# Patient Record
Sex: FEMALE | Race: BLACK OR AFRICAN AMERICAN | Employment: PART TIME | URBAN - METROPOLITAN AREA
[De-identification: names, ages, dates, MRNs, and addresses within clinical notes are randomized per-mention and may not be internally consistent; named-entity substitution may affect disease eponyms.]

---

## 2018-05-09 VITALS
OXYGEN SATURATION: 97 % | HEIGHT: 65 IN | RESPIRATION RATE: 17 BRPM | HEART RATE: 91 BPM | WEIGHT: 155 LBS | SYSTOLIC BLOOD PRESSURE: 105 MMHG | TEMPERATURE: 97.9 F | DIASTOLIC BLOOD PRESSURE: 69 MMHG | BODY MASS INDEX: 25.83 KG/M2

## 2018-05-09 LAB
BACTERIA: ABNORMAL /HPF
BILIRUBIN URINE: NEGATIVE
BLOOD, URINE: NEGATIVE
CHP ED QC CHECK: YES
CLARITY: CLEAR
COLOR: YELLOW
EPITHELIAL CELLS, UA: ABNORMAL /HPF
GLUCOSE URINE: NEGATIVE MG/DL
KETONES, URINE: NEGATIVE MG/DL
LEUKOCYTE ESTERASE, URINE: NEGATIVE
NITRITE, URINE: POSITIVE
PH UA: 6.5 (ref 5–9)
PREGNANCY TEST URINE, POC: POSITIVE
PROTEIN UA: NEGATIVE MG/DL
RBC UA: ABNORMAL /HPF (ref 0–2)
SPECIFIC GRAVITY UA: 1.02 (ref 1–1.03)
UROBILINOGEN, URINE: 1 E.U./DL
WBC UA: ABNORMAL /HPF (ref 0–5)

## 2018-05-09 PROCEDURE — 81001 URINALYSIS AUTO W/SCOPE: CPT

## 2018-05-09 ASSESSMENT — PAIN DESCRIPTION - LOCATION: LOCATION: ABDOMEN

## 2018-05-09 ASSESSMENT — PAIN DESCRIPTION - DESCRIPTORS: DESCRIPTORS: STABBING

## 2018-05-09 ASSESSMENT — PAIN DESCRIPTION - PAIN TYPE: TYPE: ACUTE PAIN

## 2018-05-09 ASSESSMENT — PAIN SCALES - GENERAL: PAINLEVEL_OUTOF10: 8

## 2018-05-10 ENCOUNTER — APPOINTMENT (OUTPATIENT)
Dept: ULTRASOUND IMAGING | Age: 23
End: 2018-05-10
Payer: OTHER GOVERNMENT

## 2018-05-10 ENCOUNTER — HOSPITAL ENCOUNTER (EMERGENCY)
Age: 23
Discharge: HOME | End: 2018-05-11
Payer: OTHER GOVERNMENT

## 2018-05-10 ENCOUNTER — HOSPITAL ENCOUNTER (EMERGENCY)
Age: 23
Discharge: LEFT BEFORE DISCHARGE | End: 2018-05-10
Payer: OTHER GOVERNMENT

## 2018-05-10 VITALS
OXYGEN SATURATION: 100 % | HEART RATE: 109 BPM | RESPIRATION RATE: 14 BRPM | WEIGHT: 150 LBS | TEMPERATURE: 98.3 F | HEIGHT: 65 IN | SYSTOLIC BLOOD PRESSURE: 113 MMHG | DIASTOLIC BLOOD PRESSURE: 57 MMHG | BODY MASS INDEX: 24.99 KG/M2

## 2018-05-10 DIAGNOSIS — N30.00 ACUTE CYSTITIS WITHOUT HEMATURIA: Primary | ICD-10-CM

## 2018-05-10 DIAGNOSIS — Z33.1 PREGNANCY AS INCIDENTAL FINDING: ICD-10-CM

## 2018-05-10 LAB
ALBUMIN SERPL-MCNC: 4.4 G/DL (ref 3.5–5.2)
ALP BLD-CCNC: 104 U/L (ref 35–104)
ALT SERPL-CCNC: 16 U/L (ref 0–32)
ANION GAP SERPL CALCULATED.3IONS-SCNC: 11 MMOL/L (ref 7–16)
AST SERPL-CCNC: 16 U/L (ref 0–31)
BACTERIA: ABNORMAL /HPF
BASOPHILS ABSOLUTE: 0.05 E9/L (ref 0–0.2)
BASOPHILS RELATIVE PERCENT: 0.6 % (ref 0–2)
BILIRUB SERPL-MCNC: <0.2 MG/DL (ref 0–1.2)
BILIRUBIN DIRECT: <0.2 MG/DL (ref 0–0.3)
BILIRUBIN URINE: NEGATIVE
BILIRUBIN, INDIRECT: NORMAL MG/DL (ref 0–1)
BLOOD, URINE: NEGATIVE
BUN BLDV-MCNC: 16 MG/DL (ref 6–20)
CALCIUM SERPL-MCNC: 9.5 MG/DL (ref 8.6–10.2)
CHLORIDE BLD-SCNC: 102 MMOL/L (ref 98–107)
CHP ED QC CHECK: YES
CLARITY: ABNORMAL
CO2: 25 MMOL/L (ref 22–29)
COLOR: YELLOW
CREAT SERPL-MCNC: 0.6 MG/DL (ref 0.5–1)
EOSINOPHILS ABSOLUTE: 0.11 E9/L (ref 0.05–0.5)
EOSINOPHILS RELATIVE PERCENT: 1.3 % (ref 0–6)
EPITHELIAL CELLS, UA: ABNORMAL /HPF
GFR AFRICAN AMERICAN: >60
GFR NON-AFRICAN AMERICAN: >60 ML/MIN/1.73
GLUCOSE BLD-MCNC: 87 MG/DL (ref 74–109)
GLUCOSE URINE: NEGATIVE MG/DL
GONADOTROPIN, CHORIONIC (HCG) QUANT: ABNORMAL MIU/ML
HCT VFR BLD CALC: 33.7 % (ref 34–48)
HEMOGLOBIN: 11 G/DL (ref 11.5–15.5)
IMMATURE GRANULOCYTES #: 0.02 E9/L
IMMATURE GRANULOCYTES %: 0.2 % (ref 0–5)
KETONES, URINE: 15 MG/DL
LACTIC ACID: 0.8 MMOL/L (ref 0.5–2.2)
LEUKOCYTE ESTERASE, URINE: NEGATIVE
LYMPHOCYTES ABSOLUTE: 3.18 E9/L (ref 1.5–4)
LYMPHOCYTES RELATIVE PERCENT: 39 % (ref 20–42)
MCH RBC QN AUTO: 29.8 PG (ref 26–35)
MCHC RBC AUTO-ENTMCNC: 32.6 % (ref 32–34.5)
MCV RBC AUTO: 91.3 FL (ref 80–99.9)
MONOCYTES ABSOLUTE: 0.68 E9/L (ref 0.1–0.95)
MONOCYTES RELATIVE PERCENT: 8.3 % (ref 2–12)
NEUTROPHILS ABSOLUTE: 4.11 E9/L (ref 1.8–7.3)
NEUTROPHILS RELATIVE PERCENT: 50.6 % (ref 43–80)
NITRITE, URINE: POSITIVE
PDW BLD-RTO: 13.4 FL (ref 11.5–15)
PH UA: 6 (ref 5–9)
PLATELET # BLD: 235 E9/L (ref 130–450)
PMV BLD AUTO: 11.2 FL (ref 7–12)
POTASSIUM SERPL-SCNC: 3.7 MMOL/L (ref 3.5–5)
PREGNANCY TEST URINE, POC: POSITIVE
PROTEIN UA: NEGATIVE MG/DL
RBC # BLD: 3.69 E12/L (ref 3.5–5.5)
RBC UA: ABNORMAL /HPF (ref 0–2)
SODIUM BLD-SCNC: 138 MMOL/L (ref 132–146)
SPECIFIC GRAVITY UA: >=1.03 (ref 1–1.03)
TOTAL PROTEIN: 7.5 G/DL (ref 6.4–8.3)
UROBILINOGEN, URINE: 0.2 E.U./DL
WBC # BLD: 8.2 E9/L (ref 4.5–11.5)
WBC UA: ABNORMAL /HPF (ref 0–5)

## 2018-05-10 PROCEDURE — 85025 COMPLETE CBC W/AUTO DIFF WBC: CPT

## 2018-05-10 PROCEDURE — 99284 EMERGENCY DEPT VISIT MOD MDM: CPT

## 2018-05-10 PROCEDURE — 76801 OB US < 14 WKS SINGLE FETUS: CPT

## 2018-05-10 PROCEDURE — 4500000002 HC ER NO CHARGE

## 2018-05-10 PROCEDURE — 2580000003 HC RX 258: Performed by: PHYSICIAN ASSISTANT

## 2018-05-10 PROCEDURE — 80076 HEPATIC FUNCTION PANEL: CPT

## 2018-05-10 PROCEDURE — 80048 BASIC METABOLIC PNL TOTAL CA: CPT

## 2018-05-10 PROCEDURE — 81001 URINALYSIS AUTO W/SCOPE: CPT

## 2018-05-10 PROCEDURE — 83605 ASSAY OF LACTIC ACID: CPT

## 2018-05-10 PROCEDURE — 36415 COLL VENOUS BLD VENIPUNCTURE: CPT

## 2018-05-10 PROCEDURE — 84702 CHORIONIC GONADOTROPIN TEST: CPT

## 2018-05-10 RX ORDER — 0.9 % SODIUM CHLORIDE 0.9 %
1000 INTRAVENOUS SOLUTION INTRAVENOUS ONCE
Status: COMPLETED | OUTPATIENT
Start: 2018-05-10 | End: 2018-05-10

## 2018-05-10 RX ADMIN — SODIUM CHLORIDE 1000 ML: 9 INJECTION, SOLUTION INTRAVENOUS at 21:15

## 2018-05-10 ASSESSMENT — PAIN DESCRIPTION - DESCRIPTORS: DESCRIPTORS: CRAMPING

## 2018-05-10 ASSESSMENT — PAIN SCALES - GENERAL: PAINLEVEL_OUTOF10: 8

## 2018-05-10 ASSESSMENT — PAIN DESCRIPTION - ORIENTATION: ORIENTATION: LOWER;RIGHT

## 2018-05-10 ASSESSMENT — PAIN DESCRIPTION - LOCATION: LOCATION: ABDOMEN

## 2018-05-10 ASSESSMENT — PAIN DESCRIPTION - PAIN TYPE: TYPE: ACUTE PAIN

## 2018-05-11 LAB
CLUE CELLS: NORMAL
SOURCE WET PREP: NORMAL
TRICHOMONAS PREP: NORMAL
YEAST WET PREP: NORMAL

## 2018-05-11 PROCEDURE — 6370000000 HC RX 637 (ALT 250 FOR IP): Performed by: PHYSICIAN ASSISTANT

## 2018-05-11 PROCEDURE — 87491 CHLMYD TRACH DNA AMP PROBE: CPT

## 2018-05-11 PROCEDURE — 87591 N.GONORRHOEAE DNA AMP PROB: CPT

## 2018-05-11 PROCEDURE — 87210 SMEAR WET MOUNT SALINE/INK: CPT

## 2018-05-11 RX ORDER — CEPHALEXIN 500 MG/1
500 CAPSULE ORAL 2 TIMES DAILY
Qty: 10 CAPSULE | Refills: 0 | Status: SHIPPED | OUTPATIENT
Start: 2018-05-11 | End: 2018-05-16

## 2018-05-11 RX ORDER — FOLIC ACID, .BETA.-CAROTENE, ASCORBIC ACID, CHOLECALCIFEROL, .ALPHA.-TOCOPHEROL ACETATE, DL-, THIAMINE MONONITRATE, RIBOFLAVIN, NIACINAMIDE, PYRIDOXINE HYDROCHLORIDE, CYANOCOBALAMIN, CALCIUM PANTOTHENATE, CALCIUM CARBONATE, FERROUS FUMARATE, AND ZINC OXIDE 1; 1000; 100; 400; 30; 3; 3; 15; 20; 12; 7; 200; 29; 20 MG/1; [IU]/1; MG/1; [IU]/1; [IU]/1; MG/1; MG/1; MG/1; MG/1; UG/1; MG/1; MG/1; MG/1; MG/1
1 TABLET, CHEWABLE ORAL DAILY
Qty: 30 TABLET | Refills: 1 | Status: SHIPPED | OUTPATIENT
Start: 2018-05-11 | End: 2019-08-28 | Stop reason: ALTCHOICE

## 2018-05-11 RX ORDER — CEPHALEXIN 500 MG/1
500 CAPSULE ORAL ONCE
Status: COMPLETED | OUTPATIENT
Start: 2018-05-11 | End: 2018-05-11

## 2018-05-11 RX ADMIN — CEPHALEXIN 500 MG: 500 CAPSULE ORAL at 01:37

## 2018-05-14 LAB
CHLAMYDIA TRACHOMATIS AMPLIFIED DET: NORMAL
N GONORRHOEAE AMPLIFIED DET: NORMAL

## 2018-11-05 ENCOUNTER — HOSPITAL ENCOUNTER (OUTPATIENT)
Age: 23
Setting detail: OBSERVATION
Discharge: HOME | End: 2018-11-06
Attending: OBSTETRICS & GYNECOLOGY | Admitting: OBSTETRICS & GYNECOLOGY
Payer: COMMERCIAL

## 2018-11-06 VITALS
WEIGHT: 155 LBS | DIASTOLIC BLOOD PRESSURE: 53 MMHG | BODY MASS INDEX: 24.91 KG/M2 | HEART RATE: 110 BPM | SYSTOLIC BLOOD PRESSURE: 110 MMHG | RESPIRATION RATE: 18 BRPM | HEIGHT: 66 IN | TEMPERATURE: 98.7 F

## 2018-11-06 PROBLEM — Z3A.30 30 WEEKS GESTATION OF PREGNANCY: Status: ACTIVE | Noted: 2018-11-06

## 2018-11-06 LAB
ABO/RH: NORMAL
ANTIBODY SCREEN: NORMAL
KLEIHAUER BETKE: NORMAL

## 2018-11-06 PROCEDURE — 86900 BLOOD TYPING SEROLOGIC ABO: CPT

## 2018-11-06 PROCEDURE — 76805 OB US >/= 14 WKS SNGL FETUS: CPT | Performed by: OBSTETRICS & GYNECOLOGY

## 2018-11-06 PROCEDURE — G0378 HOSPITAL OBSERVATION PER HR: HCPCS

## 2018-11-06 PROCEDURE — 99242 OFF/OP CONSLTJ NEW/EST SF 20: CPT | Performed by: OBSTETRICS & GYNECOLOGY

## 2018-11-06 PROCEDURE — 6370000000 HC RX 637 (ALT 250 FOR IP)

## 2018-11-06 PROCEDURE — 36415 COLL VENOUS BLD VENIPUNCTURE: CPT

## 2018-11-06 PROCEDURE — 86850 RBC ANTIBODY SCREEN: CPT

## 2018-11-06 PROCEDURE — 85460 HEMOGLOBIN FETAL: CPT

## 2018-11-06 PROCEDURE — 76805 OB US >/= 14 WKS SNGL FETUS: CPT

## 2018-11-06 PROCEDURE — 86901 BLOOD TYPING SEROLOGIC RH(D): CPT

## 2018-11-06 PROCEDURE — 76817 TRANSVAGINAL US OBSTETRIC: CPT | Performed by: OBSTETRICS & GYNECOLOGY

## 2018-11-06 PROCEDURE — 76817 TRANSVAGINAL US OBSTETRIC: CPT

## 2018-11-06 PROCEDURE — 76819 FETAL BIOPHYS PROFIL W/O NST: CPT

## 2018-11-06 PROCEDURE — 76819 FETAL BIOPHYS PROFIL W/O NST: CPT | Performed by: OBSTETRICS & GYNECOLOGY

## 2018-11-06 PROCEDURE — 6370000000 HC RX 637 (ALT 250 FOR IP): Performed by: OBSTETRICS & GYNECOLOGY

## 2018-11-06 RX ORDER — ACETAMINOPHEN 325 MG/1
TABLET ORAL
Status: DISCONTINUED
Start: 2018-11-06 | End: 2018-11-06 | Stop reason: HOSPADM

## 2018-11-06 RX ORDER — ACETAMINOPHEN 325 MG/1
TABLET ORAL
Status: COMPLETED
Start: 2018-11-06 | End: 2018-11-06

## 2018-11-06 RX ORDER — ACETAMINOPHEN 325 MG/1
650 TABLET ORAL EVERY 4 HOURS PRN
Status: DISCONTINUED | OUTPATIENT
Start: 2018-11-06 | End: 2018-11-06 | Stop reason: HOSPADM

## 2018-11-06 RX ADMIN — ACETAMINOPHEN 650 MG: 325 TABLET ORAL at 05:14

## 2018-11-06 RX ADMIN — ACETAMINOPHEN 650 MG: 325 TABLET ORAL at 00:57

## 2018-11-06 ASSESSMENT — PAIN SCALES - GENERAL
PAINLEVEL_OUTOF10: 7
PAINLEVEL_OUTOF10: 9

## 2018-11-06 NOTE — CONSULTS
82529 CPT®   US OB 14 Plus Weeks Single or First Gestation [98116 Custom]  US Fetal Biophysical Profile WO Non Stress Testing [45586 Custom]  US OB Transvaginal [id number] Custom]    **This report has been created using voice recognition software.  It may contain minor errors     which are inherent in voice recognition technology**

## 2018-11-06 NOTE — PROGRESS NOTES
Call placed to Dr. Afshan Putnam regarding (-) KB, relief of some pain with Tylenol. New orders received.

## 2018-12-06 ENCOUNTER — ROUTINE PRENATAL (OUTPATIENT)
Dept: OBGYN CLINIC | Age: 23
End: 2018-12-06
Payer: COMMERCIAL

## 2018-12-06 VITALS
WEIGHT: 162 LBS | DIASTOLIC BLOOD PRESSURE: 73 MMHG | HEART RATE: 112 BPM | BODY MASS INDEX: 26.15 KG/M2 | SYSTOLIC BLOOD PRESSURE: 120 MMHG

## 2018-12-06 DIAGNOSIS — O35.8XX0 UMBILICAL VEIN ABNORMALITY AFFECTING PREGNANCY, SINGLE OR UNSPECIFIED FETUS: Primary | ICD-10-CM

## 2018-12-06 DIAGNOSIS — Z3A.35 35 WEEKS GESTATION OF PREGNANCY: ICD-10-CM

## 2018-12-06 LAB
GLUCOSE URINE, POC: NORMAL
PROTEIN UA: NEGATIVE

## 2018-12-06 PROCEDURE — G8484 FLU IMMUNIZE NO ADMIN: HCPCS | Performed by: OBSTETRICS & GYNECOLOGY

## 2018-12-06 PROCEDURE — 76819 FETAL BIOPHYS PROFIL W/O NST: CPT | Performed by: OBSTETRICS & GYNECOLOGY

## 2018-12-06 PROCEDURE — 99211 OFF/OP EST MAY X REQ PHY/QHP: CPT | Performed by: OBSTETRICS & GYNECOLOGY

## 2018-12-06 PROCEDURE — 81002 URINALYSIS NONAUTO W/O SCOPE: CPT | Performed by: OBSTETRICS & GYNECOLOGY

## 2018-12-06 PROCEDURE — 1036F TOBACCO NON-USER: CPT | Performed by: OBSTETRICS & GYNECOLOGY

## 2018-12-06 PROCEDURE — 99213 OFFICE O/P EST LOW 20 MIN: CPT | Performed by: OBSTETRICS & GYNECOLOGY

## 2018-12-06 PROCEDURE — G8427 DOCREV CUR MEDS BY ELIG CLIN: HCPCS | Performed by: OBSTETRICS & GYNECOLOGY

## 2018-12-06 PROCEDURE — 76816 OB US FOLLOW-UP PER FETUS: CPT | Performed by: OBSTETRICS & GYNECOLOGY

## 2018-12-06 PROCEDURE — G8419 CALC BMI OUT NRM PARAM NOF/U: HCPCS | Performed by: OBSTETRICS & GYNECOLOGY

## 2018-12-06 NOTE — PATIENT INSTRUCTIONS
rolls) in 1 hour are normal. Some doctors suggest that you count in the morning until you get to 10 movements. Then you can quit for that day and start again the next day. · Pick your baby's most active time of day to count. This may be any time from morning to evening. · If you do not feel 10 movements in an hour, your baby may be sleeping. Wait for the next hour and count again. When should you call for help? Call your doctor now or seek immediate medical care if:    · You noticed that your baby has stopped moving or is moving much less than normal.    Watch closely for changes in your health, and be sure to contact your doctor if you have any problems. Where can you learn more? Go to https://CornicepeHuggler.comeb.MindSet Rx. org and sign in to your [employer] account. Enter I385 in the InstyBook box to learn more about \"Counting Your Baby's Kicks: Care Instructions. \"     If you do not have an account, please click on the \"Sign Up Now\" link. Current as of: November 21, 2017  Content Version: 11.8  © 5948-7445 Healthwise, Incorporated. Care instructions adapted under license by [facility] ([facility]). If you have questions about a medical condition or this instruction, always ask your healthcare professional. [address] any warranty or liability for your use of this information.

## 2018-12-31 ENCOUNTER — HOSPITAL ENCOUNTER (INPATIENT)
Age: 23
LOS: 2 days | Discharge: HOME | DRG: 560 | End: 2019-01-02
Attending: OBSTETRICS & GYNECOLOGY | Admitting: OBSTETRICS & GYNECOLOGY
Payer: COMMERCIAL

## 2018-12-31 PROBLEM — Z3A.38 PREGNANCY WITH 38 COMPLETED WEEKS GESTATION: Status: ACTIVE | Noted: 2018-12-31

## 2018-12-31 LAB
ABO/RH: NORMAL
ANTIBODY SCREEN: NORMAL
HCT VFR BLD CALC: 32.2 % (ref 34–48)
HEMOGLOBIN: 10.2 G/DL (ref 11.5–15.5)
MCH RBC QN AUTO: 29 PG (ref 26–35)
MCHC RBC AUTO-ENTMCNC: 31.7 % (ref 32–34.5)
MCV RBC AUTO: 91.5 FL (ref 80–99.9)
PDW BLD-RTO: 14.5 FL (ref 11.5–15)
PLATELET # BLD: 209 E9/L (ref 130–450)
PMV BLD AUTO: 11.4 FL (ref 7–12)
RBC # BLD: 3.52 E12/L (ref 3.5–5.5)
WBC # BLD: 8.5 E9/L (ref 4.5–11.5)

## 2018-12-31 PROCEDURE — 1220000000 HC SEMI PRIVATE OB R&B

## 2018-12-31 PROCEDURE — 0KQM0ZZ REPAIR PERINEUM MUSCLE, OPEN APPROACH: ICD-10-PCS | Performed by: OBSTETRICS & GYNECOLOGY

## 2018-12-31 PROCEDURE — 86901 BLOOD TYPING SEROLOGIC RH(D): CPT

## 2018-12-31 PROCEDURE — 2580000003 HC RX 258: Performed by: OBSTETRICS & GYNECOLOGY

## 2018-12-31 PROCEDURE — 6370000000 HC RX 637 (ALT 250 FOR IP): Performed by: OBSTETRICS & GYNECOLOGY

## 2018-12-31 PROCEDURE — 85027 COMPLETE CBC AUTOMATED: CPT

## 2018-12-31 PROCEDURE — 86900 BLOOD TYPING SEROLOGIC ABO: CPT

## 2018-12-31 PROCEDURE — 36415 COLL VENOUS BLD VENIPUNCTURE: CPT

## 2018-12-31 PROCEDURE — 7200000001 HC VAGINAL DELIVERY

## 2018-12-31 PROCEDURE — 6360000002 HC RX W HCPCS

## 2018-12-31 PROCEDURE — 86850 RBC ANTIBODY SCREEN: CPT

## 2018-12-31 RX ORDER — DOCUSATE SODIUM 100 MG/1
100 CAPSULE, LIQUID FILLED ORAL 2 TIMES DAILY
Status: DISCONTINUED | OUTPATIENT
Start: 2018-12-31 | End: 2019-01-02 | Stop reason: HOSPADM

## 2018-12-31 RX ORDER — ACETAMINOPHEN 650 MG
TABLET, EXTENDED RELEASE ORAL
Status: DISPENSED
Start: 2018-12-31 | End: 2018-12-31

## 2018-12-31 RX ORDER — IBUPROFEN 800 MG/1
800 TABLET ORAL EVERY 8 HOURS
Status: DISCONTINUED | OUTPATIENT
Start: 2018-12-31 | End: 2019-01-02 | Stop reason: HOSPADM

## 2018-12-31 RX ORDER — NALOXONE HYDROCHLORIDE 0.4 MG/ML
0.4 INJECTION, SOLUTION INTRAMUSCULAR; INTRAVENOUS; SUBCUTANEOUS PRN
Status: DISCONTINUED | OUTPATIENT
Start: 2018-12-31 | End: 2018-12-31 | Stop reason: HOSPADM

## 2018-12-31 RX ORDER — ACETAMINOPHEN 650 MG
TABLET, EXTENDED RELEASE ORAL PRN
Status: DISCONTINUED | OUTPATIENT
Start: 2018-12-31 | End: 2019-01-02 | Stop reason: HOSPADM

## 2018-12-31 RX ORDER — NALBUPHINE HCL 10 MG/ML
5 AMPUL (ML) INJECTION EVERY 4 HOURS PRN
Status: DISCONTINUED | OUTPATIENT
Start: 2018-12-31 | End: 2018-12-31 | Stop reason: HOSPADM

## 2018-12-31 RX ORDER — SODIUM CHLORIDE 0.9 % (FLUSH) 0.9 %
10 SYRINGE (ML) INJECTION EVERY 12 HOURS SCHEDULED
Status: DISCONTINUED | OUTPATIENT
Start: 2018-12-31 | End: 2019-01-02 | Stop reason: HOSPADM

## 2018-12-31 RX ORDER — ACETAMINOPHEN 325 MG/1
650 TABLET ORAL EVERY 4 HOURS PRN
Status: DISCONTINUED | OUTPATIENT
Start: 2018-12-31 | End: 2019-01-02 | Stop reason: HOSPADM

## 2018-12-31 RX ORDER — ONDANSETRON 2 MG/ML
4 INJECTION INTRAMUSCULAR; INTRAVENOUS EVERY 6 HOURS PRN
Status: DISCONTINUED | OUTPATIENT
Start: 2018-12-31 | End: 2018-12-31 | Stop reason: HOSPADM

## 2018-12-31 RX ORDER — FLUCONAZOLE 100 MG/1
200 TABLET ORAL DAILY
Status: DISCONTINUED | OUTPATIENT
Start: 2018-12-31 | End: 2019-01-02 | Stop reason: HOSPADM

## 2018-12-31 RX ORDER — SODIUM CHLORIDE, SODIUM LACTATE, POTASSIUM CHLORIDE, CALCIUM CHLORIDE 600; 310; 30; 20 MG/100ML; MG/100ML; MG/100ML; MG/100ML
INJECTION, SOLUTION INTRAVENOUS CONTINUOUS
Status: DISCONTINUED | OUTPATIENT
Start: 2018-12-31 | End: 2019-01-02 | Stop reason: HOSPADM

## 2018-12-31 RX ORDER — SODIUM CHLORIDE 0.9 % (FLUSH) 0.9 %
10 SYRINGE (ML) INJECTION PRN
Status: DISCONTINUED | OUTPATIENT
Start: 2018-12-31 | End: 2019-01-02 | Stop reason: HOSPADM

## 2018-12-31 RX ORDER — LIDOCAINE HYDROCHLORIDE 10 MG/ML
INJECTION, SOLUTION INFILTRATION; PERINEURAL
Status: DISPENSED
Start: 2018-12-31 | End: 2018-12-31

## 2018-12-31 RX ORDER — LANOLIN 100 %
OINTMENT (GRAM) TOPICAL PRN
Status: DISCONTINUED | OUTPATIENT
Start: 2018-12-31 | End: 2019-01-02 | Stop reason: HOSPADM

## 2018-12-31 RX ADMIN — IBUPROFEN 800 MG: 800 TABLET, FILM COATED ORAL at 08:56

## 2018-12-31 RX ADMIN — BUTORPHANOL TARTRATE 2 MG: 2 INJECTION, SOLUTION INTRAMUSCULAR; INTRAVENOUS at 06:45

## 2018-12-31 RX ADMIN — Medication 999 MILLI-UNITS/MIN: at 06:40

## 2018-12-31 RX ADMIN — SODIUM CHLORIDE, POTASSIUM CHLORIDE, SODIUM LACTATE AND CALCIUM CHLORIDE: 600; 310; 30; 20 INJECTION, SOLUTION INTRAVENOUS at 05:25

## 2018-12-31 RX ADMIN — Medication 2 MG: at 06:45

## 2018-12-31 RX ADMIN — DOCUSATE SODIUM 100 MG: 100 CAPSULE, LIQUID FILLED ORAL at 21:30

## 2018-12-31 RX ADMIN — Medication 10 ML: at 21:40

## 2018-12-31 RX ADMIN — IBUPROFEN 800 MG: 800 TABLET, FILM COATED ORAL at 16:36

## 2018-12-31 RX ADMIN — IBUPROFEN 800 MG: 800 TABLET, FILM COATED ORAL at 23:42

## 2018-12-31 RX ADMIN — Medication: at 16:36

## 2018-12-31 RX ADMIN — BENZOCAINE AND LEVOMENTHOL: 200; 5 SPRAY TOPICAL at 16:36

## 2018-12-31 ASSESSMENT — PAIN SCALES - GENERAL
PAINLEVEL_OUTOF10: 2
PAINLEVEL_OUTOF10: 10
PAINLEVEL_OUTOF10: 9
PAINLEVEL_OUTOF10: 5

## 2018-12-31 ASSESSMENT — PAIN DESCRIPTION - RADICULAR PAIN: RADICULAR_PAIN: ABSENT

## 2019-01-01 LAB
HCT VFR BLD CALC: 27.2 % (ref 34–48)
HEMOGLOBIN: 8.7 G/DL (ref 11.5–15.5)
MCH RBC QN AUTO: 29.4 PG (ref 26–35)
MCHC RBC AUTO-ENTMCNC: 32 % (ref 32–34.5)
MCV RBC AUTO: 91.9 FL (ref 80–99.9)
PDW BLD-RTO: 14.4 FL (ref 11.5–15)
PLATELET # BLD: 195 E9/L (ref 130–450)
PMV BLD AUTO: 11.9 FL (ref 7–12)
RBC # BLD: 2.96 E12/L (ref 3.5–5.5)
WBC # BLD: 12 E9/L (ref 4.5–11.5)

## 2019-01-01 PROCEDURE — 36415 COLL VENOUS BLD VENIPUNCTURE: CPT

## 2019-01-01 PROCEDURE — 85027 COMPLETE CBC AUTOMATED: CPT

## 2019-01-01 PROCEDURE — 6370000000 HC RX 637 (ALT 250 FOR IP): Performed by: OBSTETRICS & GYNECOLOGY

## 2019-01-01 PROCEDURE — 1220000000 HC SEMI PRIVATE OB R&B

## 2019-01-01 RX ADMIN — IBUPROFEN 800 MG: 800 TABLET, FILM COATED ORAL at 08:02

## 2019-01-01 RX ADMIN — IBUPROFEN 800 MG: 800 TABLET, FILM COATED ORAL at 17:23

## 2019-01-01 RX ADMIN — ACETAMINOPHEN 650 MG: 325 TABLET ORAL at 14:00

## 2019-01-01 RX ADMIN — DOCUSATE SODIUM 100 MG: 100 CAPSULE, LIQUID FILLED ORAL at 08:02

## 2019-01-01 RX ADMIN — DOCUSATE SODIUM 100 MG: 100 CAPSULE, LIQUID FILLED ORAL at 21:49

## 2019-01-01 RX ADMIN — FLUCONAZOLE 200 MG: 100 TABLET ORAL at 08:02

## 2019-01-01 ASSESSMENT — PAIN SCALES - GENERAL
PAINLEVEL_OUTOF10: 3
PAINLEVEL_OUTOF10: 3
PAINLEVEL_OUTOF10: 1

## 2019-01-02 VITALS
BODY MASS INDEX: 27.16 KG/M2 | WEIGHT: 163 LBS | RESPIRATION RATE: 18 BRPM | HEIGHT: 65 IN | TEMPERATURE: 98.2 F | HEART RATE: 93 BPM | DIASTOLIC BLOOD PRESSURE: 55 MMHG | SYSTOLIC BLOOD PRESSURE: 108 MMHG

## 2019-01-02 PROCEDURE — 6370000000 HC RX 637 (ALT 250 FOR IP): Performed by: OBSTETRICS & GYNECOLOGY

## 2019-01-02 RX ORDER — IBUPROFEN 800 MG/1
800 TABLET ORAL EVERY 8 HOURS
Qty: 120 TABLET | Refills: 1 | Status: SHIPPED | OUTPATIENT
Start: 2019-01-02 | End: 2020-01-24

## 2019-01-02 RX ADMIN — DOCUSATE SODIUM 100 MG: 100 CAPSULE, LIQUID FILLED ORAL at 09:13

## 2019-01-02 RX ADMIN — IBUPROFEN 800 MG: 800 TABLET, FILM COATED ORAL at 09:13

## 2019-01-02 RX ADMIN — FLUCONAZOLE 200 MG: 100 TABLET ORAL at 09:14

## 2019-01-02 ASSESSMENT — PAIN DESCRIPTION - FREQUENCY
FREQUENCY: INTERMITTENT
FREQUENCY: INTERMITTENT

## 2019-01-02 ASSESSMENT — PAIN DESCRIPTION - PROGRESSION
CLINICAL_PROGRESSION: NOT CHANGED
CLINICAL_PROGRESSION: GRADUALLY IMPROVING

## 2019-01-02 ASSESSMENT — PAIN DESCRIPTION - LOCATION
LOCATION: PERINEUM
LOCATION: PERINEUM

## 2019-01-02 ASSESSMENT — PAIN SCALES - GENERAL
PAINLEVEL_OUTOF10: 5
PAINLEVEL_OUTOF10: 2

## 2019-01-02 ASSESSMENT — PAIN DESCRIPTION - ORIENTATION
ORIENTATION: LOWER
ORIENTATION: LOWER

## 2019-01-02 ASSESSMENT — PAIN DESCRIPTION - PAIN TYPE
TYPE: ACUTE PAIN
TYPE: ACUTE PAIN

## 2019-01-02 ASSESSMENT — PAIN DESCRIPTION - DESCRIPTORS
DESCRIPTORS: BURNING
DESCRIPTORS: BURNING

## 2019-08-28 ENCOUNTER — HOSPITAL ENCOUNTER (EMERGENCY)
Age: 24
Discharge: HOME | End: 2019-08-28
Payer: COMMERCIAL

## 2019-08-28 VITALS
WEIGHT: 140 LBS | BODY MASS INDEX: 22.5 KG/M2 | HEART RATE: 86 BPM | RESPIRATION RATE: 14 BRPM | TEMPERATURE: 98 F | HEIGHT: 66 IN | OXYGEN SATURATION: 99 % | DIASTOLIC BLOOD PRESSURE: 74 MMHG | SYSTOLIC BLOOD PRESSURE: 109 MMHG

## 2019-08-28 DIAGNOSIS — Z3A.01 LESS THAN 8 WEEKS GESTATION OF PREGNANCY: ICD-10-CM

## 2019-08-28 DIAGNOSIS — H66.92 LEFT OTITIS MEDIA, UNSPECIFIED OTITIS MEDIA TYPE: Primary | ICD-10-CM

## 2019-08-28 LAB
HCG, URINE, POC: POSITIVE
Lab: NORMAL
NEGATIVE QC PASS/FAIL: NORMAL
POSITIVE QC PASS/FAIL: NORMAL

## 2019-08-28 PROCEDURE — 99282 EMERGENCY DEPT VISIT SF MDM: CPT

## 2019-08-28 RX ORDER — AMOXICILLIN 500 MG/1
500 CAPSULE ORAL 2 TIMES DAILY
Qty: 20 CAPSULE | Refills: 0 | Status: SHIPPED | OUTPATIENT
Start: 2019-08-28 | End: 2019-09-07

## 2019-08-28 RX ORDER — PRENATAL NO.42/FOLIC ACID 1.4 MG
1 TABLET CHEW,IMMED AND DELAY REL,BIPHASE ORAL DAILY
Qty: 30 TABLET | Refills: 0 | Status: SHIPPED | OUTPATIENT
Start: 2019-08-28 | End: 2019-09-27

## 2019-08-29 NOTE — ED PROVIDER NOTES
Independent    HPI:  8/28/19, Time: 11:28 PM         Mar Schirmer is a 27 y.o. female presenting to the ED for left ear pain. Patient presents to the emergency department with 2-day history of worsening right ear pain. States that it is causing pain into her entire jaw and face. She denies any fevers. She denies any cough or any other upper respiratory symptoms denies any injury or trauma to her ear and there is been no unusual drainage. Denies any difficulty with hearing. Review of Systems:   Pertinent positives and negatives are stated within HPI, all other systems reviewed and are negative.          --------------------------------------------- PAST HISTORY ---------------------------------------------  Past Medical History:  has a past medical history of ADHD (attention deficit hyperactivity disorder), Allergic, Depression, Emotional disorder, and Traumatic injury during pregnancy, third trimester. Past Surgical History:  has no past surgical history on file. Social History:  reports that she quit smoking about 4 years ago. She has never used smokeless tobacco. She reports that she does not drink alcohol or use drugs. Family History: family history is not on file. The patients home medications have been reviewed. Allergies: Patient has no known allergies.     -------------------------------------------------- RESULTS -------------------------------------------------  All laboratory and radiology results have been personally reviewed by myself   LABS:  Results for orders placed or performed during the hospital encounter of 08/28/19   POC Pregnancy Urine   Result Value Ref Range    HCG, Urine, POC Positive Negative    Lot Number [id number]     Positive QC Pass/Fail Pass     Negative QC Pass/Fail Pass        RADIOLOGY:  Interpreted by Radiologist.  No orders to display       ------------------------- NURSING NOTES AND VITALS REVIEWED ---------------------------   The nursing notes within the

## 2019-12-27 ENCOUNTER — TELEPHONE (OUTPATIENT)
Dept: ADMINISTRATIVE | Age: 24
End: 2019-12-27

## 2020-01-24 ENCOUNTER — ROUTINE PRENATAL (OUTPATIENT)
Dept: OBGYN CLINIC | Age: 25
End: 2020-01-24
Payer: COMMERCIAL

## 2020-01-24 VITALS
BODY MASS INDEX: 27.64 KG/M2 | HEIGHT: 63 IN | SYSTOLIC BLOOD PRESSURE: 117 MMHG | HEART RATE: 85 BPM | DIASTOLIC BLOOD PRESSURE: 69 MMHG | WEIGHT: 156 LBS

## 2020-01-24 LAB
GLUCOSE URINE, POC: NORMAL
PROTEIN UA: NEGATIVE

## 2020-01-24 PROCEDURE — 81002 URINALYSIS NONAUTO W/O SCOPE: CPT | Performed by: OBSTETRICS & GYNECOLOGY

## 2020-01-24 PROCEDURE — G8427 DOCREV CUR MEDS BY ELIG CLIN: HCPCS | Performed by: OBSTETRICS & GYNECOLOGY

## 2020-01-24 PROCEDURE — 76811 OB US DETAILED SNGL FETUS: CPT | Performed by: OBSTETRICS & GYNECOLOGY

## 2020-01-24 PROCEDURE — 76819 FETAL BIOPHYS PROFIL W/O NST: CPT | Performed by: OBSTETRICS & GYNECOLOGY

## 2020-01-24 PROCEDURE — G8419 CALC BMI OUT NRM PARAM NOF/U: HCPCS | Performed by: OBSTETRICS & GYNECOLOGY

## 2020-01-24 PROCEDURE — G8484 FLU IMMUNIZE NO ADMIN: HCPCS | Performed by: OBSTETRICS & GYNECOLOGY

## 2020-01-24 PROCEDURE — 99241 PR OFFICE CONSULTATION NEW/ESTAB PATIENT 15 MIN: CPT | Performed by: OBSTETRICS & GYNECOLOGY

## 2020-01-24 PROCEDURE — 99201 HC NEW PT, E/M LEVEL 1: CPT | Performed by: OBSTETRICS & GYNECOLOGY

## 2020-01-24 RX ORDER — PRENATAL WITH FERROUS FUM AND FOLIC ACID 3080; 920; 120; 400; 22; 1.84; 3; 20; 10; 1; 12; 200; 27; 25; 2 [IU]/1; [IU]/1; MG/1; [IU]/1; MG/1; MG/1; MG/1; MG/1; MG/1; MG/1; UG/1; MG/1; MG/1; MG/1; MG/1
TABLET ORAL
COMMUNITY
Start: 2020-01-14

## 2020-01-24 NOTE — PATIENT INSTRUCTIONS
there is no way to know exactly what childbirth will be like for you. This care sheet will help you know what to expect and how to prepare. This may make your childbirth easier. If you haven't already had the Tdap shot during this pregnancy, talk to your doctor about getting it. It will help protect your  against pertussis infection. In the 36th week, most women have a test for group B streptococcus (GBS). GBS is a common bacteria that can live in the vagina and rectum. It can make your baby sick after birth. If you test positive, you will get antibiotics during labor. The medicine will keep your baby from getting the bacteria. Follow-up care is a key part of your treatment and safety. Be sure to make and go to all appointments, and call your doctor if you are having problems. It's also a good idea to know your test results and keep a list of the medicines you take. How can you care for yourself at home? Learn about pain relief choices  · Pain is different for every woman. Talk with your doctor about your feelings about pain. · You can choose from several types of pain relief. These include medicine or breathing techniques, as well as comfort measures. You can use more than one option. · If you choose to have pain medicine during labor, talk to your doctor about your options. Some medicines lower anxiety and help with some of the pain. Others make your lower body numb so that you won't feel pain. · Be sure to tell your doctor about your pain medicine choice before you start labor or very early in your labor. You may be able to change your mind as labor progresses. · Rarely, a woman is put to sleep by medicine given through a mask or an IV. Labor and delivery  · The first stage of labor has three parts: early, active, and transition. ? Most women have early labor at home. You can stay busy or rest, eat light snacks, drink clear fluids, and start counting contractions. ?  When talking during a smells bad. · You have skin changes, such as:  ? A rash. ? Itching. ? Yellow color to your skin. · You have other concerns about your pregnancy. If you have labor signs at 37 weeks or more  If you have signs of labor at 37 weeks or more, your doctor may tell you to call when your labor becomes more active. Symptoms of active labor include:  · Contractions that are regular. · Contractions that are less than 5 minutes apart. · Contractions that are hard to talk through. Follow-up care is a key part of your treatment and safety. Be sure to make and go to all appointments, and call your doctor if you are having problems. It's also a good idea to know your test results and keep a list of the medicines you take. Where can you learn more? Go to https://Proteus Digital HealthpeMonthlys.SubHub. org and sign in to your Daptiv account. Enter  in the eSeekers box to learn more about \"Learning About When to Call Your Doctor During Pregnancy (After 20 Weeks). \"     If you do not have an account, please click on the \"Sign Up Now\" link. Current as of: May 29, 2019  Content Version: 12.3  © 7517-4814 Vuzit. Care instructions adapted under license by [facility] ([facility]). If you have questions about a medical condition or this instruction, always ask your healthcare professional. [address] any warranty or liability for your use of this information. Patient Education        Counting Your Baby's Kicks: Care Instructions  Your Care Instructions    Counting your baby's kicks is one way your doctor can tell that your baby is healthy. Most women--especially in a first pregnancy--feel their baby move for the first time between 16 and 22 weeks. The movement may feel like flutters rather than kicks. Your baby may move more at certain times of the day. When you are active, you may notice less kicking than when you are resting.  At your prenatal visits, your doctor will ask whether the baby is active. In your last trimester, your doctor may ask you to count the number of times you feel your baby move. Follow-up care is a key part of your treatment and safety. Be sure to make and go to all appointments, and call your doctor if you are having problems. It's also a good idea to know your test results and keep a list of the medicines you take. How do you count fetal kicks? · A common method of checking your baby's movement is to count the number of kicks or moves you feel in 1 hour. Ten movements (such as kicks, flutters, or rolls) in 1 hour are normal. Some doctors suggest that you count in the morning until you get to 10 movements. Then you can quit for that day and start again the next day. · Pick your baby's most active time of day to count. This may be any time from morning to evening. · If you do not feel 10 movements in an hour, your baby may be sleeping. Wait for the next hour and count again. When should you call for help? Call your doctor now or seek immediate medical care if:    · You noticed that your baby has stopped moving or is moving much less than normal.    Watch closely for changes in your health, and be sure to contact your doctor if you have any problems. Where can you learn more? Go to https://GluMetrics.Pay4later. org and sign in to your [employer] account. Enter Y063 in the [facility] box to learn more about \"Counting Your Baby's Kicks: Care Instructions. \"     If you do not have an account, please click on the \"Sign Up Now\" link. Current as of: May 29, 2019  Content Version: 12.3  © 4393-8451 Healthwise, Incorporated. Care instructions adapted under license by [facility] ([facility]). If you have questions about a medical condition or this instruction, always ask your healthcare professional. [address] any warranty or liability for your use of this information.

## 2020-02-10 ENCOUNTER — HOSPITAL ENCOUNTER (INPATIENT)
Age: 25
LOS: 2 days | Discharge: HOME | DRG: 560 | End: 2020-02-12
Attending: OBSTETRICS & GYNECOLOGY | Admitting: OBSTETRICS & GYNECOLOGY
Payer: COMMERCIAL

## 2020-02-10 LAB
ABO/RH: NORMAL
ANTIBODY SCREEN: NORMAL
HCT VFR BLD CALC: 34.6 % (ref 34–48)
HEMOGLOBIN: 11.1 G/DL (ref 11.5–15.5)
MCH RBC QN AUTO: 29.5 PG (ref 26–35)
MCHC RBC AUTO-ENTMCNC: 32.1 % (ref 32–34.5)
MCV RBC AUTO: 92 FL (ref 80–99.9)
PDW BLD-RTO: 14.6 FL (ref 11.5–15)
PLATELET # BLD: 217 E9/L (ref 130–450)
PMV BLD AUTO: 12.2 FL (ref 7–12)
RBC # BLD: 3.76 E12/L (ref 3.5–5.5)
WBC # BLD: 10.3 E9/L (ref 4.5–11.5)

## 2020-02-10 PROCEDURE — 7200000001 HC VAGINAL DELIVERY

## 2020-02-10 PROCEDURE — 86850 RBC ANTIBODY SCREEN: CPT

## 2020-02-10 PROCEDURE — 6370000000 HC RX 637 (ALT 250 FOR IP): Performed by: OBSTETRICS & GYNECOLOGY

## 2020-02-10 PROCEDURE — 1220000000 HC SEMI PRIVATE OB R&B

## 2020-02-10 PROCEDURE — 2580000003 HC RX 258: Performed by: OBSTETRICS & GYNECOLOGY

## 2020-02-10 PROCEDURE — 85027 COMPLETE CBC AUTOMATED: CPT

## 2020-02-10 PROCEDURE — 6360000002 HC RX W HCPCS: Performed by: OBSTETRICS & GYNECOLOGY

## 2020-02-10 PROCEDURE — 36415 COLL VENOUS BLD VENIPUNCTURE: CPT

## 2020-02-10 PROCEDURE — 86900 BLOOD TYPING SEROLOGIC ABO: CPT

## 2020-02-10 PROCEDURE — 6360000002 HC RX W HCPCS

## 2020-02-10 PROCEDURE — 86901 BLOOD TYPING SEROLOGIC RH(D): CPT

## 2020-02-10 PROCEDURE — 88307 TISSUE EXAM BY PATHOLOGIST: CPT

## 2020-02-10 RX ORDER — KETOROLAC TROMETHAMINE 30 MG/ML
INJECTION, SOLUTION INTRAMUSCULAR; INTRAVENOUS
Status: COMPLETED
Start: 2020-02-10 | End: 2020-02-10

## 2020-02-10 RX ORDER — ONDANSETRON 2 MG/ML
4 INJECTION INTRAMUSCULAR; INTRAVENOUS EVERY 6 HOURS PRN
Status: DISCONTINUED | OUTPATIENT
Start: 2020-02-10 | End: 2020-02-12 | Stop reason: HOSPADM

## 2020-02-10 RX ORDER — FERROUS SULFATE 325(65) MG
325 TABLET ORAL 2 TIMES DAILY WITH MEALS
Status: DISCONTINUED | OUTPATIENT
Start: 2020-02-10 | End: 2020-02-12 | Stop reason: HOSPADM

## 2020-02-10 RX ORDER — KETOROLAC TROMETHAMINE 30 MG/ML
30 INJECTION, SOLUTION INTRAMUSCULAR; INTRAVENOUS ONCE
Status: COMPLETED | OUTPATIENT
Start: 2020-02-10 | End: 2020-02-10

## 2020-02-10 RX ORDER — KETOROLAC TROMETHAMINE 30 MG/ML
30 INJECTION, SOLUTION INTRAMUSCULAR; INTRAVENOUS EVERY 6 HOURS
Status: DISCONTINUED | OUTPATIENT
Start: 2020-02-10 | End: 2020-02-10

## 2020-02-10 RX ORDER — IBUPROFEN 800 MG/1
800 TABLET ORAL EVERY 8 HOURS
Status: DISCONTINUED | OUTPATIENT
Start: 2020-02-11 | End: 2020-02-10

## 2020-02-10 RX ORDER — LANOLIN 100 %
OINTMENT (GRAM) TOPICAL PRN
Status: DISCONTINUED | OUTPATIENT
Start: 2020-02-10 | End: 2020-02-12 | Stop reason: HOSPADM

## 2020-02-10 RX ORDER — SODIUM CHLORIDE, SODIUM LACTATE, POTASSIUM CHLORIDE, CALCIUM CHLORIDE 600; 310; 30; 20 MG/100ML; MG/100ML; MG/100ML; MG/100ML
INJECTION, SOLUTION INTRAVENOUS CONTINUOUS
Status: DISCONTINUED | OUTPATIENT
Start: 2020-02-10 | End: 2020-02-12 | Stop reason: HOSPADM

## 2020-02-10 RX ORDER — CARBOPROST TROMETHAMINE 250 UG/ML
250 INJECTION, SOLUTION INTRAMUSCULAR
Status: ACTIVE | OUTPATIENT
Start: 2020-02-10 | End: 2020-02-10

## 2020-02-10 RX ORDER — DOCUSATE SODIUM 100 MG/1
100 CAPSULE, LIQUID FILLED ORAL 2 TIMES DAILY
Status: DISCONTINUED | OUTPATIENT
Start: 2020-02-10 | End: 2020-02-12 | Stop reason: HOSPADM

## 2020-02-10 RX ORDER — ONDANSETRON 4 MG/1
8 TABLET, FILM COATED ORAL EVERY 8 HOURS PRN
Status: DISCONTINUED | OUTPATIENT
Start: 2020-02-10 | End: 2020-02-12 | Stop reason: HOSPADM

## 2020-02-10 RX ORDER — ACETAMINOPHEN 325 MG/1
650 TABLET ORAL EVERY 4 HOURS PRN
Status: DISCONTINUED | OUTPATIENT
Start: 2020-02-10 | End: 2020-02-12 | Stop reason: HOSPADM

## 2020-02-10 RX ORDER — SODIUM CHLORIDE 0.9 % (FLUSH) 0.9 %
10 SYRINGE (ML) INJECTION EVERY 12 HOURS SCHEDULED
Status: DISCONTINUED | OUTPATIENT
Start: 2020-02-10 | End: 2020-02-12 | Stop reason: HOSPADM

## 2020-02-10 RX ORDER — IBUPROFEN 800 MG/1
800 TABLET ORAL EVERY 8 HOURS
Status: DISCONTINUED | OUTPATIENT
Start: 2020-02-10 | End: 2020-02-12 | Stop reason: HOSPADM

## 2020-02-10 RX ORDER — METHYLERGONOVINE MALEATE 0.2 MG/ML
200 INJECTION INTRAVENOUS PRN
Status: DISCONTINUED | OUTPATIENT
Start: 2020-02-10 | End: 2020-02-12 | Stop reason: HOSPADM

## 2020-02-10 RX ADMIN — DOCUSATE SODIUM 100 MG: 100 CAPSULE, LIQUID FILLED ORAL at 07:50

## 2020-02-10 RX ADMIN — KETOROLAC TROMETHAMINE 30 MG: 30 INJECTION, SOLUTION INTRAMUSCULAR at 04:38

## 2020-02-10 RX ADMIN — IBUPROFEN 800 MG: 800 TABLET, FILM COATED ORAL at 13:27

## 2020-02-10 RX ADMIN — SODIUM CHLORIDE, POTASSIUM CHLORIDE, SODIUM LACTATE AND CALCIUM CHLORIDE: 600; 310; 30; 20 INJECTION, SOLUTION INTRAVENOUS at 02:45

## 2020-02-10 RX ADMIN — KETOROLAC TROMETHAMINE 30 MG: 30 INJECTION, SOLUTION INTRAMUSCULAR; INTRAVENOUS at 04:38

## 2020-02-10 RX ADMIN — Medication 999 MILLI-UNITS/MIN: at 03:55

## 2020-02-10 RX ADMIN — IBUPROFEN 800 MG: 800 TABLET, FILM COATED ORAL at 21:19

## 2020-02-10 RX ADMIN — ACETAMINOPHEN 650 MG: 325 TABLET ORAL at 19:31

## 2020-02-10 RX ADMIN — DOCUSATE SODIUM 100 MG: 100 CAPSULE, LIQUID FILLED ORAL at 13:27

## 2020-02-10 ASSESSMENT — PAIN SCALES - GENERAL
PAINLEVEL_OUTOF10: 3
PAINLEVEL_OUTOF10: 0
PAINLEVEL_OUTOF10: 3
PAINLEVEL_OUTOF10: 10

## 2020-02-10 NOTE — PROGRESS NOTES
Pt refusing to answer anymore questions because she is in too much pain. Pt also states that she wishes that she never had a baby. Charge nurse notified.

## 2020-02-10 NOTE — PROGRESS NOTES
I..V. site needs secured after it appeared to have been pulled. Pt told to keep arm still so I could secure it with some tape. Pt began moving arm up and down in the bed. I.V. site lost.  Dry dressing applied.

## 2020-02-11 LAB
HCT VFR BLD CALC: 30.3 % (ref 34–48)
HEMOGLOBIN: 9.6 G/DL (ref 11.5–15.5)
MCH RBC QN AUTO: 29.8 PG (ref 26–35)
MCHC RBC AUTO-ENTMCNC: 31.7 % (ref 32–34.5)
MCV RBC AUTO: 94.1 FL (ref 80–99.9)
PDW BLD-RTO: 14.6 FL (ref 11.5–15)
PLATELET # BLD: 211 E9/L (ref 130–450)
PMV BLD AUTO: 12.1 FL (ref 7–12)
RBC # BLD: 3.22 E12/L (ref 3.5–5.5)
WBC # BLD: 12.8 E9/L (ref 4.5–11.5)

## 2020-02-11 PROCEDURE — 85027 COMPLETE CBC AUTOMATED: CPT

## 2020-02-11 PROCEDURE — 36415 COLL VENOUS BLD VENIPUNCTURE: CPT

## 2020-02-11 PROCEDURE — 1220000000 HC SEMI PRIVATE OB R&B

## 2020-02-11 PROCEDURE — 6370000000 HC RX 637 (ALT 250 FOR IP): Performed by: OBSTETRICS & GYNECOLOGY

## 2020-02-11 RX ADMIN — ACETAMINOPHEN 650 MG: 325 TABLET ORAL at 17:33

## 2020-02-11 RX ADMIN — ACETAMINOPHEN 650 MG: 325 TABLET ORAL at 08:41

## 2020-02-11 RX ADMIN — IBUPROFEN 800 MG: 800 TABLET, FILM COATED ORAL at 05:53

## 2020-02-11 RX ADMIN — FERROUS SULFATE TAB 325 MG (65 MG ELEMENTAL FE) 325 MG: 325 (65 FE) TAB at 08:41

## 2020-02-11 RX ADMIN — FERROUS SULFATE TAB 325 MG (65 MG ELEMENTAL FE) 325 MG: 325 (65 FE) TAB at 17:33

## 2020-02-11 RX ADMIN — Medication: at 08:41

## 2020-02-11 RX ADMIN — DOCUSATE SODIUM 100 MG: 100 CAPSULE, LIQUID FILLED ORAL at 08:41

## 2020-02-11 ASSESSMENT — PAIN SCALES - GENERAL
PAINLEVEL_OUTOF10: 3
PAINLEVEL_OUTOF10: 5
PAINLEVEL_OUTOF10: 0

## 2020-02-11 NOTE — PROGRESS NOTES
Department of Obstetrics and Gynecology  Labor and Delivery  Attending Post Partum Progress Note      SUBJECTIVE:  Patient without complaints  OBJECTIVE:      Vitals:  BP 85/59  Pulse 83  Temp(Src) 97.4 °F (36.3 °C) (Oral)  Resp 18  Ht 5' 4\" (1.626 m)  Wt 160 lb (72.576 kg)  BMI 27.46 kg/m2  Breastfeeding? Yes    CONSTITUTIONAL:  awake, alert, cooperative, no apparent distress, and appears stated age  ABDOMEN:  Fundus firm and 1 below the umbilicus  CHEST/BREASTS:  Breasts symmetrical,soft and non-tender  MUSCULOSKELETAL: No calf tenderness, 0 pedal edema. DATA:    RH:  No results found for: ANATITER, C3, C4, RF  Antibody Screen:  No components found for: ABSCINT  Urine Culture Screen:  No components found for: MORGAN  CBC:    Lab Results   Component Value Date    WBC 12.8 02/11/2020    RBC 3.22 02/11/2020    HGB 9.6 02/11/2020    HCT 30.3 02/11/2020    MCV 94.1 02/11/2020    RDW 14.6 02/11/2020     02/11/2020     U/A:  No components found for: Tammy Winslow, USPGRAV, UPH, UPROTEIN, UGLUCOSE, UKETONE, UBILI, UBLOOD, UNITRITE, UUROBIL, ULEUKEST, USQEPI, URENEPI, UWBC, URBC, Synchari, Ashok Schwab    ASSESSMENT & PLAN:        Assessment: PP#1 doing well    Plan:  We will continue supportive care    Fady Stevenson  12:24 PM

## 2020-02-11 NOTE — LACTATION NOTE
Pt reports pumping in NICU and already has an EBP for home use. Encouraged to pump often to establish a good milk supply. Discussed proper flange size.

## 2020-02-12 VITALS
HEIGHT: 63 IN | TEMPERATURE: 98.1 F | DIASTOLIC BLOOD PRESSURE: 60 MMHG | HEART RATE: 76 BPM | BODY MASS INDEX: 27.64 KG/M2 | SYSTOLIC BLOOD PRESSURE: 117 MMHG | WEIGHT: 156 LBS | RESPIRATION RATE: 18 BRPM

## 2020-02-12 PROCEDURE — 6370000000 HC RX 637 (ALT 250 FOR IP): Performed by: OBSTETRICS & GYNECOLOGY

## 2020-02-12 RX ORDER — IBUPROFEN 800 MG/1
800 TABLET ORAL EVERY 8 HOURS
Qty: 60 TABLET | Refills: 3 | Status: SHIPPED | OUTPATIENT
Start: 2020-02-12 | End: 2022-04-08

## 2020-02-12 RX ORDER — LANOLIN 100 %
OINTMENT (GRAM) TOPICAL
Qty: 1 TUBE | Refills: 3 | Status: SHIPPED | OUTPATIENT
Start: 2020-02-12 | End: 2022-04-08

## 2020-02-12 RX ADMIN — FERROUS SULFATE TAB 325 MG (65 MG ELEMENTAL FE) 325 MG: 325 (65 FE) TAB at 08:42

## 2020-02-12 RX ADMIN — ACETAMINOPHEN 650 MG: 325 TABLET ORAL at 06:41

## 2020-02-12 RX ADMIN — DOCUSATE SODIUM 100 MG: 100 CAPSULE, LIQUID FILLED ORAL at 08:42

## 2020-02-12 RX ADMIN — DOCUSATE SODIUM 100 MG: 100 CAPSULE, LIQUID FILLED ORAL at 00:57

## 2020-02-12 RX ADMIN — IBUPROFEN 800 MG: 800 TABLET, FILM COATED ORAL at 00:58

## 2020-02-12 ASSESSMENT — PAIN SCALES - GENERAL
PAINLEVEL_OUTOF10: 4
PAINLEVEL_OUTOF10: 1

## 2020-02-12 ASSESSMENT — PAIN DESCRIPTION - PROGRESSION: CLINICAL_PROGRESSION: GRADUALLY WORSENING

## 2020-02-12 NOTE — PROGRESS NOTES
Department of Obstetrics and Gynecology  Labor and Delivery  Attending Post Partum Progress Note      SUBJECTIVE:  Patient without complaints. She is doing well at this time, with her significant other at bedside. She is eating and reports that she has moved about today. OBJECTIVE:      Vitals:  BP 85/59  Pulse 83  Temp(Src) 97.4 °F (36.3 °C) (Oral)  Resp 18  Ht 5' 4\" (1.626 m)  Wt 160 lb (72.576 kg)  BMI 27.46 kg/m2  Breastfeeding? Yes    CONSTITUTIONAL:  awake, alert, cooperative, no apparent distress, and appears stated age  ABDOMEN:  Fundus firm and 1 below the umbilicus  CHEST/BREASTS:  Breasts symmetrical,soft and non-tender  MUSCULOSKELETAL: No calf tenderness, 1+ pedal edema. DATA:    RH:  No results found for: ANATITER, C3, C4, RF  Antibody Screen:  No components found for: ABSCINT  Urine Culture Screen:  No components found for: CURINE  CBC:    Lab Results   Component Value Date    WBC 12.8 02/11/2020    RBC 3.22 02/11/2020    HGB 9.6 02/11/2020    HCT 30.3 02/11/2020    MCV 94.1 02/11/2020    RDW 14.6 02/11/2020     02/11/2020     U/A:  No components found for: Debbie Steele, USPGRAV, UPH, UPROTEIN, UGLUCOSE, UKETONE, UBILI, UBLOOD, UNITRITE, UUROBIL, ULEUKEST, USQEPI, URENEPI, UWBC, URBC, Synchari, [place]    ASSESSMENT & PLAN:        Assessment: PP# 2    Plan: Patient is doing well at this time. She has had good movement about the unit, and reports that she has no current complaints or symptoms. She is said to be discharged later today. All questions were answered. She set to follow-up with Dr. Clint Arroyo in office.     Quincy Cline  2:30 PM

## 2020-03-05 ENCOUNTER — HOSPITAL ENCOUNTER (EMERGENCY)
Age: 25
Discharge: LEFT BEFORE DISCHARGE | End: 2020-03-05
Payer: COMMERCIAL

## 2020-03-05 VITALS
DIASTOLIC BLOOD PRESSURE: 92 MMHG | TEMPERATURE: 99 F | HEART RATE: 90 BPM | SYSTOLIC BLOOD PRESSURE: 112 MMHG | BODY MASS INDEX: 28.32 KG/M2 | HEIGHT: 61 IN | RESPIRATION RATE: 16 BRPM | OXYGEN SATURATION: 97 % | WEIGHT: 150 LBS

## 2020-03-05 LAB
ALBUMIN SERPL-MCNC: 3.7 G/DL (ref 3.5–5.2)
ALP BLD-CCNC: 106 U/L (ref 35–104)
ALT SERPL-CCNC: 12 U/L (ref 0–32)
ANION GAP SERPL CALCULATED.3IONS-SCNC: 11 MMOL/L (ref 7–16)
AST SERPL-CCNC: 14 U/L (ref 0–31)
BILIRUB SERPL-MCNC: <0.2 MG/DL (ref 0–1.2)
BUN BLDV-MCNC: 11 MG/DL (ref 6–20)
CALCIUM SERPL-MCNC: 8.6 MG/DL (ref 8.6–10.2)
CHLORIDE BLD-SCNC: 102 MMOL/L (ref 98–107)
CO2: 24 MMOL/L (ref 22–29)
CREAT SERPL-MCNC: 0.6 MG/DL (ref 0.5–1)
GFR AFRICAN AMERICAN: >60
GFR NON-AFRICAN AMERICAN: >60 ML/MIN/1.73
GLUCOSE BLD-MCNC: 104 MG/DL (ref 74–99)
POTASSIUM SERPL-SCNC: 3.8 MMOL/L (ref 3.5–5)
SODIUM BLD-SCNC: 137 MMOL/L (ref 132–146)
TOTAL PROTEIN: 7 G/DL (ref 6.4–8.3)

## 2020-03-05 PROCEDURE — 85025 COMPLETE CBC W/AUTO DIFF WBC: CPT

## 2020-03-05 PROCEDURE — 6360000002 HC RX W HCPCS: Performed by: NURSE PRACTITIONER

## 2020-03-05 PROCEDURE — 6370000000 HC RX 637 (ALT 250 FOR IP): Performed by: NURSE PRACTITIONER

## 2020-03-05 PROCEDURE — 96374 THER/PROPH/DIAG INJ IV PUSH: CPT

## 2020-03-05 PROCEDURE — 80053 COMPREHEN METABOLIC PANEL: CPT

## 2020-03-05 PROCEDURE — 99284 EMERGENCY DEPT VISIT MOD MDM: CPT

## 2020-03-05 PROCEDURE — 2580000003 HC RX 258: Performed by: NURSE PRACTITIONER

## 2020-03-05 PROCEDURE — 96375 TX/PRO/DX INJ NEW DRUG ADDON: CPT

## 2020-03-05 RX ORDER — ONDANSETRON 4 MG/1
4 TABLET, ORALLY DISINTEGRATING ORAL ONCE
Status: COMPLETED | OUTPATIENT
Start: 2020-03-05 | End: 2020-03-05

## 2020-03-05 RX ORDER — 0.9 % SODIUM CHLORIDE 0.9 %
1000 INTRAVENOUS SOLUTION INTRAVENOUS ONCE
Status: COMPLETED | OUTPATIENT
Start: 2020-03-05 | End: 2020-03-05

## 2020-03-05 RX ORDER — METOCLOPRAMIDE HYDROCHLORIDE 5 MG/ML
5 INJECTION INTRAMUSCULAR; INTRAVENOUS ONCE
Status: COMPLETED | OUTPATIENT
Start: 2020-03-05 | End: 2020-03-05

## 2020-03-05 RX ORDER — DEXAMETHASONE SODIUM PHOSPHATE 10 MG/ML
10 INJECTION INTRAMUSCULAR; INTRAVENOUS ONCE
Status: COMPLETED | OUTPATIENT
Start: 2020-03-05 | End: 2020-03-05

## 2020-03-05 RX ORDER — DIPHENHYDRAMINE HYDROCHLORIDE 50 MG/ML
25 INJECTION INTRAMUSCULAR; INTRAVENOUS ONCE
Status: COMPLETED | OUTPATIENT
Start: 2020-03-05 | End: 2020-03-05

## 2020-03-05 RX ADMIN — DEXAMETHASONE SODIUM PHOSPHATE 10 MG: 10 INJECTION INTRAMUSCULAR; INTRAVENOUS at 22:35

## 2020-03-05 RX ADMIN — METOCLOPRAMIDE 5 MG: 5 INJECTION, SOLUTION INTRAMUSCULAR; INTRAVENOUS at 22:35

## 2020-03-05 RX ADMIN — ONDANSETRON 4 MG: 4 TABLET, ORALLY DISINTEGRATING ORAL at 22:34

## 2020-03-05 RX ADMIN — SODIUM CHLORIDE 1000 ML: 9 INJECTION, SOLUTION INTRAVENOUS at 22:33

## 2020-03-05 RX ADMIN — DIPHENHYDRAMINE HYDROCHLORIDE 25 MG: 50 INJECTION, SOLUTION INTRAMUSCULAR; INTRAVENOUS at 22:34

## 2020-03-05 ASSESSMENT — PAIN SCALES - GENERAL: PAINLEVEL_OUTOF10: 10

## 2020-03-05 ASSESSMENT — PAIN DESCRIPTION - DESCRIPTORS: DESCRIPTORS: SHARP

## 2020-03-05 ASSESSMENT — PAIN DESCRIPTION - LOCATION: LOCATION: HEAD

## 2020-03-06 LAB
ANISOCYTOSIS: ABNORMAL
BASOPHILS ABSOLUTE: 0.09 E9/L (ref 0–0.2)
BASOPHILS RELATIVE PERCENT: 0.9 % (ref 0–2)
BURR CELLS: ABNORMAL
EOSINOPHILS ABSOLUTE: 0.25 E9/L (ref 0.05–0.5)
EOSINOPHILS RELATIVE PERCENT: 2.6 % (ref 0–6)
HCT VFR BLD CALC: 33.9 % (ref 34–48)
HEMOGLOBIN: 10.7 G/DL (ref 11.5–15.5)
LYMPHOCYTES ABSOLUTE: 2.94 E9/L (ref 1.5–4)
LYMPHOCYTES RELATIVE PERCENT: 30.4 % (ref 20–42)
MCH RBC QN AUTO: 29.1 PG (ref 26–35)
MCHC RBC AUTO-ENTMCNC: 31.6 % (ref 32–34.5)
MCV RBC AUTO: 92.1 FL (ref 80–99.9)
MONOCYTES ABSOLUTE: 0.69 E9/L (ref 0.1–0.95)
MONOCYTES RELATIVE PERCENT: 7 % (ref 2–12)
NEUTROPHILS ABSOLUTE: 5.78 E9/L (ref 1.8–7.3)
NEUTROPHILS RELATIVE PERCENT: 59.1 % (ref 43–80)
PDW BLD-RTO: 13.2 FL (ref 11.5–15)
PLATELET # BLD: 212 E9/L (ref 130–450)
PMV BLD AUTO: 12.6 FL (ref 7–12)
POIKILOCYTES: ABNORMAL
POLYCHROMASIA: ABNORMAL
RBC # BLD: 3.68 E12/L (ref 3.5–5.5)
WBC # BLD: 9.8 E9/L (ref 4.5–11.5)

## 2020-03-06 NOTE — ED NOTES
Patient eloped, RN searched department for patient, patient not found. IV  Removed and sitting on the bed. Provider notified of patient elopement.        Joana Wood RN  03/05/20 [id number]

## 2020-03-06 NOTE — ED NOTES
Radiology Procedure Waiver   Name: Koki Lechuga  : 1995  MRN: [id number]    Date:  3/5/20    Time: 9:29 PM    Benefits of immediately proceeding with Radiology exam(s) without pre-testing outweigh the risks or are not indicated as specified below and therefore the following is/are being waived:    [x] Pregnancy test   [x] Patients LMP on-time and regular.   [] Patient had Tubal Ligation or has other Contraception Device. [] Patient  is Menopausal or Premenarcheal.    [] Patient had Full or Partial Hysterectomy. [] Protocol for Iodine allergy    [] MRI Questionnaire     [] BUN/Creatinine   [] Patient age w/no hx of renal dysfunction. [] Patient on Dialysis. [] Recent Normal Labs.   Electronically signed by BARBY Montiel CNP on 3/5/20 at 9:29 PM             BARBY Montiel CNP  20 [phone]

## 2020-03-06 NOTE — ED PROVIDER NOTES
[facility]   Department of Emergency Medicine   ED  Provider Note  Admit Date/RoomTime: 3/5/2020  9:30 PM  ED Room: [facility]   Chief Complaint:   Headache (x1 hour. nausea and vomiting. states she just delievered vaginal baby x2 weeks ago )    History of Present Illness   Source of history provided by:  patient. History/Exam Limitations: none. Mehreen Germain is a 27 y.o. old female who has a past medical hx of:   Past Medical History:   Diagnosis Date    ADHD (attention deficit hyperactivity disorder)     Allergic     seasonal    Depression     Emotional disorder     Traumatic injury during pregnancy, third trimester     presents to the emergency department for complaint of headache for the past 2 days. Reports associated nausea and light sensitivity. Denies trauma/injury. She reports being postpartum and delvered 2 weeks ago. States that she did not have an epidural.  Denies fever, chills, abdominal pain, diplopia, blurred vision, neck pain/stiffness, chest pain, shortness of breath, lightheadedness, dizziness, syncope, or any other complaints at this time. Since onset the symptoms have been fluctuating and moderate in severity. Denies any history of headaches. The symptoms are aggravated by nothing and relieved by nothing. Treatment prior to arrival consisted of: OTC NSAID's with minimal relief of symptoms. She has a history of no anticoagulation use. ROS    Pertinent positives and negatives are stated within HPI, all other systems reviewed and are negative. History reviewed. No pertinent surgical history. Social History:  reports that she has been smoking cigarettes. She has never used smokeless tobacco. She reports that she does not drink alcohol or use drugs. Family History: family history includes Hypertension in her father and mother. Allergies: Patient has no known allergies.     Physical Exam           ED Triage Vitals   BP Temp Temp src Pulse Resp SpO2 Height Weight   03/05/20 her head was ordered. Nursing reported that she pulled out her IV and eloped the department prior to complete work-up. Counseling: The emergency provider has spoken with the patient and discussed todays results, in addition to providing specific details for the plan of care and counseling regarding the diagnosis and prognosis. She eloped the department prior to completion of her work-up. Assessment      1. Nonintractable headache, unspecified chronicity pattern, unspecified headache type      Plan   Other Disposition: Eloped  Patient condition is stable    New Medications     Discharge Medication List as of 3/5/2020 11:01 PM        Electronically signed by BARBY Nath CNP   DD: 3/5/20  **This report was transcribed using voice recognition software. Every effort was made to ensure accuracy; however, inadvertent computerized transcription errors may be present.   END OF ED PROVIDER NOTE      BARBY Nath CNP  03/06/20 0543

## 2021-07-15 VITALS
SYSTOLIC BLOOD PRESSURE: 117 MMHG | OXYGEN SATURATION: 97 % | BODY MASS INDEX: 28.34 KG/M2 | DIASTOLIC BLOOD PRESSURE: 82 MMHG | RESPIRATION RATE: 17 BRPM | HEART RATE: 95 BPM | TEMPERATURE: 98.2 F | WEIGHT: 150 LBS

## 2021-07-15 LAB
ALBUMIN SERPL-MCNC: 4.1 G/DL (ref 3.5–5.2)
ALP BLD-CCNC: 95 U/L (ref 35–104)
ALT SERPL-CCNC: 39 U/L (ref 0–32)
ANION GAP SERPL CALCULATED.3IONS-SCNC: 9 MMOL/L (ref 7–16)
AST SERPL-CCNC: 26 U/L (ref 0–31)
BACTERIA: ABNORMAL /HPF
BASOPHILS ABSOLUTE: 0.02 E9/L (ref 0–0.2)
BASOPHILS RELATIVE PERCENT: 0.4 % (ref 0–2)
BILIRUB SERPL-MCNC: 0.2 MG/DL (ref 0–1.2)
BILIRUBIN URINE: NEGATIVE
BLOOD, URINE: NEGATIVE
BUN BLDV-MCNC: 13 MG/DL (ref 6–20)
CALCIUM SERPL-MCNC: 8.8 MG/DL (ref 8.6–10.2)
CHLORIDE BLD-SCNC: 102 MMOL/L (ref 98–107)
CLARITY: CLEAR
CO2: 25 MMOL/L (ref 22–29)
COLOR: YELLOW
CREAT SERPL-MCNC: 0.7 MG/DL (ref 0.5–1)
EOSINOPHILS ABSOLUTE: 0.09 E9/L (ref 0.05–0.5)
EOSINOPHILS RELATIVE PERCENT: 1.9 % (ref 0–6)
EPITHELIAL CELLS, UA: ABNORMAL /HPF
GFR AFRICAN AMERICAN: >60
GFR NON-AFRICAN AMERICAN: >60 ML/MIN/1.73
GLUCOSE BLD-MCNC: 88 MG/DL (ref 74–99)
GLUCOSE URINE: NEGATIVE MG/DL
HCG(URINE) PREGNANCY TEST: NEGATIVE
HCT VFR BLD CALC: 35.4 % (ref 34–48)
HEMOGLOBIN: 11.4 G/DL (ref 11.5–15.5)
IMMATURE GRANULOCYTES #: 0.01 E9/L
IMMATURE GRANULOCYTES %: 0.2 % (ref 0–5)
KETONES, URINE: NEGATIVE MG/DL
LACTIC ACID: 0.6 MMOL/L (ref 0.5–2.2)
LEUKOCYTE ESTERASE, URINE: NEGATIVE
LIPASE: 16 U/L (ref 13–60)
LYMPHOCYTES ABSOLUTE: 1.55 E9/L (ref 1.5–4)
LYMPHOCYTES RELATIVE PERCENT: 32.6 % (ref 20–42)
MCH RBC QN AUTO: 29 PG (ref 26–35)
MCHC RBC AUTO-ENTMCNC: 32.2 % (ref 32–34.5)
MCV RBC AUTO: 90.1 FL (ref 80–99.9)
MONOCYTES ABSOLUTE: 0.46 E9/L (ref 0.1–0.95)
MONOCYTES RELATIVE PERCENT: 9.7 % (ref 2–12)
NEUTROPHILS ABSOLUTE: 2.62 E9/L (ref 1.8–7.3)
NEUTROPHILS RELATIVE PERCENT: 55.2 % (ref 43–80)
NITRITE, URINE: NEGATIVE
PDW BLD-RTO: 12.8 FL (ref 11.5–15)
PH UA: 6 (ref 5–9)
PLATELET # BLD: 229 E9/L (ref 130–450)
PMV BLD AUTO: 10.9 FL (ref 7–12)
POTASSIUM SERPL-SCNC: 4.1 MMOL/L (ref 3.5–5)
PROTEIN UA: ABNORMAL MG/DL
RBC # BLD: 3.93 E12/L (ref 3.5–5.5)
RBC UA: ABNORMAL /HPF (ref 0–2)
SODIUM BLD-SCNC: 136 MMOL/L (ref 132–146)
SPECIFIC GRAVITY UA: >=1.03 (ref 1–1.03)
TOTAL PROTEIN: 7.5 G/DL (ref 6.4–8.3)
UROBILINOGEN, URINE: 2 E.U./DL
WBC # BLD: 4.8 E9/L (ref 4.5–11.5)
WBC UA: ABNORMAL /HPF (ref 0–5)

## 2021-07-15 PROCEDURE — 81025 URINE PREGNANCY TEST: CPT

## 2021-07-15 PROCEDURE — 81001 URINALYSIS AUTO W/SCOPE: CPT

## 2021-07-15 PROCEDURE — 80053 COMPREHEN METABOLIC PANEL: CPT

## 2021-07-15 PROCEDURE — 85025 COMPLETE CBC W/AUTO DIFF WBC: CPT

## 2021-07-15 PROCEDURE — 4500000002 HC ER NO CHARGE

## 2021-07-15 PROCEDURE — 83690 ASSAY OF LIPASE: CPT

## 2021-07-15 PROCEDURE — 83605 ASSAY OF LACTIC ACID: CPT

## 2021-07-15 ASSESSMENT — PAIN SCALES - GENERAL: PAINLEVEL_OUTOF10: 7

## 2021-07-15 ASSESSMENT — PAIN DESCRIPTION - PAIN TYPE: TYPE: ACUTE PAIN

## 2021-07-15 ASSESSMENT — PAIN DESCRIPTION - FREQUENCY: FREQUENCY: INTERMITTENT

## 2021-07-15 ASSESSMENT — PAIN DESCRIPTION - DESCRIPTORS: DESCRIPTORS: CRAMPING

## 2021-07-15 ASSESSMENT — PAIN DESCRIPTION - LOCATION: LOCATION: ABDOMEN

## 2021-07-16 ENCOUNTER — HOSPITAL ENCOUNTER (EMERGENCY)
Age: 26
Discharge: LEFT BEFORE DISCHARGE | End: 2021-07-16
Payer: COMMERCIAL

## 2021-07-16 NOTE — ED NOTES
FIRST PROVIDER CONTACT ASSESSMENT NOTE      Department of Emergency Medicine   7/15/21  11:02 PM EDT    Chief Complaint: Diarrhea (with cramping, since yesterday)      History of Present Illness:   Kiley Doyle is a 27 y.o. female who presents to the ED for    Medical History:  has a past medical history of ADHD (attention deficit hyperactivity disorder), Allergic, Depression, Emotional disorder, and Traumatic injury during pregnancy, third trimester. Surgical History:  has no past surgical history on file. Social History:  reports that she has been smoking cigarettes. She has never used smokeless tobacco. She reports that she does not drink alcohol and does not use drugs. Family History: family history includes Hypertension in her father and mother. *ALLERGIES*     Patient has no known allergies.      Physical Exam:      VS:  Pulse 116   Temp 98.2 °F (36.8 °C)   Resp 17   SpO2 95%      Initial Plan of Care:  Initiate Treatment-Testing, Proceed toTreatment Area When Bed Available for ED Attending/MLP to Continue Care    -----------------END OF FIRST PROVIDER CONTACT ASSESSMENT NOTE--------------  Electronically signed by BARBY Silva CNP   DD: 7/15/21             BARBY Canales CNP  07/15/21 1544

## 2021-07-16 NOTE — ED NOTES
Pt coming to pivot desk wanting to leave, Provider LISA Duenas notified and out talking with pt, encouraged to stay  If wanting to see Layla Dang Dr, pt voiced needing to go get kids and left er dept at this time.      Luisa Reich, IQRA  16/29/95 4132

## 2021-07-17 ENCOUNTER — APPOINTMENT (OUTPATIENT)
Dept: ULTRASOUND IMAGING | Age: 26
End: 2021-07-17
Payer: COMMERCIAL

## 2021-07-17 ENCOUNTER — APPOINTMENT (OUTPATIENT)
Dept: CT IMAGING | Age: 26
End: 2021-07-17
Payer: COMMERCIAL

## 2021-07-17 ENCOUNTER — HOSPITAL ENCOUNTER (EMERGENCY)
Age: 26
Discharge: HOME | End: 2021-07-17
Attending: EMERGENCY MEDICINE
Payer: COMMERCIAL

## 2021-07-17 VITALS
RESPIRATION RATE: 16 BRPM | SYSTOLIC BLOOD PRESSURE: 120 MMHG | HEART RATE: 91 BPM | OXYGEN SATURATION: 100 % | DIASTOLIC BLOOD PRESSURE: 78 MMHG | TEMPERATURE: 98.6 F | HEIGHT: 64 IN | WEIGHT: 180 LBS | BODY MASS INDEX: 30.73 KG/M2

## 2021-07-17 DIAGNOSIS — R19.7 NAUSEA VOMITING AND DIARRHEA: Primary | ICD-10-CM

## 2021-07-17 DIAGNOSIS — R11.2 NAUSEA VOMITING AND DIARRHEA: Primary | ICD-10-CM

## 2021-07-17 DIAGNOSIS — N83.201 CYST OF RIGHT OVARY: ICD-10-CM

## 2021-07-17 LAB
ALBUMIN SERPL-MCNC: 3.9 G/DL (ref 3.5–5.2)
ALP BLD-CCNC: 107 U/L (ref 35–104)
ALT SERPL-CCNC: 42 U/L (ref 0–32)
ANION GAP SERPL CALCULATED.3IONS-SCNC: 9 MMOL/L (ref 7–16)
AST SERPL-CCNC: 30 U/L (ref 0–31)
BACTERIA: ABNORMAL /HPF
BASOPHILS ABSOLUTE: 0.01 E9/L (ref 0–0.2)
BASOPHILS RELATIVE PERCENT: 0.2 % (ref 0–2)
BILIRUB SERPL-MCNC: 0.3 MG/DL (ref 0–1.2)
BILIRUBIN URINE: ABNORMAL
BLOOD, URINE: NEGATIVE
BUN BLDV-MCNC: 10 MG/DL (ref 6–20)
CALCIUM SERPL-MCNC: 8.7 MG/DL (ref 8.6–10.2)
CHLORIDE BLD-SCNC: 100 MMOL/L (ref 98–107)
CLARITY: CLEAR
CO2: 25 MMOL/L (ref 22–29)
COLOR: YELLOW
CREAT SERPL-MCNC: 0.7 MG/DL (ref 0.5–1)
EOSINOPHILS ABSOLUTE: 0.12 E9/L (ref 0.05–0.5)
EOSINOPHILS RELATIVE PERCENT: 2.1 % (ref 0–6)
EPITHELIAL CELLS, UA: ABNORMAL /HPF
GFR AFRICAN AMERICAN: >60
GFR NON-AFRICAN AMERICAN: >60 ML/MIN/1.73
GLUCOSE BLD-MCNC: 140 MG/DL (ref 74–99)
GLUCOSE URINE: NEGATIVE MG/DL
HCG, URINE, POC: NEGATIVE
HCT VFR BLD CALC: 35.9 % (ref 34–48)
HEMOGLOBIN: 11.9 G/DL (ref 11.5–15.5)
IMMATURE GRANULOCYTES #: 0.01 E9/L
IMMATURE GRANULOCYTES %: 0.2 % (ref 0–5)
KETONES, URINE: >=80 MG/DL
LACTIC ACID, SEPSIS: 0.6 MMOL/L (ref 0.5–1.9)
LEUKOCYTE ESTERASE, URINE: NEGATIVE
LIPASE: 19 U/L (ref 13–60)
LYMPHOCYTES ABSOLUTE: 1.81 E9/L (ref 1.5–4)
LYMPHOCYTES RELATIVE PERCENT: 31.6 % (ref 20–42)
Lab: NORMAL
MCH RBC QN AUTO: 29.3 PG (ref 26–35)
MCHC RBC AUTO-ENTMCNC: 33.1 % (ref 32–34.5)
MCV RBC AUTO: 88.4 FL (ref 80–99.9)
MONOCYTES ABSOLUTE: 0.56 E9/L (ref 0.1–0.95)
MONOCYTES RELATIVE PERCENT: 9.8 % (ref 2–12)
MUCUS: PRESENT /LPF
NEGATIVE QC PASS/FAIL: NORMAL
NEUTROPHILS ABSOLUTE: 3.22 E9/L (ref 1.8–7.3)
NEUTROPHILS RELATIVE PERCENT: 56.1 % (ref 43–80)
NITRITE, URINE: NEGATIVE
PDW BLD-RTO: 12.8 FL (ref 11.5–15)
PH UA: 6 (ref 5–9)
PLATELET # BLD: 236 E9/L (ref 130–450)
PMV BLD AUTO: 11 FL (ref 7–12)
POSITIVE QC PASS/FAIL: NORMAL
POTASSIUM SERPL-SCNC: 3.5 MMOL/L (ref 3.5–5)
PROTEIN UA: ABNORMAL MG/DL
RBC # BLD: 4.06 E12/L (ref 3.5–5.5)
RBC UA: ABNORMAL /HPF (ref 0–2)
SODIUM BLD-SCNC: 134 MMOL/L (ref 132–146)
SPECIFIC GRAVITY UA: >=1.03 (ref 1–1.03)
TOTAL PROTEIN: 7.3 G/DL (ref 6.4–8.3)
UROBILINOGEN, URINE: 2 E.U./DL
WBC # BLD: 5.7 E9/L (ref 4.5–11.5)
WBC UA: ABNORMAL /HPF (ref 0–5)

## 2021-07-17 PROCEDURE — 99284 EMERGENCY DEPT VISIT MOD MDM: CPT

## 2021-07-17 PROCEDURE — 96375 TX/PRO/DX INJ NEW DRUG ADDON: CPT

## 2021-07-17 PROCEDURE — 76856 US EXAM PELVIC COMPLETE: CPT

## 2021-07-17 PROCEDURE — 81001 URINALYSIS AUTO W/SCOPE: CPT

## 2021-07-17 PROCEDURE — 93975 VASCULAR STUDY: CPT

## 2021-07-17 PROCEDURE — 83605 ASSAY OF LACTIC ACID: CPT

## 2021-07-17 PROCEDURE — 96360 HYDRATION IV INFUSION INIT: CPT

## 2021-07-17 PROCEDURE — 74177 CT ABD & PELVIS W/CONTRAST: CPT

## 2021-07-17 PROCEDURE — 96374 THER/PROPH/DIAG INJ IV PUSH: CPT

## 2021-07-17 PROCEDURE — 83690 ASSAY OF LIPASE: CPT

## 2021-07-17 PROCEDURE — 6360000004 HC RX CONTRAST MEDICATION: Performed by: RADIOLOGY

## 2021-07-17 PROCEDURE — 6360000002 HC RX W HCPCS: Performed by: EMERGENCY MEDICINE

## 2021-07-17 PROCEDURE — 80053 COMPREHEN METABOLIC PANEL: CPT

## 2021-07-17 PROCEDURE — 85025 COMPLETE CBC W/AUTO DIFF WBC: CPT

## 2021-07-17 PROCEDURE — 2580000003 HC RX 258: Performed by: EMERGENCY MEDICINE

## 2021-07-17 RX ORDER — 0.9 % SODIUM CHLORIDE 0.9 %
1000 INTRAVENOUS SOLUTION INTRAVENOUS ONCE
Status: COMPLETED | OUTPATIENT
Start: 2021-07-17 | End: 2021-07-17

## 2021-07-17 RX ORDER — KETOROLAC TROMETHAMINE 30 MG/ML
15 INJECTION, SOLUTION INTRAMUSCULAR; INTRAVENOUS ONCE
Status: COMPLETED | OUTPATIENT
Start: 2021-07-17 | End: 2021-07-17

## 2021-07-17 RX ORDER — ONDANSETRON 2 MG/ML
4 INJECTION INTRAMUSCULAR; INTRAVENOUS ONCE
Status: COMPLETED | OUTPATIENT
Start: 2021-07-17 | End: 2021-07-17

## 2021-07-17 RX ORDER — ONDANSETRON 4 MG/1
4 TABLET, ORALLY DISINTEGRATING ORAL EVERY 8 HOURS PRN
Qty: 10 TABLET | Refills: 0 | Status: SHIPPED | OUTPATIENT
Start: 2021-07-17 | End: 2022-04-08

## 2021-07-17 RX ADMIN — IOPAMIDOL 75 ML: 755 INJECTION, SOLUTION INTRAVENOUS at 20:15

## 2021-07-17 RX ADMIN — SODIUM CHLORIDE 1000 ML: 9 INJECTION, SOLUTION INTRAVENOUS at 18:58

## 2021-07-17 RX ADMIN — KETOROLAC TROMETHAMINE 15 MG: 30 INJECTION, SOLUTION INTRAMUSCULAR; INTRAVENOUS at 19:41

## 2021-07-17 RX ADMIN — ONDANSETRON 4 MG: 2 INJECTION INTRAMUSCULAR; INTRAVENOUS at 19:05

## 2021-07-17 ASSESSMENT — PAIN SCALES - GENERAL
PAINLEVEL_OUTOF10: 3
PAINLEVEL_OUTOF10: 0

## 2021-07-17 NOTE — ED PROVIDER NOTES
Patient has no known allergies. ---------------------------------------------------PHYSICAL EXAM--------------------------------------    Constitutional/General: Alert and oriented x3, well appearing, non toxic in NAD; overweight  Head: Normocephalic and atraumatic  Eyes: PERRL, EOMI, conjunctive normal, sclera non icteric  Mouth: Oropharynx clear, handling secretions, no trismus, no asymmetry of the posterior oropharynx or uvular edema  Neck: Supple, full ROM, non tender to palpation in the midline, no stridor, no crepitus, no meningeal signs  Respiratory: Lungs clear to auscultation bilaterally, no wheezes, rales, or rhonchi. Not in respiratory distress  Cardiovascular:  Regular rate. Regular rhythm. No murmurs, gallops, or rubs. 2+ distal pulses  Chest: No chest wall tenderness  GI:  Abdomen Soft, Non tender, Non distended. +BS. No organomegaly, no palpable masses,  No rebound, guarding, or rigidity. No Frazier sign. No McBurney's point tenderness. Musculoskeletal: Moves all extremities x 4. Warm and well perfused, no clubbing, cyanosis, or edema. Capillary refill <3 seconds  Integument: skin warm and dry. No rashes. Lymphatic: no lymphadenopathy noted  Neurologic: GCS 15, no focal deficits, symmetric strength 5/5 in the upper and lower extremities bilaterally  Psychiatric: Normal Affect    -------------------------------------------------- RESULTS -------------------------------------------------  I have personally reviewed all laboratory and imaging results for this patient. Results are listed below.      LABS:  Results for orders placed or performed during the hospital encounter of 07/17/21   Comprehensive Metabolic Panel   Result Value Ref Range    Sodium 134 132 - 146 mmol/L    Potassium 3.5 3.5 - 5.0 mmol/L    Chloride 100 98 - 107 mmol/L    CO2 25 22 - 29 mmol/L    Anion Gap 9 7 - 16 mmol/L    Glucose 140 (H) 74 - 99 mg/dL    BUN 10 6 - 20 mg/dL    CREATININE 0.7 0.5 - 1.0 mg/dL    GFR Non-African American >60 >=60 mL/min/1.73    GFR African American >60     Calcium 8.7 8.6 - 10.2 mg/dL    Total Protein 7.3 6.4 - 8.3 g/dL    Albumin 3.9 3.5 - 5.2 g/dL    Total Bilirubin 0.3 0.0 - 1.2 mg/dL    Alkaline Phosphatase 107 (H) 35 - 104 U/L    ALT 42 (H) 0 - 32 U/L    AST 30 0 - 31 U/L   CBC Auto Differential   Result Value Ref Range    WBC 5.7 4.5 - 11.5 E9/L    RBC 4.06 3.50 - 5.50 E12/L    Hemoglobin 11.9 11.5 - 15.5 g/dL    Hematocrit 35.9 34.0 - 48.0 %    MCV 88.4 80.0 - 99.9 fL    MCH 29.3 26.0 - 35.0 pg    MCHC 33.1 32.0 - 34.5 %    RDW 12.8 11.5 - 15.0 fL    Platelets 508 179 - 448 E9/L    MPV 11.0 7.0 - 12.0 fL    Neutrophils % 56.1 43.0 - 80.0 %    Immature Granulocytes % 0.2 0.0 - 5.0 %    Lymphocytes % 31.6 20.0 - 42.0 %    Monocytes % 9.8 2.0 - 12.0 %    Eosinophils % 2.1 0.0 - 6.0 %    Basophils % 0.2 0.0 - 2.0 %    Neutrophils Absolute 3.22 1.80 - 7.30 E9/L    Immature Granulocytes # 0.01 E9/L    Lymphocytes Absolute 1.81 1.50 - 4.00 E9/L    Monocytes Absolute 0.56 0.10 - 0.95 E9/L    Eosinophils Absolute 0.12 0.05 - 0.50 E9/L    Basophils Absolute 0.01 0.00 - 0.20 E9/L   Lipase   Result Value Ref Range    Lipase 19 13 - 60 U/L   Urinalysis   Result Value Ref Range    Color, UA Yellow Straw/Yellow    Clarity, UA Clear Clear    Glucose, Ur Negative Negative mg/dL    Bilirubin Urine SMALL (A) Negative    Ketones, Urine >=80 (A) Negative mg/dL    Specific Gravity, UA >=1.030 1.005 - 1.030    Blood, Urine Negative Negative    pH, UA 6.0 5.0 - 9.0    Protein, UA TRACE Negative mg/dL    Urobilinogen, Urine 2.0 (A) <2.0 E.U./dL    Nitrite, Urine Negative Negative    Leukocyte Esterase, Urine Negative Negative   Lactate, Sepsis   Result Value Ref Range    Lactic Acid, Sepsis 0.6 0.5 - 1.9 mmol/L   Microscopic Urinalysis   Result Value Ref Range    Mucus, UA Present (A) None Seen /LPF    WBC, UA 1-3 0 - 5 /HPF    RBC, UA NONE 0 - 2 /HPF    Epithelial Cells, UA MANY /HPF    Bacteria, UA RARE (A) None Seen /HPF   POC Pregnancy Urine Qual   Result Value Ref Range    HCG, Urine, POC Negative Negative    Lot Number [id number]     Positive QC Pass/Fail Pass     Negative QC Pass/Fail Pass        RADIOLOGY:  Interpreted by Radiologist.  [address] DUP ABD PEL RETRO SCROT COMPLETE   Final Result   Unremarkable exam.  No Doppler evidence of ovarian torsion. US PELVIS COMPLETE   Final Result   4.2 x 3.5 cm septated right ovarian cyst.  Consider follow-up in 6 weeks to   check for resolution. Normal Doppler evaluation. CT ABDOMEN PELVIS W IV CONTRAST Additional Contrast? None   Final Result   1. Multiple nonspecific prominent mesenteric lymph nodes. 2.  No bowel obstruction, free air, or focal inflammatory changes. 3.  Cyst at level of right adnexa measures 4.4 x 4.3 cm.      4.  Partially calcified mass along the right aspect of the uterus could   indicate fibroid. Ultrasound may be helpful for further evaluation. 5.  Fat containing umbilical hernia. 6.  Hepatic steatosis. 7.  Cyst associated with left kidney measures 1.3 x 1.2 cm. Ultrasound   follow-up could be helpful for further evaluation.       RECOMMENDATIONS:   Managing Incidental Adnexal Cystic Mass by CT or MR      Benign cyst:      Premenopausal (< or equal to 50 years if LMP unknown)      < or equal to 5 cm: no follow up      >5 cm: US in 6-12 weeks      > or equal to 10 cm: US promptly      Early postmenopausal (0-5 years after LMP)      < or equal to 3 cm: no follow up      3-5 cm: US in 6-12 weeks      >5 cm: US promptly      Late postmenopausal      < or equal to 3 cm: no follow up      >3 cm: US promptly      Probably benign cyst : {benign appearing except demonstrates one or more of   the following - (a) angulated margin, (b) not round/oval shape, (c) not well   imaged due to artifact or technical parameters (ex. noncontrast CT)      Premenopausal (< than or equal to 50 years if LMP unknown)      < or equal to 3 cm: no follow up      3-5 cm: US in 6-12 weeks      >5 cm: US      Early postmenopausal      < or equal to 3 cm: no follow up      >3 cm: US promptly      Late postmenopausal      < or equal to 1 cm: no follow up      >1 cm: US      Reference:      Wilma Spears et al. Managing Incidental Findings on Abdominal CT: White Paper of   the ACR Incidental Findings Committee. J Am Angela Radiol [id number];4:[id number]                 ------------------------- NURSING NOTES AND VITALS REVIEWED ---------------------------   The nursing notes within the ED encounter and vital signs as below have been reviewed by myself. /78   Pulse 91   Temp 98.6 °F (37 °C) (Oral)   Resp 16   Ht 5' 4\" (1.626 m)   Wt 180 lb (81.6 kg)   LMP 06/25/2021   SpO2 100%   BMI 30.90 kg/m²   Oxygen Saturation Interpretation: Normal    The patients available past medical records and past encounters were reviewed. ------------------------------ ED COURSE/MEDICAL DECISION MAKING----------------------  Medications   0.9 % sodium chloride bolus (0 mLs Intravenous Stopped 7/17/21 1935)   ondansetron (ZOFRAN) injection 4 mg (4 mg Intravenous Given 7/17/21 1905)   ketorolac (TORADOL) injection 15 mg (15 mg Intravenous Given 7/17/21 1941)   iopamidol (ISOVUE-370) 76 % injection 75 mL (75 mLs Intravenous Given 7/17/21 2015)         ED COURSE:  ED Course as of Jul 17 2245   Sat Jul 17, 2021 2222 Abdominal exam is soft and nontender. Patient is able tolerate p.o. fluid she is comfortable to plan for discharge. She will be given a PCP to follow-up with Zofran for home use as needed. She will slowly advance her diet. She will also follow-up with her OB/GYN Dr. Shan Chin regarding the right ovarian cyst.  No pain at this time on discharge. Patient stable. [KK]      ED Course User Index  [KK] Skylar Zarco MD       Medical Decision Making:    Patient is a pleasant 61-year-old comes in with 3 days of diarrhea and occasional nausea and vomiting.   Denies any sick contacts no fevers or chills no flank pain occasional abdominal cramping. She will CT imaging to rule out acute pathology. No Frazier sign no McBurney's point tenderness. Will give IV hydration and Zofran will check lab work and urine. Differential diagnosis is diverticulitis viral syndrome gastroenteritis dehydration ALISSA UTI pregnancy      This patient has remained hemodynamically stable during their ED course. Patient had a CBC is normal white count of 5.7. Chemistry was normal with a normal creatinine of 0.7 LFTs were normal.Lipase was normal urinalysis is negative for acute infection lactic acid was normal no signs of dehydration. Urine pregnancy was negative. Patient was symptomatically treated with IV fluids and IV Zofran as well as a dose of Toradol. She had a CT abdomen pelvis that showed no acute abnormalities no obstruction no free air. Normal appendix normal gallbladder. There was a cyst in the right adnexa and recommended further evaluation with ultrasound. Patient did have an ultrasound that shows a 4 x 3 cm septated right ovarian cyst recommend outpatient follow-up normal Doppler flow to both ovaries. No signs of torsion. Patient is feeling markedly better. Reviewed on exam soft nontender she has no abdominal pain. She is able tolerate p.o. fluids no vomiting here in the department. .  I do feel her vomiting diarrhea likely due to viral syndrome we will send her home with a prescription for Zofran. To follow-up outpatient with her GYN Dr. Titi Turner regarding the incidental finding of the right ovarian cyst.  She will also be given outpatient family practice follow-up as well. She is comfortable to plan for discharge and return to ER for any new or worsening symptoms. Patient stable for discharge. Re-Evaluations:             Re-evaluation.   Patients symptoms are improving    Re-examination  7/17/21   6:41 PM EDT          Vital Signs:   Vitals:    07/17/21 1810 07/17/21 1813 07/17/21 2005 07/17/21 2226   BP:  115/76 120/74 120/78   Pulse:  95 96 91   Resp: 16  15 16   Temp:  98.6 °F (37 °C)     TempSrc:  Oral     SpO2: 98%  99% 100%   Weight: 180 lb (81.6 kg)      Height: 5' 4\" (1.626 m)              Counseling: The emergency provider has spoken with the patient and discussed todays results, in addition to providing specific details for the plan of care and counseling regarding the diagnosis and prognosis. Questions are answered at this time and they are agreeable with the plan.       --------------------------------- IMPRESSION AND DISPOSITION ---------------------------------    IMPRESSION  1. Nausea vomiting and diarrhea Improving   2. Cyst of right ovary        DISPOSITION  Disposition: Discharge to home  Patient condition is stable    NOTE: This report was transcribed using voice recognition software.  Every effort was made to ensure accuracy; however, inadvertent computerized transcription errors may be presentHayden Mcdonald MD  07/17/21 2947

## 2022-04-08 ENCOUNTER — ANCILLARY PROCEDURE (OUTPATIENT)
Dept: OBGYN CLINIC | Age: 27
End: 2022-04-08
Payer: COMMERCIAL

## 2022-04-08 ENCOUNTER — INITIAL PRENATAL (OUTPATIENT)
Dept: OBGYN CLINIC | Age: 27
End: 2022-04-08
Payer: COMMERCIAL

## 2022-04-08 VITALS
BODY MASS INDEX: 29.42 KG/M2 | SYSTOLIC BLOOD PRESSURE: 110 MMHG | DIASTOLIC BLOOD PRESSURE: 73 MMHG | WEIGHT: 171.38 LBS | HEART RATE: 76 BPM

## 2022-04-08 DIAGNOSIS — Z3A.29 29 WEEKS GESTATION OF PREGNANCY: Primary | ICD-10-CM

## 2022-04-08 PROBLEM — Z3A.38 PREGNANCY WITH 38 COMPLETED WEEKS GESTATION: Status: RESOLVED | Noted: 2018-12-31 | Resolved: 2022-04-08

## 2022-04-08 PROBLEM — Z3A.30 30 WEEKS GESTATION OF PREGNANCY: Status: RESOLVED | Noted: 2018-11-06 | Resolved: 2022-04-08

## 2022-04-08 LAB
GLUCOSE URINE, POC: NORMAL
PROTEIN UA: POSITIVE

## 2022-04-08 PROCEDURE — 99203 OFFICE O/P NEW LOW 30 MIN: CPT | Performed by: OBSTETRICS & GYNECOLOGY

## 2022-04-08 PROCEDURE — 76811 OB US DETAILED SNGL FETUS: CPT | Performed by: OBSTETRICS & GYNECOLOGY

## 2022-04-08 PROCEDURE — 81002 URINALYSIS NONAUTO W/O SCOPE: CPT | Performed by: OBSTETRICS & GYNECOLOGY

## 2022-04-08 PROCEDURE — 76817 TRANSVAGINAL US OBSTETRIC: CPT | Performed by: OBSTETRICS & GYNECOLOGY

## 2022-04-08 PROCEDURE — 99999 PR OFFICE/OUTPT VISIT,PROCEDURE ONLY: CPT | Performed by: OBSTETRICS & GYNECOLOGY

## 2022-04-08 NOTE — PROGRESS NOTES
States baby is active Denies bleeding leakage of fluid or contractions. Instructed to Do kick counts daily  Good to do same time of day for the same amount of time  Should have 10 kick /if you notice a change in how long it takes to get 10 movements; then decrease in fetal movement call OB. If not able to get OB, go directly to L&D due to decreased fetal movement verbalized understanding    Call your obstetrician for:    Bleeding, leakage of fluid, abdominal tenderness, headaches, burred vision or right upper quadrant  pain or decreased fetal movement or increased in urinary frequency/burning.    Call if any questions or concerns

## 2022-04-08 NOTE — PROGRESS NOTES
current facility-administered medications for this visit. Social History     Socioeconomic History    Marital status: Single     Spouse name: None    Number of children: None    Years of education: None    Highest education level: None   Occupational History    None   Tobacco Use    Smoking status: Former Smoker     Types: Cigarettes     Quit date: 1/3/2015     Years since quittin.2    Smokeless tobacco: Never Used    Tobacco comment: quit 2 years ago0   Vaping Use    Vaping Use: Never used   Substance and Sexual Activity    Alcohol use: No     Comment: before pregnant    Drug use: No     Comment: last used 2 years ago    Sexual activity: Yes     Partners: Male   Other Topics Concern    None   Social History Narrative    None     Social Determinants of Health     Financial Resource Strain:     Difficulty of Paying Living Expenses: Not on file   Food Insecurity:     Worried About Running Out of Food in the Last Year: Not on file    Jeannine of Food in the Last Year: Not on file   Transportation Needs:     Lack of Transportation (Medical): Not on file    Lack of Transportation (Non-Medical):  Not on file   Physical Activity:     Days of Exercise per Week: Not on file    Minutes of Exercise per Session: Not on file   Stress:     Feeling of Stress : Not on file   Social Connections:     Frequency of Communication with Friends and Family: Not on file    Frequency of Social Gatherings with Friends and Family: Not on file    Attends Orthodoxy Services: Not on file    Active Member of Clubs or Organizations: Not on file    Attends Club or Organization Meetings: Not on file    Marital Status: Not on file   Intimate Partner Violence:     Fear of Current or Ex-Partner: Not on file    Emotionally Abused: Not on file    Physically Abused: Not on file    Sexually Abused: Not on file   Housing Stability:     Unable to Pay for Housing in the Last Year: Not on file    Number of [facility] in the Last Year: Not on file    Unstable Housing in the Last Year: Not on file          FAMILY MEDICAL HISTORY:   Family History   Problem Relation Age of Onset    Hypertension Mother     Hypertension Father               PHYSICAL EXAMINATION:  /73   Pulse 76   Wt 171 lb 6 oz (77.7 kg)   LMP 09/11/2021 (Approximate)   Body mass index is 29.42 kg/m². Urine dipstick:  Results for POC orders placed in visit on 04/08/22   POCT urine qual dipstick protein   Result Value Ref Range    Protein, UA Positive (A) Negative   POCT urine qual dipstick glucose   Result Value Ref Range    Glucose, UA POC neg         A/an Level II ultrasound was done in our office today. Please refer to the enclosed copy of the ultrasound report for further information. Discussion:  1. The live intrauterine gestation in a vertex presentation. Fetal cardiac motion, fetal motion, and fetal tone was observed and appeared to be grossly normal.   2.  No anomalies were noted. 3. Amniotic fluid volume was normal.  Placenta is anterior fundal.  4.         IMPRESSION:  1. Single intrauterine gestation at 29+ weeks with no anomalies noted and consistent with last menstrual period dating cephalic. 2. The fetal anatomy appears normal and the fetal lie is cephalic. 3. The amniotic fluid volume is normal and the placenta is anterior. RECOMMENDATIONS:  Each of the recommendations were discussed with the patient:  1. Follow-up would be otherwise as clinically indicated          The patient is to continue to follow with you in your office for ongoing obstetric care. PLAN:    As noted above or sooner prn.     Sincerely,        Gaurang Warner MD

## 2022-04-08 NOTE — PATIENT INSTRUCTIONS
Patient Education        Weeks 26 to 30 of Your Pregnancy: Care Instructions  Overview     You are now entering your last trimester of pregnancy. Your baby is growing quickly. Leticia Ambrocio probably feel your baby moving around more often. Your doctormay ask you to count your baby's kicks. Your back may ache as your body gets used to your baby's size and length. If you haven't already had the Tdap shot during this pregnancy, talk to your doctor about getting it. It will help protect your  against pertussisinfection. During this time, it's important to take care of yourself and pay attention to what your body needs. If you feel sexual, you can explore ways to be close withyour partner that match your comfort and desire. Follow-up care is a key part of your treatment and safety. Be sure to make and go to all appointments, and call your doctor if you are having problems. It's also a good idea to know your test results and keep alist of the medicines you take. How can you care for yourself at home? Take it easy at work   Take frequent breaks. If possible, stop working when you are tired, and rest during your lunch hour.  Take bathroom breaks every 2 hours.  Change positions often. If you sit for long periods, stand up and walk around.  When you stand for a long time, keep one foot on a low stool with your knee bent. After standing a lot, sit with your feet up.  Avoid fumes, chemicals, and tobacco smoke. Be sexual in your own way   Having sex during pregnancy is okay, unless your doctor tells you not to.  You may be very interested in sex, or you may have no interest at all.  Your growing belly can make it hard to find a good position during intercourse. [place] and explore.  You may get cramps in your uterus when your partner touches your breasts.  A back rub may relieve the backache or cramps that sometimes follow orgasm. Learn about  labor   Watch for signs of  labor.  You may be going into labor if:  ? You have menstrual-like cramps, with or without nausea. ? You have about 6 or more contractions in 1 hour, even after you have had a glass of water and are resting. ? You have a low, dull backache that does not go away when you change your position. ? You have pain or pressure in your pelvis that comes and goes in a pattern. ? You have intestinal cramping or flu-like symptoms, with or without diarrhea.  ? You notice an increase or change in your vaginal discharge. Discharge may be heavy, mucus-like, watery, or streaked with blood. ? Your water breaks.  If you think you have  labor:  ? Drink 2 or 3 glasses of water or juice. Not drinking enough fluids can cause contractions. ? Stop what you are doing, and empty your bladder. Then lie down on your left side for at least 1 hour. ? While lying on your side, find your breast bone. Put your fingers in the soft spot just below it. Move your fingers down toward your belly button to find the top of your uterus. Check to see if it is tight. ? Contractions can be weak or strong. Record your contractions for an hour. Time a contraction from the start of one contraction to the start of the next one.  ? Single or several strong contractions without a pattern are called [place] contractions. They are practice contractions but not the start of labor. They often stop if you change what you are doing. ? Call your doctor if you have regular contractions. Where can you learn more? Go to https://Quietymepatricio.healthQalendra. org and sign in to your [employer] account. Enter J281 in the Clicknation box to learn more about \"Weeks 26 to 30 of Your Pregnancy: Care Instructions. \"     If you do not have an account, please click on the \"Sign Up Now\" link. Current as of: 2021               Content Version: 13.2  © 9850-6338 Healthwise, Incorporated. Care instructions adapted under license by [facility] ([facility]).  If you have questions about a medical condition or this instruction, always ask your healthcare professional. [place] [address] any warranty or liability for your use of this information. Patient Education        Learning About When to Call Your Doctor During Pregnancy (After 20 Weeks)  Overview  It's common to have concerns about what might be a problem when you're pregnant. Most pregnancies don't have any serious problems. But it's still important to know when to call your doctor if you have certain symptoms orsigns of labor. These are general suggestions. Your doctor may give you some more informationabout when to call. When to call your doctor (after 20 weeks)  Call 911 anytime you think you may need emergency care. For example, call if:   You have severe vaginal bleeding.  You have sudden, severe pain in your belly.  You passed out (lost consciousness).  You have a seizure.  You see or feel the umbilical cord.  You think you are about to deliver your baby and can't make it safely to the hospital.  Call your doctor now or seek immediate medical care if:   You have vaginal bleeding.  You have belly pain.  You have a fever.  You have symptoms of preeclampsia, such as:  ? Sudden swelling of your face, hands, or feet. ? New vision problems (such as dimness, blurring, or seeing spots). ? A severe headache.  You have a sudden release of fluid from your vagina. (You think your water broke.)   You think that you may be in labor. This means that you've had at least 6 contractions in an hour.  You notice that your baby has stopped moving or is moving much less than normal.   You have symptoms of a urinary tract infection. These may include:  ? Pain or burning when you urinate. ? A frequent need to urinate without being able to pass much urine. ? Pain in the flank, which is just below the rib cage and above the waist on either side of the back. ? Blood in your urine.   Watch you may notice less kicking than when you are resting. At yourprenatal visits, your doctor will ask whether the baby is active. In your last trimester, your doctor may ask you to count the number of timesyou feel your baby move. Follow-up care is a key part of your treatment and safety. Be sure to make and go to all appointments, and call your doctor if you are having problems. It's also a good idea to know your test results and keep alist of the medicines you take. How do you count fetal kicks?  A common method of checking your baby's movement is to note the length of time it takes to count ten movements (such as kicks, flutters, or rolls).  Pick your baby's most active time of day to count. This may be any time from morning to evening.  If you don't feel 10 movements in an hour, have something to eat or drink and count for another hour. If you don't feel at least 10 movements in the 2-hour period, call your doctor. When should you call for help? Call your doctor now or seek immediate medical care if:     You noticed that your baby has stopped moving or is moving much less than normal.   Watch closely for changes in your health, and be sure to contact your doctor ifyou have any problems. Where can you learn more? Go to https://Transifex.OdinOtvet. org and sign in to your [employer] account. Enter G826 in the [facility] box to learn more about \"Counting Your Baby's Kicks: Care Instructions. \"     If you do not have an account, please click on the \"Sign Up Now\" link. Current as of: June 16, 2021               Content Version: 13.2  © 5587-6640 Healthwise, Incorporated. Care instructions adapted under license by [facility] ([facility]). If you have questions about a medical condition or this instruction, always ask your healthcare professional. [address] any warranty or liability for your use of this information.

## 2022-05-24 ENCOUNTER — HOSPITAL ENCOUNTER (EMERGENCY)
Age: 27
Discharge: HOME | End: 2022-05-24
Payer: COMMERCIAL

## 2022-05-24 VITALS
OXYGEN SATURATION: 100 % | TEMPERATURE: 98 F | HEART RATE: 99 BPM | RESPIRATION RATE: 16 BRPM | SYSTOLIC BLOOD PRESSURE: 117 MMHG | DIASTOLIC BLOOD PRESSURE: 67 MMHG

## 2022-05-24 DIAGNOSIS — J06.9 VIRAL URI WITH COUGH: Primary | ICD-10-CM

## 2022-05-24 LAB
INFLUENZA A BY PCR: NOT DETECTED
INFLUENZA B BY PCR: NOT DETECTED
SARS-COV-2, NAAT: NOT DETECTED
STREP GRP A PCR: NEGATIVE

## 2022-05-24 PROCEDURE — 6370000000 HC RX 637 (ALT 250 FOR IP): Performed by: NURSE PRACTITIONER

## 2022-05-24 PROCEDURE — 99283 EMERGENCY DEPT VISIT LOW MDM: CPT

## 2022-05-24 PROCEDURE — 87880 STREP A ASSAY W/OPTIC: CPT

## 2022-05-24 PROCEDURE — 87502 INFLUENZA DNA AMP PROBE: CPT

## 2022-05-24 PROCEDURE — 87635 SARS-COV-2 COVID-19 AMP PRB: CPT

## 2022-05-24 RX ORDER — ACETAMINOPHEN 500 MG
1000 TABLET ORAL EVERY 6 HOURS PRN
Qty: 120 TABLET | Refills: 0 | Status: ON HOLD | OUTPATIENT
Start: 2022-05-24 | End: 2022-06-05

## 2022-05-24 RX ORDER — ERYTHROMYCIN 5 MG/G
OINTMENT OPHTHALMIC
Qty: 1 EACH | Refills: 0 | Status: SHIPPED | OUTPATIENT
Start: 2022-05-24 | End: 2022-06-03

## 2022-05-24 RX ORDER — ACETAMINOPHEN 500 MG
1000 TABLET ORAL ONCE
Status: COMPLETED | OUTPATIENT
Start: 2022-05-24 | End: 2022-05-24

## 2022-05-24 RX ORDER — GUAIFENESIN/DEXTROMETHORPHAN 100-10MG/5
10 SYRUP ORAL 3 TIMES DAILY PRN
Qty: 120 ML | Refills: 0 | Status: SHIPPED | OUTPATIENT
Start: 2022-05-24 | End: 2022-06-03

## 2022-05-24 RX ORDER — FLUTICASONE PROPIONATE 50 MCG
1 SPRAY, SUSPENSION (ML) NASAL DAILY
Qty: 16 G | Refills: 0 | Status: SHIPPED | OUTPATIENT
Start: 2022-05-24

## 2022-05-24 RX ADMIN — ACETAMINOPHEN 1000 MG: 500 TABLET ORAL at 11:21

## 2022-05-24 NOTE — ED PROVIDER NOTES
[address]  Department of Emergency Medicine   ED  Encounter Note  Admit Date/RoomTime: 2022 10:09 AM  ED Room: [facility]    NAME: Kaila Bhat  : 1995  MRN: [id number]     Chief Complaint:  Pharyngitis    History of Present Illness       Kaila Bhat is a 27 y.o. old female who presents to the emergency department by private vehicle, for nasal congestion, rhinorrhea and sore throat, which began 1 week(s) prior to arrival.  Since onset the symptoms have been stable and moderate in severity. The symptoms are associated with loss of smell. There has been no chest pain, shortness of breath, hemoptysis, leg edema nausea vomiting. She states she did have several episodes of diarrhea approximately 1 week ago this has resolved. She is approximately 36 weeks pregnant. She denies any vaginal discharge or bleeding. She reports normal fetal movement. ROS   Pertinent positives and negatives are stated within HPI, all other systems reviewed and are negative. Past Medical History:  has a past medical history of Abnormal Pap smear of cervix, Allergic, Emotional disorder, Rh sensitized, and Traumatic injury during pregnancy, third trimester. Surgical History:  has no past surgical history on file. Social History:  reports that she quit smoking about 7 years ago. Her smoking use included cigarettes. She has never used smokeless tobacco. She reports that she does not drink alcohol and does not use drugs. Family History: family history includes Hypertension in her father and mother. Allergies: Patient has no known allergies. Physical Exam   Oxygen Saturation Interpretation: Normal on room air analysis.         ED Triage Vitals   BP Temp Temp Source Pulse Resp SpO2 Height Weight   22 1010 22 0955 22 1010 22 0955 22 1010 22 0955 -- --   117/66 98.3 °F (36.8 °C) Oral 126 18 99 %           · Constitutional:  Alert, development consistent with age. · Ears:  External Ears: Bilateral pain normal.              TM's & External Canals: normal TM's and external ear canals bilaterally. · Nose:   There is clear rhinorrhea. · Sinuses: no Bilateral maxillary sinus tenderness. no Bilateral frontal sinus tenderness. · Mouth:  normal tongue and buccal mucosa. · Throat: no erythema or exudates noted. Teeth and gums normal..  Airway Patent. · Neck:  Supple. No meningeal signs. There is no  preauricular, anterior cervical and posterior cervical node tenderness. · Respiratory:   Breath sounds: Bilateral normal.  Lung sounds: normal.   · CV:  Regular rate and rhythm, normal heart sounds, without pathological murmurs, ectopy, gallops, or rubs. · GI:  Abdomen Soft, nontender, good bowel sounds. No firm or pulsatile mass. · Integument:  Normal turgor. Warm, dry, without visible rash. · Neurological:  Oriented. Motor functions intact. Lab / Imaging Results   (All laboratory and radiology results have been personally reviewed by myself)  Labs:  Results for orders placed or performed during the hospital encounter of 05/24/22   Strep Screen Group A Throat    Specimen: Throat   Result Value Ref Range    Strep Grp A PCR Negative Negative   COVID-19, Rapid    Specimen: Nasopharyngeal Swab   Result Value Ref Range    SARS-CoV-2, NAAT Not Detected Not Detected   Rapid influenza A/B antigens    Specimen: Nares   Result Value Ref Range    Influenza A by PCR Not Detected Not Detected    Influenza B by PCR Not Detected Not Detected       Imaging: All Radiology results interpreted by Radiologist unless otherwise noted.   No orders to display       ED Course / Medical Decision Making     Medications   acetaminophen (TYLENOL) tablet 1,000 mg (1,000 mg Oral Given 5/24/22 1121)        Consults:   None    Procedures:   none    Medical Decision Making:   Ezra Child presented to ED with complaints of Pharyngitis    With low suspicion for pneumonia as per history/physical findings, imaging was not done. With moderate suspicion for COVID-19, testing was obtained and were negative. Influenza negative. Based on history and examination findings of [facility], the causative nature of illness is likely to be Viral in etiology. Therefore, symptomatic control with supportive meds is considered appropriate at this time. She is not hypoxic. Patient is well appearing, non toxic, meets criteria for and is appropriate for outpatient management. Normal progression of disease discussed. Explained the rationale for symptomatic treatment rather than use of an antibiotic. Extra fluids  Analgesics as needed, dosages and times reviewed. Follow up as needed should symptoms fail to improve. Assessment     1. Viral URI with cough      Plan   Discharged home. Patient condition is good    New Medications     Discharge Medication List as of 5/24/2022 12:54 PM      START taking these medications    Details   acetaminophen (TYLENOL) 500 MG tablet Take 2 tablets by mouth every 6 hours as needed for Pain or Fever Maximum dose- 8 tablets/24 hours. , Disp-120 tablet, R-0Print      fluticasone (FLONASE) 50 MCG/ACT nasal spray 1 spray by Each Nostril route daily, Disp-16 g, R-0Print      guaiFENesin-dextromethorphan (ROBITUSSIN DM) 100-10 MG/5ML syrup Take 10 mLs by mouth 3 times daily as needed for Cough, Disp-120 mL, R-0Print      erythromycin (ROMYCIN) 5 MG/GM ophthalmic ointment Apply 0.5 inch to right eye BID, Disp-1 each, R-0, Print           Electronically signed by BARBY Vega CNP   DD: 5/24/22  **This report was transcribed using voice recognition software. Every effort was made to ensure accuracy; however, inadvertent computerized transcription errors may be present.   END OF ED PROVIDER NOTE        BARBY Steve CNP  05/24/22 9479

## 2022-05-24 NOTE — Clinical Note
Bryce Johnson was seen and treated in our emergency department on 5/24/2022. She may return to work on 05/26/2022. If you have any questions or concerns, please don't hesitate to call.       Karen Mcleod, APRN - CNP

## 2022-06-05 ENCOUNTER — HOSPITAL ENCOUNTER (INPATIENT)
Age: 27
LOS: 1 days | Discharge: HOME | DRG: 560 | End: 2022-06-06
Attending: OBSTETRICS & GYNECOLOGY | Admitting: OBSTETRICS & GYNECOLOGY
Payer: COMMERCIAL

## 2022-06-05 PROBLEM — Z34.90 NORMAL PREGNANCY, UNSPECIFIED TRIMESTER: Status: ACTIVE | Noted: 2022-06-05

## 2022-06-05 LAB
ABO/RH: NORMAL
AMPHETAMINE SCREEN, URINE: NOT DETECTED
ANTIBODY SCREEN: NORMAL
BARBITURATE SCREEN URINE: NOT DETECTED
BENZODIAZEPINE SCREEN, URINE: NOT DETECTED
CANNABINOID SCREEN URINE: NOT DETECTED
COCAINE METABOLITE SCREEN URINE: NOT DETECTED
FENTANYL SCREEN, URINE: NOT DETECTED
HCT VFR BLD CALC: 29.2 % (ref 34–48)
HEMOGLOBIN: 9.4 G/DL (ref 11.5–15.5)
Lab: NORMAL
MCH RBC QN AUTO: 27.3 PG (ref 26–35)
MCHC RBC AUTO-ENTMCNC: 32.2 % (ref 32–34.5)
MCV RBC AUTO: 84.9 FL (ref 80–99.9)
METHADONE SCREEN, URINE: NOT DETECTED
OPIATE SCREEN URINE: NOT DETECTED
OXYCODONE URINE: NOT DETECTED
PDW BLD-RTO: 14.4 FL (ref 11.5–15)
PHENCYCLIDINE SCREEN URINE: NOT DETECTED
PLATELET # BLD: 265 E9/L (ref 130–450)
PMV BLD AUTO: 11.4 FL (ref 7–12)
RBC # BLD: 3.44 E12/L (ref 3.5–5.5)
WBC # BLD: 6.4 E9/L (ref 4.5–11.5)

## 2022-06-05 PROCEDURE — 86900 BLOOD TYPING SEROLOGIC ABO: CPT

## 2022-06-05 PROCEDURE — 6370000000 HC RX 637 (ALT 250 FOR IP)

## 2022-06-05 PROCEDURE — 6370000000 HC RX 637 (ALT 250 FOR IP): Performed by: OBSTETRICS & GYNECOLOGY

## 2022-06-05 PROCEDURE — 85027 COMPLETE CBC AUTOMATED: CPT

## 2022-06-05 PROCEDURE — 6360000002 HC RX W HCPCS: Performed by: OBSTETRICS & GYNECOLOGY

## 2022-06-05 PROCEDURE — 1220000000 HC SEMI PRIVATE OB R&B

## 2022-06-05 PROCEDURE — 86850 RBC ANTIBODY SCREEN: CPT

## 2022-06-05 PROCEDURE — 7200000001 HC VAGINAL DELIVERY

## 2022-06-05 PROCEDURE — 86901 BLOOD TYPING SEROLOGIC RH(D): CPT

## 2022-06-05 PROCEDURE — 80307 DRUG TEST PRSMV CHEM ANLYZR: CPT

## 2022-06-05 PROCEDURE — 99223 1ST HOSP IP/OBS HIGH 75: CPT | Performed by: STUDENT IN AN ORGANIZED HEALTH CARE EDUCATION/TRAINING PROGRAM

## 2022-06-05 PROCEDURE — 2580000003 HC RX 258: Performed by: OBSTETRICS & GYNECOLOGY

## 2022-06-05 PROCEDURE — 36415 COLL VENOUS BLD VENIPUNCTURE: CPT

## 2022-06-05 RX ORDER — MISOPROSTOL 200 UG/1
200 TABLET ORAL PRN
Status: DISCONTINUED | OUTPATIENT
Start: 2022-06-05 | End: 2022-06-06 | Stop reason: HOSPADM

## 2022-06-05 RX ORDER — SODIUM CHLORIDE, SODIUM LACTATE, POTASSIUM CHLORIDE, CALCIUM CHLORIDE 600; 310; 30; 20 MG/100ML; MG/100ML; MG/100ML; MG/100ML
INJECTION, SOLUTION INTRAVENOUS CONTINUOUS
Status: DISCONTINUED | OUTPATIENT
Start: 2022-06-05 | End: 2022-06-06 | Stop reason: HOSPADM

## 2022-06-05 RX ORDER — SODIUM CHLORIDE, SODIUM LACTATE, POTASSIUM CHLORIDE, AND CALCIUM CHLORIDE .6; .31; .03; .02 G/100ML; G/100ML; G/100ML; G/100ML
1000 INJECTION, SOLUTION INTRAVENOUS PRN
Status: DISCONTINUED | OUTPATIENT
Start: 2022-06-05 | End: 2022-06-06 | Stop reason: HOSPADM

## 2022-06-05 RX ORDER — DOCUSATE SODIUM 100 MG/1
100 CAPSULE, LIQUID FILLED ORAL 2 TIMES DAILY
Status: DISCONTINUED | OUTPATIENT
Start: 2022-06-05 | End: 2022-06-06 | Stop reason: HOSPADM

## 2022-06-05 RX ORDER — ONDANSETRON 2 MG/ML
4 INJECTION INTRAMUSCULAR; INTRAVENOUS EVERY 6 HOURS PRN
Status: DISCONTINUED | OUTPATIENT
Start: 2022-06-05 | End: 2022-06-06 | Stop reason: HOSPADM

## 2022-06-05 RX ORDER — ONDANSETRON 4 MG/1
8 TABLET, ORALLY DISINTEGRATING ORAL EVERY 8 HOURS PRN
Status: DISCONTINUED | OUTPATIENT
Start: 2022-06-05 | End: 2022-06-06 | Stop reason: HOSPADM

## 2022-06-05 RX ORDER — FERROUS SULFATE 325(65) MG
325 TABLET ORAL 2 TIMES DAILY WITH MEALS
Status: DISCONTINUED | OUTPATIENT
Start: 2022-06-05 | End: 2022-06-06 | Stop reason: HOSPADM

## 2022-06-05 RX ORDER — ACETAMINOPHEN 325 MG/1
650 TABLET ORAL EVERY 4 HOURS PRN
Status: DISCONTINUED | OUTPATIENT
Start: 2022-06-05 | End: 2022-06-06 | Stop reason: HOSPADM

## 2022-06-05 RX ORDER — LIDOCAINE HYDROCHLORIDE 10 MG/ML
INJECTION, SOLUTION INFILTRATION; PERINEURAL
Status: COMPLETED
Start: 2022-06-05 | End: 2022-06-05

## 2022-06-05 RX ORDER — SODIUM CHLORIDE, SODIUM LACTATE, POTASSIUM CHLORIDE, AND CALCIUM CHLORIDE .6; .31; .03; .02 G/100ML; G/100ML; G/100ML; G/100ML
500 INJECTION, SOLUTION INTRAVENOUS PRN
Status: DISCONTINUED | OUTPATIENT
Start: 2022-06-05 | End: 2022-06-06 | Stop reason: HOSPADM

## 2022-06-05 RX ORDER — ACETAMINOPHEN 650 MG
TABLET, EXTENDED RELEASE ORAL
Status: COMPLETED
Start: 2022-06-05 | End: 2022-06-05

## 2022-06-05 RX ORDER — CARBOPROST TROMETHAMINE 250 UG/ML
250 INJECTION, SOLUTION INTRAMUSCULAR PRN
Status: DISCONTINUED | OUTPATIENT
Start: 2022-06-05 | End: 2022-06-06 | Stop reason: HOSPADM

## 2022-06-05 RX ORDER — IBUPROFEN 800 MG/1
800 TABLET ORAL EVERY 8 HOURS PRN
Status: DISCONTINUED | OUTPATIENT
Start: 2022-06-05 | End: 2022-06-06 | Stop reason: HOSPADM

## 2022-06-05 RX ORDER — SODIUM CHLORIDE 0.9 % (FLUSH) 0.9 %
5-40 SYRINGE (ML) INJECTION PRN
Status: DISCONTINUED | OUTPATIENT
Start: 2022-06-05 | End: 2022-06-06 | Stop reason: HOSPADM

## 2022-06-05 RX ORDER — SODIUM CHLORIDE 0.9 % (FLUSH) 0.9 %
5-40 SYRINGE (ML) INJECTION EVERY 12 HOURS SCHEDULED
Status: DISCONTINUED | OUTPATIENT
Start: 2022-06-05 | End: 2022-06-06 | Stop reason: HOSPADM

## 2022-06-05 RX ORDER — IBUPROFEN 800 MG/1
TABLET ORAL
Status: COMPLETED
Start: 2022-06-05 | End: 2022-06-05

## 2022-06-05 RX ORDER — MISOPROSTOL 200 UG/1
800 TABLET ORAL PRN
Status: DISCONTINUED | OUTPATIENT
Start: 2022-06-05 | End: 2022-06-06 | Stop reason: HOSPADM

## 2022-06-05 RX ORDER — METHYLERGONOVINE MALEATE 0.2 MG/ML
200 INJECTION INTRAVENOUS PRN
Status: DISCONTINUED | OUTPATIENT
Start: 2022-06-05 | End: 2022-06-06 | Stop reason: HOSPADM

## 2022-06-05 RX ORDER — KETOROLAC TROMETHAMINE 30 MG/ML
30 INJECTION, SOLUTION INTRAMUSCULAR; INTRAVENOUS EVERY 6 HOURS PRN
Status: DISCONTINUED | OUTPATIENT
Start: 2022-06-05 | End: 2022-06-06 | Stop reason: HOSPADM

## 2022-06-05 RX ORDER — MODIFIED LANOLIN
OINTMENT (GRAM) TOPICAL PRN
Status: DISCONTINUED | OUTPATIENT
Start: 2022-06-05 | End: 2022-06-06 | Stop reason: HOSPADM

## 2022-06-05 RX ORDER — SODIUM CHLORIDE 9 MG/ML
INJECTION, SOLUTION INTRAVENOUS PRN
Status: DISCONTINUED | OUTPATIENT
Start: 2022-06-05 | End: 2022-06-06 | Stop reason: HOSPADM

## 2022-06-05 RX ORDER — SODIUM CHLORIDE 9 MG/ML
25 INJECTION, SOLUTION INTRAVENOUS PRN
Status: DISCONTINUED | OUTPATIENT
Start: 2022-06-05 | End: 2022-06-06 | Stop reason: HOSPADM

## 2022-06-05 RX ADMIN — SODIUM CHLORIDE, POTASSIUM CHLORIDE, SODIUM LACTATE AND CALCIUM CHLORIDE: 600; 310; 30; 20 INJECTION, SOLUTION INTRAVENOUS at 09:50

## 2022-06-05 RX ADMIN — Medication: at 19:45

## 2022-06-05 RX ADMIN — Medication: at 11:07

## 2022-06-05 RX ADMIN — IBUPROFEN 800 MG: 800 TABLET, FILM COATED ORAL at 11:24

## 2022-06-05 RX ADMIN — PENICILLIN G POTASSIUM 5 MILLION UNITS: 5000000 INJECTION, POWDER, FOR SOLUTION INTRAMUSCULAR; INTRAVENOUS at 10:17

## 2022-06-05 RX ADMIN — IBUPROFEN 800 MG: 800 TABLET, FILM COATED ORAL at 19:45

## 2022-06-05 RX ADMIN — Medication: at 11:24

## 2022-06-05 RX ADMIN — FERROUS SULFATE TAB 325 MG (65 MG ELEMENTAL FE) 325 MG: 325 (65 FE) TAB at 19:45

## 2022-06-05 RX ADMIN — DOCUSATE SODIUM 100 MG: 100 CAPSULE, LIQUID FILLED ORAL at 19:45

## 2022-06-05 RX ADMIN — Medication 166.7 ML: at 11:12

## 2022-06-05 RX ADMIN — ACETAMINOPHEN 650 MG: 325 TABLET ORAL at 12:26

## 2022-06-05 ASSESSMENT — PAIN DESCRIPTION - LOCATION
LOCATION: VAGINA
LOCATION: ABDOMEN

## 2022-06-05 ASSESSMENT — PAIN SCALES - GENERAL
PAINLEVEL_OUTOF10: 10
PAINLEVEL_OUTOF10: 7
PAINLEVEL_OUTOF10: 9

## 2022-06-05 ASSESSMENT — PAIN DESCRIPTION - ORIENTATION: ORIENTATION: LOWER

## 2022-06-05 ASSESSMENT — PAIN DESCRIPTION - DESCRIPTORS
DESCRIPTORS: CRAMPING
DESCRIPTORS: BURNING

## 2022-06-05 ASSESSMENT — PAIN - FUNCTIONAL ASSESSMENT: PAIN_FUNCTIONAL_ASSESSMENT: ACTIVITIES ARE NOT PREVENTED

## 2022-06-05 NOTE — PROGRESS NOTES
Patient admitted to Mom/Baby Unit. Admission papers reviewed. Patient's rights and responsibilities reviewed and verbalized understanding. Assessment as charted. Bleeding small amount, no clots. Birth Mother gave the OK for baby to receive HEP B Vaccine. Patient had TDAP last pregnancy. Instructed call light and the phone number to call for the RN. Spoke with Beto Martinez at [employer]. She gave me the names of the people wanting to adopt baby. [employer] and [employer]. Zachery Chowdary stated she wants baby to come back in her room after the bath. Dr Kam Faye called to get stronger pain medication. New orders given.

## 2022-06-05 NOTE — PROGRESS NOTES
Department of Obstetrics and Gynecology  Labor and Delivery  Attending History and Pysical       Subjective:   Pavel July is a 27 y.o. female [id number] at 41w10d here with contractions    -LOF, -VB, +FM, +Contractions  No hx of asthma, HTN. No preeclampsia symptoms of HA, visual changes,  RUQ pain. Past Medical History  Past Medical History:   Diagnosis Date    Abnormal Pap smear of cervix     in 2021 - precancerous cells    Allergic     seasonal    Emotional disorder     Rh sensitized     Traumatic injury during pregnancy, third trimester        Past Surgical History  Non contributory    Allergies  No Known Allergies    Family History: Non contributory    Social History: Denies smoking, alcohol, drugs    Physical Exam:    CONSTITUTIONAL:  awake, alert, cooperative, no apparent distress  LUNGS:  No increased work of breathing, CTAB  CARDIOVASCULAR:  RRR  ABDOMEN:  no rebound or guarding . EXTREMETIES:  Minimal edema. PELVIC: Normal external genitalia. Vaginal canal without blood or pooling. Cervix normal.     SVE:6/90/-2    FHRT:  Baseline: 145  Variability: Moderate  Accels: Present  Decels: None     [place]: q 2-3    Membranes: Intact      Labs:  No visits with results within 1 Day(s) from this visit. Latest known visit with results is:   Admission on 05/24/2022, Discharged on 05/24/2022   Component Date Value Ref Range Status    Strep Grp A PCR 05/24/2022 Negative  Negative Final    Negative for Strep A nucleic acid.  SARS-CoV-2, NAAT 05/24/2022 Not Detected  Not Detected Final    Comment: Rapid NAAT:   Negative results should be treated as presumptive and,  if inconsistent with clinical signs and symptoms or necessary for  patient management, should be tested with an alternative molecular  assay. Negative results do not preclude SARS-CoV-2 infection and  should not be used as the sole basis for patient management decisions.   This test has been authorized by the FDA under an Emergency Use  Authorization (EUA) for use by authorized laboratories. Fact sheet for Healthcare Providers:  Lorie.es  Fact sheet for Patients: Lroie.hermann    METHODOLOGY: Isothermal Nucleic Acid Amplification      Influenza A by PCR 05/24/2022 Not Detected  Not Detected Final    Influenza B by PCR 05/24/2022 Not Detected  Not Detected Final       Assessment/Plan:      Maryam Barroso is a 27 y.o. [id number] at 37w6d here for Labor  -Labor: expectant management  -PNL: Rh+/RI  -ID: GBS neg, afebrile  -Pain: per pt pref  -FWB: Category I.  Will continue to monitor  -Vertex by sutures  -Type & screen    Leena Burrows MD, MPH

## 2022-06-05 NOTE — PROCEDURES
Patient Name: Kizzy Alicea Record Number: [id number]  Date: 2022   Time: 11:16 AM   Room/Bed: LD08/LD08-A  Vaginal delivery Procedure Note  Indication: Intrauterine pregnancy at 37 weeks and 6 days in labor    Consent: Signed    Procedure: The patient was placed in the dorsal lithotomy position. Anesthesia was unable to be provided due to the emergent nature of the delivery. The infant was delivered in the occiput posterior position by spontaneous vaginal delivery. A nuchal cord was not present. An episiotomy was not needed. The umbilical cord was double clamped and cut. The infant was placed under a warmer. The placenta was delivered with gentle traction and was noted to be intact and whole. Estimated blood loss was 50 ml. Time of Birth (): 1107 hours    Infant's Apgar Score at 1 Minute: 9, at 5 minutes: 9  Additional items performed: Pitocin, 30 milliunits in 500 mL of ringers lactate was administered, wide open, to assist with uterine contraction  The patient tolerated the procedure well.   Complications: None  Electronically Signed by: @veronika@

## 2022-06-05 NOTE — CARE COORDINATION
Received call from Sara Kennedy RN, L&D. States pt has just delivered baby boy today; mother is currently involved in the adoption process. States adoptive parents are here to [place] papers\". Eventually spoke by phone with Perez Dai from [employer] [facility]. ([phone]). She states Mom has full control over decisions for baby until the Adoption Agency meets with her tomorrow to finalize  papers. adoptive parents can be involved only at the discretion of Mom Kylee Yuen until then. Mom/ Baby Staff aware of contents of conversation. per Elif Miller , if Kylee Yuen would allow for adoptive parents to be involved with baby, ID bands could be provided with documented proof of ID. [employer] [facility] apparently notified parents of delivery per adoptive mom per Joselyn Lomas RN. Staff will update adoptive parents. Will have SW follow in am.Shruti Yeung RN,CM.

## 2022-06-05 NOTE — PROGRESS NOTES
[address] [place] [facility] for prenatal information. Rep said they had prenatals from 4/6/22 and they would send them over.

## 2022-06-05 NOTE — PROGRESS NOTES
Mother made a strict do not report. Man of the name Serg Howard called demanding to speak to patient. Patient stated she had restraining order on him. No patient information given. Police notified.

## 2022-06-05 NOTE — PROGRESS NOTES
[facility] to request prenatal labs. Rep said they did not have any prenatal results for patient.

## 2022-06-05 NOTE — PROGRESS NOTES
37w6d presents to labor and delivery with complaints of contractions that started around 0740, pt denies any leaking or bleeding, and states good fetal movement. Placed on efm. Pt planning adoption, cant remember name of company at this time, pt spoke with their .

## 2022-06-05 NOTE — PROGRESS NOTES
Contacted Pathology labs to inquire about prenatal labs. Rep said they do not have any prenatal results for patient.

## 2022-06-05 NOTE — H&P
Department of Obstetrics and Gynecology  Labor and Delivery  Attending History and Pysical         Subjective:   Demetrius Cuenca is a 27 y.o. female [id number] at 41w10d here with contractions     -LOF, -VB, +FM, +Contractions  No hx of asthma, HTN. No preeclampsia symptoms of HA, visual changes,  RUQ pain.     Past Medical History  Past Medical History        Past Medical History:   Diagnosis Date    Abnormal Pap smear of cervix       in 2021 - precancerous cells    Allergic       seasonal    Emotional disorder      Rh sensitized      Traumatic injury during pregnancy, third trimester              Past Surgical History  Non contributory     Allergies  No Known Allergies     Family History: Non contributory     Social History: Denies smoking, alcohol, drugs     Physical Exam:     CONSTITUTIONAL:  awake, alert, cooperative, no apparent distress  LUNGS:  No increased work of breathing, CTAB  CARDIOVASCULAR:  RRR  ABDOMEN:  no rebound or guarding . EXTREMETIES:  Minimal edema. PELVIC: Normal external genitalia. Vaginal canal without blood or pooling. Cervix normal.      SVE:6/90/-2     FHRT:  Baseline: 145  Variability: Moderate  Accels: Present  Decels: None      [place]: q 2-3     Membranes: Intact       Labs:  No visits with results within 1 Day(s) from this visit. Latest known visit with results is:   Admission on 05/24/2022, Discharged on 05/24/2022   Component Date Value Ref Range Status    Strep Grp A PCR 05/24/2022 Negative  Negative Final     Negative for Strep A nucleic acid.  SARS-CoV-2, NAAT 05/24/2022 Not Detected  Not Detected Final     Comment: Rapid NAAT:   Negative results should be treated as presumptive and,  if inconsistent with clinical signs and symptoms or necessary for  patient management, should be tested with an alternative molecular  assay. Negative results do not preclude SARS-CoV-2 infection and  should not be used as the sole basis for patient management decisions.   This test has been authorized by the FDA under an Emergency Use  Authorization (EUA) for use by authorized laboratories.     Fact sheet for Healthcare Providers:  [place]  Fact sheet for Patients: [place]     METHODOLOGY: Isothermal Nucleic Acid Amplification       Influenza A by PCR 05/24/2022 Not Detected  Not Detected Final    Influenza B by PCR 05/24/2022 Not Detected  Not Detected Final         Assessment/Plan:       Joshua Frances is a 27 y.o. [id number] at 37w6d here for Labor  -Labor: expectant management  -PNL: Rh+/RI  -ID: GBS neg, afebrile  -Pain: per pt pref  -FWB: Category I.  Will continue to monitor  -Vertex by sutures  -Type & screen     Bari Pulido MD, MPH

## 2022-06-05 NOTE — PROGRESS NOTES
Spoke with lab [facility], unable to fax prenatal labs over d/t issues with connection. Able to verbally receive labs over the phone. Results from 4/2/22. States they will attempt to fax the labs again later.

## 2022-06-06 VITALS
SYSTOLIC BLOOD PRESSURE: 106 MMHG | OXYGEN SATURATION: 98 % | TEMPERATURE: 98.6 F | RESPIRATION RATE: 18 BRPM | WEIGHT: 177 LBS | BODY MASS INDEX: 30.22 KG/M2 | HEART RATE: 76 BPM | HEIGHT: 64 IN | DIASTOLIC BLOOD PRESSURE: 59 MMHG

## 2022-06-06 LAB
HCT VFR BLD CALC: 28.6 % (ref 34–48)
HEMOGLOBIN: 9.2 G/DL (ref 11.5–15.5)
MCH RBC QN AUTO: 27.6 PG (ref 26–35)
MCHC RBC AUTO-ENTMCNC: 32.2 % (ref 32–34.5)
MCV RBC AUTO: 85.9 FL (ref 80–99.9)
PDW BLD-RTO: 14.1 FL (ref 11.5–15)
PLATELET # BLD: 258 E9/L (ref 130–450)
PMV BLD AUTO: 11.7 FL (ref 7–12)
RBC # BLD: 3.33 E12/L (ref 3.5–5.5)
WBC # BLD: 9.7 E9/L (ref 4.5–11.5)

## 2022-06-06 PROCEDURE — 6370000000 HC RX 637 (ALT 250 FOR IP): Performed by: OBSTETRICS & GYNECOLOGY

## 2022-06-06 PROCEDURE — 85027 COMPLETE CBC AUTOMATED: CPT

## 2022-06-06 PROCEDURE — 36415 COLL VENOUS BLD VENIPUNCTURE: CPT

## 2022-06-06 RX ORDER — DOCUSATE SODIUM 100 MG/1
100 CAPSULE, LIQUID FILLED ORAL 2 TIMES DAILY
Qty: 60 CAPSULE | Refills: 3 | Status: SHIPPED | OUTPATIENT
Start: 2022-06-06

## 2022-06-06 RX ORDER — FERROUS SULFATE 325(65) MG
325 TABLET ORAL 2 TIMES DAILY WITH MEALS
Qty: 60 TABLET | Refills: 3 | Status: SHIPPED | OUTPATIENT
Start: 2022-06-06

## 2022-06-06 RX ORDER — IBUPROFEN 800 MG/1
800 TABLET ORAL EVERY 8 HOURS PRN
Qty: 60 TABLET | Refills: 1 | Status: SHIPPED | OUTPATIENT
Start: 2022-06-06

## 2022-06-06 RX ORDER — MODIFIED LANOLIN
1 OINTMENT (GRAM) TOPICAL PRN
Qty: 1 EACH | Refills: 3 | Status: SHIPPED | OUTPATIENT
Start: 2022-06-06

## 2022-06-06 RX ADMIN — FERROUS SULFATE TAB 325 MG (65 MG ELEMENTAL FE) 325 MG: 325 (65 FE) TAB at 09:38

## 2022-06-06 RX ADMIN — ACETAMINOPHEN 650 MG: 325 TABLET ORAL at 09:38

## 2022-06-06 RX ADMIN — DOCUSATE SODIUM 100 MG: 100 CAPSULE, LIQUID FILLED ORAL at 09:38

## 2022-06-06 RX ADMIN — IBUPROFEN 800 MG: 800 TABLET, FILM COATED ORAL at 04:04

## 2022-06-06 ASSESSMENT — PAIN SCALES - GENERAL
PAINLEVEL_OUTOF10: 3
PAINLEVEL_OUTOF10: 9

## 2022-06-06 NOTE — PLAN OF CARE
Problem: Vaginal Birth or  Section  Goal: Fetal and maternal status remain reassuring during the birth process  Description:  Birth OB-Pregnancy care plan goal which identifies if the fetal and maternal status remain reassuring during the birth process  2022 2341 by China Mccain RN  Outcome: Progressing     Problem: Pain  Goal: Verbalizes/displays adequate comfort level or baseline comfort level  Outcome: Progressing     Problem: Safety - Adult  Goal: Free from fall injury  Outcome: Progressing

## 2022-06-06 NOTE — PROGRESS NOTES
CLINICAL PHARMACY NOTE: MEDS TO BEDS    Total # of Prescriptions Filled: 3   The following medications were delivered to the patient:  · ibu 800  · dok 100  · Ferrous sulfate 325    Additional Documentation:

## 2022-06-06 NOTE — FLOWSHEET NOTE
Patient requesting discharge papers. Instructions given by night turn RN. Patient denies any questions or needs for further teaching. Will follow up in 6 weeks.

## 2022-06-06 NOTE — CARE COORDINATION
SW Discharge Planning   Adoption    SW was previously made aware of adoption through [employer] [facility]. Concerns were made prior to delivery, when adoptive mother called the mother baby unit to see if she could bring in her own formula. Adoptive mother spoke with CNP Bobbetta Koyanagi, who alerted this SW about their conversation. It was reported that adoptive mother was hesitant in telling Carrillo Rankin what formula she wanted to use, and she finally reported she wanted to feed the baby goats milk. It was explained at that time why goats milk would be inappropriate for a child under 3years old by our medical staff. After this SW was alerted, SW did contact [employer] [facility] to espress concerns and was able to speak with Gume Soares, the director of the adoption agency. Raghavendraia Dara did report to SW that the adoptive family was already educated that goats milk would not be appropriate for baby, and Don Diamond reported that she had told adoptive mother that she was NOT to contact the hospital. Don Diamond stated that she would have another meeting with adoptive family. LIONEL met with Marianna Harada, mother to an unnamed baby boy, and introduced self and role. Geraldo Felton was pleasant and polite. SW educated Geraldo Felton on all [employer], and Geraldo Felton expressed understanding. Geraldo Felton signed a release of information for SW to provide adoption agency with any information regarding the baby, and signed release of  documentation, stating that she would allow the adoption agency to be discharged with the baby. At this time, we are awaiting for the adoption agency to sign temporary custody documentation with Geraldo Felton, before [place]  or baby can be discharged. LIONEL also met briefly with adoptive mother Keyla Malik is her ) Dominga ( [phone]) and introduced self and role. Keyla Carmona was noted to be holding baby, and SW did notice hospital formula bottles in the room.  Keyla Damarissa reported that she resides at [address] [facility] in [address] [address]. Per Teressa Winn, they have chosen [facility] ( Dr. Maico Galeas) for baby's pediatrician. Teressa Winn denied any needs at this time. LIONEL was able to speak with , Gerri Martin, from [employer] [facility] [employer], who reported that she would be at the hospital between 1-2 to have temporary custody papers signed by Fidencio Linder. Due to concerns about adoptive family wanting to feed baby goats milk, SW did complete a [facility] ( [phone]) referral to [employer] in intake. As adoptive couple will NOT have custody of baby until after discharge, temporary custody of baby to the agency can continue unless SW is notified otherwise by [facility] ( [phone])       PLAN    Awaiting adoption agency to present to have temporary custody papers signed.      Electronically signed by MERCY Lara on 6/6/2022 at 11:08 AM

## 2022-06-06 NOTE — CARE COORDINATION
SW Discharge Planning     Reema from [employer] presented to the hospital to have Jayden sign temporary agreement which was placed in baby's chart. SW was also able to discuss concerns regarding adoptive couple using goats milk to feed baby. Liborio Sheikh reported that her agency will have custody of the baby for the next 6 months and will be following inside the adoptive couple's home and baby's appointments to insure that baby is being fed formula and not goats milk.      Electronically signed by MERCY Yanez on 6/6/2022 at 3:44 PM

## 2022-06-06 NOTE — PROGRESS NOTES
Department of Obstetrics and Gynecology  Labor and Delivery  Attending Post Partum Progress Note      SUBJECTIVE:  Patient without complaints  OBJECTIVE:      Vitals:  BP 85/59  Pulse 83  Temp(Src) 97.4 °F (36.3 °C) (Oral)  Resp 18  Ht 5' 4\" (1.626 m)  Wt 160 lb (72.576 kg)  BMI 27.46 kg/m2  Breastfeeding? Yes    CONSTITUTIONAL:  awake, alert, cooperative, no apparent distress, and appears stated age  ABDOMEN:  Fundus firm and 1 below the umbilicus  CHEST/BREASTS:  Breasts symmetrical,soft and non-tender  MUSCULOSKELETAL: No calf tenderness, 1+ pedal edema.      DATA:    RH:  No results found for: ANATITER, C3, C4, RF  Antibody Screen:  No components found for: ABSCINT  Urine Culture Screen:  No components found for: MORGAN  CBC:    Lab Results   Component Value Date    WBC 9.7 06/06/2022    RBC 3.33 06/06/2022    HGB 9.2 06/06/2022    HCT 28.6 06/06/2022    MCV 85.9 06/06/2022    RDW 14.1 06/06/2022     06/06/2022     U/A:  No components found for: Ana Lilia Hennessy, USPGRAV, UPH, UPROTEIN, UGLUCOSE, UKETONE, UBILI, UBLOOD, UNITRITE, UUROBIL, [facility], USQEPI, [place], [facility], [place], [place], [employer]    ASSESSMENT & PLAN:        Assessment: PP#1 doing well    Plan: Patient is for discharge home today    Jason Iraheta MD  10:39 AM

## 2022-06-06 NOTE — DISCHARGE SUMMARY
Obstetrical Discharge Form    Primary OB Clinician: SARTHAK Mejias,  FACOG  Postpartum 1 Day #    Gestational Age at [facility] [place] e of delivery: 42 weeks and 6 days    Date of Delivery: 2022; Time of Delivery: 1107    Delivery Type: spontaneous vaginal delivery    Baby: Liveborn male,     Intrapartum complications: None    Laceration: none    Episiotomy: none,    Feeding method: both breast and bottle - Similac with iron    Rh Immune globulin given: no    Rubella vaccine given: no    Discharge Date: 2022; Discharge Time: 1045    Early Discharge:  YES-Maternal- home visit declined by patient. Condition at time of discharge home is good as well as disposition    Plan:     Follow-up appointment with Dr. Little Roper in 6 weeks.

## 2022-06-07 NOTE — CARE COORDINATION
SW Discharge Planning    SW received a call from Sadi Coleman, director of [address] [place] [address]. Per [place], due to issues that arose with adoptive parents, Monie Mendoza, that she is unable to disclose due to HIPPA, adoptive parents can NOT take baby home. [place] reported that she would be meeting with adoptive parents face to face later today, and requested that hospital staff NOT TELL adoptive parents this information until she has a chance to meet with them face to face and discuss her concerns with them. [place] reported that baby is fine to remain in the room with Breonna Licona and Vann Lesches until she arrives later today to the hospital.       PLAN    DO NOT [place].  Baby will be discharged home to adoption agency AFTER they arrive to the hospital.     Electronically signed by MERCY Sampson on 6/7/2022 at 9:55 AM

## 2022-06-07 NOTE — CARE COORDINATION
SW Discharge Planning     [place] presented to the hospital to meet with adoptive family to inform them of the decision that baby will NOT go home to them. Per Delfina hernandez, baby's biological mother, Higinio Hernandez has chosen to parent baby. Baby will be discharged to [place] from [employer] [facility] and [place] will take baby to Higinio Hernandez. Release of  documentation was signed and placed in infant's chart.      Electronically signed by MERCY Campos on 2022 at 12:33 PM

## 2022-08-03 NOTE — PROCEDURES
"  Physical Therapy Daily Treatment Note     Name: Odell Melendez  Clinic Number: [id number]    Therapy Diagnosis:   Encounter Diagnoses   Name Primary?    Pain in both feet Yes    Weakness      Physician: Jolie Sorensen NP    Visit Date: 8/3/2022  Physician Orders: PT Eval and Treat   Medical Diagnosis from Referral: Flat feet, pes planus of both feet  Evaluation Date: 7/25/2022  Authorization Period Expiration: 12/31/22  Plan of Care Expiration: 09/19/22  Progress Note Due: 08/25/22  Visit # / Visits authorized: 2/20   FOTO: 1/ 3        Time In: 3:06 PM   Time Out: 3:45 PM   Total Billable Time: 39 minutes    Precautions: Standard    Subjective     Pt reports: That he is doing well  He was compliant with home exercise program.  Response to previous treatment: No adverse response   Functional change: Ongoing    Pain: 0/10  Location: bilateral feet      Objective     Max received therapeutic exercises to develop strength, ROM and flexibility for 29 minutes including:  Upright bike 6 min (seat 7)   Standing calf stretch 2 min   Standing lunge stretch 1 min ea 5 sec hold   Shuttle heel raises 3 x 10 3 1/2 bands   Shuttle leg press R/L 3 x 10 ea 3 bands   Seated heel raises 3 x 10 ea 15# KB  Lateral band walk 2x red power loop     Max received the following manual therapy techniques: Joint mobilizations were applied to the:  for  minutes, including:      Max participated in neuromuscular re-education activities to improve: Balance and Kinesthetic Sense for 10 minutes. The following activities were included:  Short foot 2 min   Tandem stance 3 x 30" ea   A SLS 3 x 30" ea     Home Exercises Provided and Patient Education Provided     Education provided:   - HEP review     Written Home Exercises Provided: Patient instructed to cont prior HEP.  Exercises were reviewed and Max was able to demonstrate them prior to the end of the session.  Max demonstrated good  understanding of the education provided.     See EMR under " Delivery of the placenta        The placenta was delivered intact without complications. On examination afterwards the perineum was intact there were no tears or lacerations. The patient tolerated the procedure well. Patient Instructions for exercises provided prior visit.    Assessment     Able to perform SLS with assistance on the left today. Right remains difficult. He will benefit from a continued focus on improving strength of lateral hip and motor control      Max Is progressing well towards his goals.   Pt prognosis is Excellent.     Pt will continue to benefit from skilled outpatient physical therapy to address the deficits listed in the problem list box on initial evaluation, provide pt/family education and to maximize pt's level of independence in the home and community environment.     Pt's spiritual, cultural and educational needs considered and pt agreeable to plan of care and goals.    Anticipated barriers to physical therapy:     Goals:   Short Term Goals: 4 weeks   1) Pt will be I with established HEP   2) Pt will walk for 15 minutes with pain no worse than 5/10   3) Pt will maintain SLS for 15 seconds or longer to demonstrate improved motor control and balance      Long Term Goals: 8 weeks   1) Pt will perform 10 unilateral heel raises to demonstrate improved strength   2) Pt will walk community distances with foot pain no worse than 3/10   3) Pt will maintain SLS for 1 minute with normal sway to demonstrate improved motor control and balance            Plan     Continue progression of balance activities and strengthening    Flaco Muniz, PT

## 2022-08-18 ENCOUNTER — HOSPITAL ENCOUNTER (EMERGENCY)
Age: 27
Discharge: HOME | End: 2022-08-18
Payer: COMMERCIAL

## 2022-08-18 VITALS
TEMPERATURE: 97.8 F | OXYGEN SATURATION: 99 % | HEART RATE: 58 BPM | DIASTOLIC BLOOD PRESSURE: 58 MMHG | RESPIRATION RATE: 16 BRPM | SYSTOLIC BLOOD PRESSURE: 123 MMHG

## 2022-08-18 DIAGNOSIS — J39.2 THROAT IRRITATION: ICD-10-CM

## 2022-08-18 DIAGNOSIS — Z20.2 POSSIBLE EXPOSURE TO STD: Primary | ICD-10-CM

## 2022-08-18 DIAGNOSIS — N89.8 VAGINAL DISCHARGE: ICD-10-CM

## 2022-08-18 LAB
BACTERIA: ABNORMAL /HPF
BILIRUBIN URINE: NEGATIVE
BLOOD, URINE: NEGATIVE
CLARITY: CLEAR
CLUE CELLS: NORMAL
COLOR: YELLOW
EPITHELIAL CELLS, UA: ABNORMAL /HPF
GLUCOSE URINE: NEGATIVE MG/DL
HCG(URINE) PREGNANCY TEST: NEGATIVE
KETONES, URINE: ABNORMAL MG/DL
LEUKOCYTE ESTERASE, URINE: NEGATIVE
NITRITE, URINE: NEGATIVE
PH UA: 6 (ref 5–9)
PROTEIN UA: NEGATIVE MG/DL
RBC UA: ABNORMAL /HPF (ref 0–2)
SOURCE WET PREP: NORMAL
SPECIFIC GRAVITY UA: 1.02 (ref 1–1.03)
TRICHOMONAS PREP: NORMAL
UROBILINOGEN, URINE: 1 E.U./DL
WBC UA: ABNORMAL /HPF (ref 0–5)
YEAST WET PREP: NORMAL

## 2022-08-18 PROCEDURE — 81001 URINALYSIS AUTO W/SCOPE: CPT

## 2022-08-18 PROCEDURE — 87210 SMEAR WET MOUNT SALINE/INK: CPT

## 2022-08-18 PROCEDURE — 6370000000 HC RX 637 (ALT 250 FOR IP): Performed by: NURSE PRACTITIONER

## 2022-08-18 PROCEDURE — 87491 CHLMYD TRACH DNA AMP PROBE: CPT

## 2022-08-18 PROCEDURE — 2580000003 HC RX 258: Performed by: NURSE PRACTITIONER

## 2022-08-18 PROCEDURE — 96372 THER/PROPH/DIAG INJ SC/IM: CPT

## 2022-08-18 PROCEDURE — 87591 N.GONORRHOEAE DNA AMP PROB: CPT

## 2022-08-18 PROCEDURE — 6360000002 HC RX W HCPCS: Performed by: NURSE PRACTITIONER

## 2022-08-18 PROCEDURE — 81025 URINE PREGNANCY TEST: CPT

## 2022-08-18 PROCEDURE — 99284 EMERGENCY DEPT VISIT MOD MDM: CPT

## 2022-08-18 RX ORDER — DOXYCYCLINE HYCLATE 100 MG/1
100 CAPSULE ORAL ONCE
Status: COMPLETED | OUTPATIENT
Start: 2022-08-18 | End: 2022-08-18

## 2022-08-18 RX ORDER — CEFTRIAXONE 1 G/1
500 INJECTION, POWDER, FOR SOLUTION INTRAMUSCULAR; INTRAVENOUS ONCE
Status: COMPLETED | OUTPATIENT
Start: 2022-08-18 | End: 2022-08-18

## 2022-08-18 RX ORDER — DOXYCYCLINE HYCLATE 100 MG
100 TABLET ORAL 2 TIMES DAILY
Qty: 14 TABLET | Refills: 0 | Status: SHIPPED | OUTPATIENT
Start: 2022-08-18 | End: 2022-08-25

## 2022-08-18 RX ADMIN — WATER 1 ML: 1 INJECTION INTRAMUSCULAR; INTRAVENOUS; SUBCUTANEOUS at 22:54

## 2022-08-18 RX ADMIN — DOXYCYCLINE HYCLATE 100 MG: 100 CAPSULE ORAL at 22:55

## 2022-08-18 RX ADMIN — CEFTRIAXONE 500 MG: 1 INJECTION, POWDER, FOR SOLUTION INTRAMUSCULAR; INTRAVENOUS at 22:54

## 2022-08-18 ASSESSMENT — LIFESTYLE VARIABLES: HOW OFTEN DO YOU HAVE A DRINK CONTAINING ALCOHOL: MONTHLY OR LESS

## 2022-08-18 NOTE — ED PROVIDER NOTES
[facility]    Department of Emergency Medicine   ED  Provider Note  Admit Date/RoomTime: 8/18/2022  7:11 PM  ED Room: 02/02 8/18/22  7:51 PM EDT    History of Present Illness:   Keturah Klinefelter is a 24 y.o. female presenting to the ED for multiple complaints. Patient is complaining of vaginal discharge, itching, odor ongoing since July. the complaint has been intermittent, moderate in severity, and worsened by nothing in particular. relieved by nothing. No medications tried prior to arrival.  She states that she saw Dr. Ulus Lennox at the end of July and he advised her that she had bacterial vaginosis and he prescribed her Flagyl. She states that she has not taken the Flagyl yet. She states that she is concerned for STD exposure due to a partner. Patient denies any abnormal vaginal bleeding. Denies any abdominal pain, nausea, vomiting, diarrhea, fevers or chills. Denies any back pain. She is additionally complaining of chronic postnasal drainage that is going on the back of her throat and causing her some throat irritation. She states that she has never seen an ENT before in the past but she would like a referral to do so. She states that at times the drainage dripping down the back of her throat seems to have a foul smell to it. She states in the past she has had thrush and she was concerned for thrush infection again. She denies any significant pharyngitis but describes the sensation as an irritation. Patient denies any other complaints. She has no cough, congestion, ear pain, hemoptysis, chest pain, shortness of breath. She reports eating and drinking normally. She denies any dysuria, urinary urgency, urinary frequency, hematuria, urinary incontinence, urinary retention. Reports normal bowel movements. Patient has no other reported complaints currently.       Review of Systems:   A complete review of systems was performed and pertinent positives and negatives are stated within HPI, all other Negative Negative    Leukocyte Esterase, Urine Negative Negative    WBC, UA 0-1 0 - 5 /HPF    RBC, UA NONE 0 - 2 /HPF    Epithelial Cells, UA FEW /HPF    Bacteria, UA RARE (A) None Seen /HPF       RADIOLOGY:  Interpreted by Radiologist.  No orders to display       ------------------------- NURSING NOTES AND VITALS REVIEWED ---------------------------   The nursing notes within the ED encounter and vital signs as below have been reviewed. BP (!) 123/58   Pulse 58   Temp 97.8 °F (36.6 °C) (Temporal)   Resp 16   SpO2 99%   Oxygen Saturation Interpretation: Normal      ---------------------------------------------------PHYSICAL EXAM--------------------------------------    Constitutional/General: Alert and oriented x3, well appearing, non toxic in NAD, pleasant  Head: Normocephalic and atraumatic  Eyes: PERRL, EOMI, conjunctiva normal, sclera non icteric, no eye drainage  Mouth: Oropharynx clear, without erythema, handling secretions, no trismus, no asymmetry of the posterior oropharynx or uvular edema. No tongue/lip swelling. Tongue normal.  No evidence of thrush. Neck: Supple, full ROM, non tender to palpation in the midline, no stridor, no crepitus, no meningeal signs. No lymphadenopathy. Respiratory: Lungs clear to auscultation bilaterally, no wheezes, rales, or rhonchi. Not in respiratory distress. Respirations easy and unlabored. Cardiovascular:  S1S2. Regular rate. Regular rhythm. No murmurs, gallops, or rubs. 2+ distal pulses  Chest: No chest wall tenderness  GI:  Abdomen Soft, Non tender, Non distended. +BS x 4 quadrants. No organomegaly, no palpable masses,  No rebound, guarding, or rigidity. No suprapubic tenderness with palpation. No CVA tenderness. Negative Frazier sign. No tenderness at McBurney's point. : External genitalia normal without swelling, erythema, or lesions. White, thick vaginal discharge in the vaginal vault. Cervix appears normal.  No cervical motion tenderness.   No for further evaluation and management. Counseling: The emergency provider has spoken with the patient and discussed todays results, in addition to providing specific details for the plan of care and counseling regarding the diagnosis and prognosis. Questions are answered at this time and they are agreeable with the plan.      --------------------------------- IMPRESSION AND DISPOSITION ---------------------------------    IMPRESSION  1. Possible exposure to STD    2. Vaginal discharge    3.  Throat irritation        DISPOSITION  Disposition: Discharge to home  Patient condition is stable              BARBY Garibay CNP  08/19/22 1950

## 2022-08-19 ENCOUNTER — TELEPHONE (OUTPATIENT)
Dept: ENT CLINIC | Age: 27
End: 2022-08-19

## 2022-08-19 NOTE — TELEPHONE ENCOUNTER
ED 8/18/22 Throat irritation, on antibiotics from ED, Ears itching, sinus drainage, swollen lymph nodes.  Please advise patient for follow up recommendations [phone]

## 2022-08-22 LAB
C TRACH DNA GENITAL QL NAA+PROBE: NEGATIVE
N. GONORRHOEAE DNA: NEGATIVE
SOURCE: NORMAL

## 2022-11-14 ENCOUNTER — OFFICE VISIT (OUTPATIENT)
Dept: PRIMARY CARE CLINIC | Age: 27
End: 2022-11-14
Payer: COMMERCIAL

## 2022-11-14 VITALS
TEMPERATURE: 98.4 F | HEIGHT: 64 IN | WEIGHT: 144 LBS | SYSTOLIC BLOOD PRESSURE: 106 MMHG | HEART RATE: 107 BPM | DIASTOLIC BLOOD PRESSURE: 80 MMHG | RESPIRATION RATE: 16 BRPM | BODY MASS INDEX: 24.59 KG/M2 | OXYGEN SATURATION: 100 %

## 2022-11-14 DIAGNOSIS — J32.9 BACTERIAL SINUSITIS: Primary | ICD-10-CM

## 2022-11-14 DIAGNOSIS — B96.89 BACTERIAL SINUSITIS: Primary | ICD-10-CM

## 2022-11-14 PROCEDURE — 1036F TOBACCO NON-USER: CPT | Performed by: NURSE PRACTITIONER

## 2022-11-14 PROCEDURE — G8420 CALC BMI NORM PARAMETERS: HCPCS | Performed by: NURSE PRACTITIONER

## 2022-11-14 PROCEDURE — G8484 FLU IMMUNIZE NO ADMIN: HCPCS | Performed by: NURSE PRACTITIONER

## 2022-11-14 PROCEDURE — G8427 DOCREV CUR MEDS BY ELIG CLIN: HCPCS | Performed by: NURSE PRACTITIONER

## 2022-11-14 PROCEDURE — 99213 OFFICE O/P EST LOW 20 MIN: CPT | Performed by: NURSE PRACTITIONER

## 2022-11-14 RX ORDER — AMOXICILLIN AND CLAVULANATE POTASSIUM 875; 125 MG/1; MG/1
1 TABLET, FILM COATED ORAL 2 TIMES DAILY
Qty: 20 TABLET | Refills: 0 | Status: ON HOLD
Start: 2022-11-14 | End: 2022-11-23

## 2022-11-14 RX ORDER — PREDNISONE 10 MG/1
10 TABLET ORAL 2 TIMES DAILY
Qty: 10 TABLET | Refills: 0 | Status: SHIPPED | OUTPATIENT
Start: 2022-11-14 | End: 2022-11-19

## 2022-11-14 ASSESSMENT — PATIENT HEALTH QUESTIONNAIRE - PHQ9
SUM OF ALL RESPONSES TO PHQ QUESTIONS 1-9: 0
2. FEELING DOWN, DEPRESSED OR HOPELESS: 0
SUM OF ALL RESPONSES TO PHQ QUESTIONS 1-9: 0
SUM OF ALL RESPONSES TO PHQ9 QUESTIONS 1 & 2: 0
SUM OF ALL RESPONSES TO PHQ QUESTIONS 1-9: 0
1. LITTLE INTEREST OR PLEASURE IN DOING THINGS: 0
SUM OF ALL RESPONSES TO PHQ QUESTIONS 1-9: 0

## 2022-11-14 NOTE — PROGRESS NOTES
Chief Complaint:   Sinus Problem (Pt had appt with) and Pharyngitis (Pt is also having issue with bleeding tonsils )      History of Present Illness   Source of history provided by:  patient. Ra Cotton is a 24 y.o. old female with a past medical history of:   Past Medical History:   Diagnosis Date    Abnormal Pap smear of cervix     in 2021 - precancerous cells    Allergic     seasonal    Emotional disorder     Rh sensitized     Traumatic injury during pregnancy, third trimester         Pt presents to the [facility] care with nasal congestion/sinus pressure/sore throat  that has been a chronic issue since she was a child. Pt had appointment this morning w/ENT but not seen. No fever noted. Denies any N/V/D, abdominal pain, CP, progressive SOB, dizziness, or lethargy. ROS    Unless otherwise stated in this report or unable to obtain because of the patient's clinical or mental status as evidenced by the medical record, this patients's positive and negative responses for Review of Systems, constitutional, psych, eyes, ENT, cardiovascular, respiratory, gastrointestinal, neurological, genitourinary, musculoskeletal, integument systems and systems related to the presenting problem are either stated in the preceding or were not pertinent or were negative for the symptoms and/or complaints related to the medical problem. Past Surgical History:  has no past surgical history on file. Social History:  reports that she quit smoking about 7 years ago. Her smoking use included cigarettes. She has never used smokeless tobacco. She reports that she does not drink alcohol and does not use drugs. Family History: family history includes Hypertension in her father and mother. Allergies: Patient has no known allergies.     Physical Exam         VS:  /80   Pulse (!) 107   Temp 98.4 °F (36.9 °C)   Resp 16   Ht 5' 4\" (1.626 m)   Wt 144 lb (65.3 kg)   LMP  (LMP Unknown)   SpO2 100%   Breastfeeding Unknown BMI 24.72 kg/m²    Oxygen Saturation Interpretation: Normal.    Constitutional:  Alert, development consistent with age. Ears:  External Ears: Normal bilateral pinna. TM's & External Canals: TM's normal bilaterally without perforation. Canals without erythema or drainage. Nose:   There is no obvious septal defect. Diffuse redness/edema  Mouth:  Moist bucca mucosa and normal tongue. Throat: Mild posterior pharyngeal erythema without exudates or lesions. Neck:  Supple. There is no obvious adenopathy or neck tenderness. Lungs:   Breath sounds: Normal chest expansion and breath sounds noted throughout. No wheezes, rales, or rhonchi noted. Heart:  Regular rate and rhythm, normal heart sounds, without pathological murmurs, ectopy, gallops, or rubs. Skin:  Normal turgor. Warm, dry, without visible rash. Neurological:  Oriented. Motor functions intact. Lab / Imaging Results   (All laboratory and radiology results have been personally reviewed by myself)  Labs:  No results found for this visit on 11/14/22. Imaging: All Radiology results interpreted by Radiologist unless otherwise noted. No orders to display         Assessment / Plan     Impression(s):  1. Bacterial sinusitis      Disposition:  Disposition: Start Augmentin and Bromfed as ordered, stay well hydrated, schedule f/u appointment w/ENT-see HPI.

## 2022-11-22 ENCOUNTER — HOSPITAL ENCOUNTER (INPATIENT)
Age: 27
LOS: 5 days | Discharge: HOME | DRG: 753 | End: 2022-11-28
Attending: EMERGENCY MEDICINE | Admitting: PSYCHIATRY & NEUROLOGY
Payer: COMMERCIAL

## 2022-11-22 DIAGNOSIS — F29 PSYCHOSIS, UNSPECIFIED PSYCHOSIS TYPE (HCC): Primary | ICD-10-CM

## 2022-11-22 LAB
ACETAMINOPHEN LEVEL: <5 MCG/ML (ref 10–30)
ALBUMIN SERPL-MCNC: 4.3 G/DL (ref 3.5–5.2)
ALP BLD-CCNC: 108 U/L (ref 35–104)
ALT SERPL-CCNC: 15 U/L (ref 0–32)
AMPHETAMINE SCREEN, URINE: NOT DETECTED
ANION GAP SERPL CALCULATED.3IONS-SCNC: 16 MMOL/L (ref 7–16)
AST SERPL-CCNC: 16 U/L (ref 0–31)
BARBITURATE SCREEN URINE: NOT DETECTED
BASOPHILS ABSOLUTE: 0.03 E9/L (ref 0–0.2)
BASOPHILS RELATIVE PERCENT: 0.4 % (ref 0–2)
BENZODIAZEPINE SCREEN, URINE: NOT DETECTED
BILIRUB SERPL-MCNC: 0.2 MG/DL (ref 0–1.2)
BUN BLDV-MCNC: 9 MG/DL (ref 6–20)
CALCIUM SERPL-MCNC: 9.8 MG/DL (ref 8.6–10.2)
CANNABINOID SCREEN URINE: NOT DETECTED
CHLORIDE BLD-SCNC: 101 MMOL/L (ref 98–107)
CO2: 18 MMOL/L (ref 22–29)
COCAINE METABOLITE SCREEN URINE: NOT DETECTED
CREAT SERPL-MCNC: 0.6 MG/DL (ref 0.5–1)
EOSINOPHILS ABSOLUTE: 0.02 E9/L (ref 0.05–0.5)
EOSINOPHILS RELATIVE PERCENT: 0.2 % (ref 0–6)
ETHANOL: <10 MG/DL (ref 0–0.08)
FENTANYL SCREEN, URINE: NOT DETECTED
GFR SERPL CREATININE-BSD FRML MDRD: >60 ML/MIN/1.73
GLUCOSE BLD-MCNC: 141 MG/DL (ref 74–99)
HCG(URINE) PREGNANCY TEST: NEGATIVE
HCT VFR BLD CALC: 36.2 % (ref 34–48)
HEMOGLOBIN: 11.9 G/DL (ref 11.5–15.5)
IMMATURE GRANULOCYTES #: 0.02 E9/L
IMMATURE GRANULOCYTES %: 0.2 % (ref 0–5)
INFLUENZA A: NOT DETECTED
INFLUENZA B: NOT DETECTED
LYMPHOCYTES ABSOLUTE: 2.77 E9/L (ref 1.5–4)
LYMPHOCYTES RELATIVE PERCENT: 34.1 % (ref 20–42)
Lab: NORMAL
MCH RBC QN AUTO: 29.2 PG (ref 26–35)
MCHC RBC AUTO-ENTMCNC: 32.9 % (ref 32–34.5)
MCV RBC AUTO: 88.7 FL (ref 80–99.9)
METHADONE SCREEN, URINE: NOT DETECTED
MONOCYTES ABSOLUTE: 0.5 E9/L (ref 0.1–0.95)
MONOCYTES RELATIVE PERCENT: 6.2 % (ref 2–12)
NEUTROPHILS ABSOLUTE: 4.79 E9/L (ref 1.8–7.3)
NEUTROPHILS RELATIVE PERCENT: 58.9 % (ref 43–80)
OPIATE SCREEN URINE: NOT DETECTED
OXYCODONE URINE: NOT DETECTED
PDW BLD-RTO: 12.5 FL (ref 11.5–15)
PHENCYCLIDINE SCREEN URINE: NOT DETECTED
PLATELET # BLD: 319 E9/L (ref 130–450)
PMV BLD AUTO: 11.2 FL (ref 7–12)
POTASSIUM SERPL-SCNC: 3.2 MMOL/L (ref 3.5–5)
RBC # BLD: 4.08 E12/L (ref 3.5–5.5)
SALICYLATE, SERUM: <0.3 MG/DL (ref 0–30)
SARS-COV-2 RNA, RT PCR: NOT DETECTED
SODIUM BLD-SCNC: 135 MMOL/L (ref 132–146)
TOTAL PROTEIN: 8.4 G/DL (ref 6.4–8.3)
TRICYCLIC ANTIDEPRESSANTS SCREEN SERUM: NEGATIVE NG/ML
TROPONIN, HIGH SENSITIVITY: <6 NG/L (ref 0–9)
WBC # BLD: 8.1 E9/L (ref 4.5–11.5)

## 2022-11-22 PROCEDURE — 80179 DRUG ASSAY SALICYLATE: CPT

## 2022-11-22 PROCEDURE — 84484 ASSAY OF TROPONIN QUANT: CPT

## 2022-11-22 PROCEDURE — 36415 COLL VENOUS BLD VENIPUNCTURE: CPT

## 2022-11-22 PROCEDURE — 87636 SARSCOV2 & INF A&B AMP PRB: CPT

## 2022-11-22 PROCEDURE — 82077 ASSAY SPEC XCP UR&BREATH IA: CPT

## 2022-11-22 PROCEDURE — 93005 ELECTROCARDIOGRAM TRACING: CPT | Performed by: EMERGENCY MEDICINE

## 2022-11-22 PROCEDURE — 80143 DRUG ASSAY ACETAMINOPHEN: CPT

## 2022-11-22 PROCEDURE — 80307 DRUG TEST PRSMV CHEM ANLYZR: CPT

## 2022-11-22 PROCEDURE — 81025 URINE PREGNANCY TEST: CPT

## 2022-11-22 PROCEDURE — 80053 COMPREHEN METABOLIC PANEL: CPT

## 2022-11-22 PROCEDURE — 99285 EMERGENCY DEPT VISIT HI MDM: CPT

## 2022-11-22 PROCEDURE — 85025 COMPLETE CBC W/AUTO DIFF WBC: CPT

## 2022-11-22 ASSESSMENT — PAIN - FUNCTIONAL ASSESSMENT: PAIN_FUNCTIONAL_ASSESSMENT: NONE - DENIES PAIN

## 2022-11-22 ASSESSMENT — LIFESTYLE VARIABLES: HOW OFTEN DO YOU HAVE A DRINK CONTAINING ALCOHOL: NEVER

## 2022-11-22 NOTE — LETTER
[address]  [address] [facility] [address]  Phone: [phone]          November 28, 2022     Patient: Amol Torres   YOB: 1995   Date of Visit: 11/22/2022       To Whom It May Concern:    Amol Torres was admitted to [place] [address] 11/22/22 and discharged 11/28/22.      Sincerely,        Sri Hardy., MSW, LSW

## 2022-11-23 ENCOUNTER — APPOINTMENT (OUTPATIENT)
Dept: CT IMAGING | Age: 27
DRG: 753 | End: 2022-11-23
Payer: COMMERCIAL

## 2022-11-23 PROBLEM — F23 ACUTE PSYCHOSIS (HCC): Status: ACTIVE | Noted: 2022-11-23

## 2022-11-23 PROBLEM — F31.9 BIPOLAR 1 DISORDER (HCC): Status: ACTIVE | Noted: 2022-11-23

## 2022-11-23 PROBLEM — F31.9 BIPOLAR 1 DISORDER (HCC): Status: RESOLVED | Noted: 2022-11-23 | Resolved: 2022-11-23

## 2022-11-23 LAB
EKG ATRIAL RATE: 123 BPM
EKG P AXIS: 78 DEGREES
EKG P-R INTERVAL: 182 MS
EKG Q-T INTERVAL: 272 MS
EKG QRS DURATION: 76 MS
EKG QTC CALCULATION (BAZETT): 389 MS
EKG R AXIS: 72 DEGREES
EKG T AXIS: -84 DEGREES
EKG VENTRICULAR RATE: 123 BPM

## 2022-11-23 PROCEDURE — 6370000000 HC RX 637 (ALT 250 FOR IP): Performed by: NURSE PRACTITIONER

## 2022-11-23 PROCEDURE — 96372 THER/PROPH/DIAG INJ SC/IM: CPT

## 2022-11-23 PROCEDURE — 93010 ELECTROCARDIOGRAM REPORT: CPT | Performed by: INTERNAL MEDICINE

## 2022-11-23 PROCEDURE — 6360000002 HC RX W HCPCS: Performed by: EMERGENCY MEDICINE

## 2022-11-23 PROCEDURE — 2580000003 HC RX 258

## 2022-11-23 PROCEDURE — 6360000002 HC RX W HCPCS

## 2022-11-23 PROCEDURE — 70450 CT HEAD/BRAIN W/O DYE: CPT

## 2022-11-23 PROCEDURE — 1240000000 HC EMOTIONAL WELLNESS R&B

## 2022-11-23 RX ORDER — OLANZAPINE 5 MG/1
5 TABLET ORAL NIGHTLY
Status: DISCONTINUED | OUTPATIENT
Start: 2022-11-23 | End: 2022-11-25

## 2022-11-23 RX ORDER — MAGNESIUM HYDROXIDE/ALUMINUM HYDROXICE/SIMETHICONE 120; 1200; 1200 MG/30ML; MG/30ML; MG/30ML
30 SUSPENSION ORAL PRN
Status: DISCONTINUED | OUTPATIENT
Start: 2022-11-23 | End: 2022-11-28 | Stop reason: HOSPADM

## 2022-11-23 RX ORDER — DIPHENHYDRAMINE HYDROCHLORIDE 50 MG/ML
50 INJECTION INTRAMUSCULAR; INTRAVENOUS ONCE
Status: COMPLETED | OUTPATIENT
Start: 2022-11-23 | End: 2022-11-23

## 2022-11-23 RX ORDER — WATER 1000 ML/1000ML
INJECTION, SOLUTION INTRAVENOUS
Status: COMPLETED
Start: 2022-11-23 | End: 2022-11-23

## 2022-11-23 RX ORDER — ACETAMINOPHEN 325 MG/1
650 TABLET ORAL EVERY 6 HOURS PRN
Status: DISCONTINUED | OUTPATIENT
Start: 2022-11-23 | End: 2022-11-28 | Stop reason: HOSPADM

## 2022-11-23 RX ORDER — HALOPERIDOL 5 MG/1
5 TABLET ORAL EVERY 6 HOURS PRN
Status: DISCONTINUED | OUTPATIENT
Start: 2022-11-23 | End: 2022-11-28 | Stop reason: HOSPADM

## 2022-11-23 RX ORDER — HALOPERIDOL 5 MG/ML
5 INJECTION INTRAMUSCULAR EVERY 6 HOURS PRN
Status: DISCONTINUED | OUTPATIENT
Start: 2022-11-23 | End: 2022-11-28 | Stop reason: HOSPADM

## 2022-11-23 RX ORDER — LORAZEPAM 2 MG/ML
2 INJECTION INTRAMUSCULAR ONCE
Status: COMPLETED | OUTPATIENT
Start: 2022-11-23 | End: 2022-11-23

## 2022-11-23 RX ORDER — ZIPRASIDONE MESYLATE 20 MG/ML
INJECTION, POWDER, LYOPHILIZED, FOR SOLUTION INTRAMUSCULAR
Status: COMPLETED
Start: 2022-11-23 | End: 2022-11-23

## 2022-11-23 RX ORDER — WATER 1000 ML/1000ML
INJECTION, SOLUTION INTRAVENOUS
Status: DISCONTINUED
Start: 2022-11-23 | End: 2022-11-23 | Stop reason: WASHOUT

## 2022-11-23 RX ORDER — ZIPRASIDONE MESYLATE 20 MG/ML
20 INJECTION, POWDER, LYOPHILIZED, FOR SOLUTION INTRAMUSCULAR ONCE
Status: COMPLETED | OUTPATIENT
Start: 2022-11-23 | End: 2022-11-23

## 2022-11-23 RX ORDER — NICOTINE 21 MG/24HR
1 PATCH, TRANSDERMAL 24 HOURS TRANSDERMAL DAILY
Status: DISCONTINUED | OUTPATIENT
Start: 2022-11-23 | End: 2022-11-23

## 2022-11-23 RX ORDER — LANOLIN ALCOHOL/MO/W.PET/CERES
3 CREAM (GRAM) TOPICAL NIGHTLY
Status: DISCONTINUED | OUTPATIENT
Start: 2022-11-23 | End: 2022-11-28 | Stop reason: HOSPADM

## 2022-11-23 RX ORDER — HYDROXYZINE PAMOATE 50 MG/1
50 CAPSULE ORAL 3 TIMES DAILY PRN
Status: DISCONTINUED | OUTPATIENT
Start: 2022-11-23 | End: 2022-11-28 | Stop reason: HOSPADM

## 2022-11-23 RX ORDER — POTASSIUM CHLORIDE 20 MEQ/1
40 TABLET, EXTENDED RELEASE ORAL ONCE
Status: COMPLETED | OUTPATIENT
Start: 2022-11-23 | End: 2022-11-23

## 2022-11-23 RX ADMIN — ZIPRASIDONE MESYLATE 20 MG: 20 INJECTION, POWDER, LYOPHILIZED, FOR SOLUTION INTRAMUSCULAR at 00:16

## 2022-11-23 RX ADMIN — POTASSIUM CHLORIDE 40 MEQ: 1500 TABLET, EXTENDED RELEASE ORAL at 14:40

## 2022-11-23 RX ADMIN — DIPHENHYDRAMINE HYDROCHLORIDE 50 MG: 50 INJECTION, SOLUTION INTRAMUSCULAR; INTRAVENOUS at 00:40

## 2022-11-23 RX ADMIN — MELATONIN 3 MG ORAL TABLET 3 MG: 3 TABLET ORAL at 20:33

## 2022-11-23 RX ADMIN — LORAZEPAM 2 MG: 2 INJECTION INTRAMUSCULAR; INTRAVENOUS at 00:40

## 2022-11-23 RX ADMIN — WATER: 1 INJECTION, SOLUTION INTRAMUSCULAR; INTRAVENOUS; SUBCUTANEOUS at 00:16

## 2022-11-23 RX ADMIN — OLANZAPINE 5 MG: 5 TABLET, FILM COATED ORAL at 20:34

## 2022-11-23 ASSESSMENT — LIFESTYLE VARIABLES
HOW OFTEN DO YOU HAVE A DRINK CONTAINING ALCOHOL: NEVER
HOW OFTEN DO YOU HAVE A DRINK CONTAINING ALCOHOL: NEVER
HOW MANY STANDARD DRINKS CONTAINING ALCOHOL DO YOU HAVE ON A TYPICAL DAY: PATIENT DOES NOT DRINK

## 2022-11-23 ASSESSMENT — PATIENT HEALTH QUESTIONNAIRE - PHQ9
SUM OF ALL RESPONSES TO PHQ QUESTIONS 1-9: 10
SUM OF ALL RESPONSES TO PHQ QUESTIONS 1-9: 6

## 2022-11-23 ASSESSMENT — SLEEP AND FATIGUE QUESTIONNAIRES
DO YOU HAVE DIFFICULTY SLEEPING: NO
DO YOU USE A SLEEP AID: NO
AVERAGE NUMBER OF SLEEP HOURS: 6
AVERAGE NUMBER OF SLEEP HOURS: 6
DO YOU HAVE DIFFICULTY SLEEPING: NO
DO YOU USE A SLEEP AID: NO

## 2022-11-23 NOTE — ED NOTES
Assumed care of pt at this time; patient tearful, disoriented, uncooperative, and in 4 point restraints; pink slip and chart placed in slot at this time.      Vern Ayala RN  11/23/22 [id number]

## 2022-11-23 NOTE — ED NOTES
Pt admitted to Dr FLORENCE [facility] service per [facility] AN [facility]FFILIATE OF [facility] nurse Haider Mckee in admitting notified of assigned bed 7530, Silvia on [address] aware of pending admission.       [address], [facility], [place]  11/23/22 2272

## 2022-11-23 NOTE — ED NOTES
Patient was boarding in the emergency department. Patient was pink slipped for acute psychosis. While awaiting psychiatric evaluation patient became agitated aggressive and fled the emergency department. Patient was brought back by police. Patient was given 20 mg of Geodon. Despite this patient continued to attempt to flee the emergency department became a flight risk aggressive to staff therefore patient was placed in four-point restraints and received Benadryl and Ativan. Patient was reevaluated 0114, sleeping, stable vital signs. Hard restraints discontinued at this time.      Beau Closs, DO  11/23/22 0122

## 2022-11-23 NOTE — PROGRESS NOTES
[facility][1]  Admission Note     Admission Type:        Reason for admission:         Addictive Behavior:   Addictive Behavior  In the Past 3 Months, Have You Felt or Has Someone Told You That You Have a Problem With  : None    Medical Problems:   Past Medical History:   Diagnosis Date    Abnormal Pap smear of cervix     in 2021 - precancerous cells    Allergic     seasonal    Bipolar 1 disorder ([facility] [address] 11/23/2022    Emotional disorder     Rh sensitized     Traumatic injury during pregnancy, third trimester        Status EXAM:  Mental Status and Behavioral Exam  Normal: Yes  Level of Assistance: Independent/Self  Facial Expression: Flat, Sad  Affect: Congruent  Level of Consciousness: Alert  Frequency of Checks: 4 times per hour, close  Mood:Normal: No  Mood: Depressed, Sad  Motor Activity:Normal: Yes  Eye Contact: Good  Observed Behavior: Cooperative  Sexual Misconduct History: Current - no  Preception: Yale to person, Yale to time, [place] to place  Attention:Normal: Yes  Thought Processes: Circumstantial  Thought Content:Normal: No  Thought Content: Preoccupations  Depression Symptoms: Crying, Feelings of helplessness  Anxiety Symptoms: Generalized, Panic attack  Jessica Symptoms: No problems reported or observed. Hallucinations: None  Delusions: No  Memory:Normal: Yes  Insight and Judgment: No  Insight and Judgment: Poor judgment    Tobacco Screening:  Practical Counseling, on admission, bhumika X, if applicable and completed (first 3 are required if patient doesn't refuse):             ( x) Recognizing danger situations (included triggers and roadblocks)                    ( x) Coping skills (new ways to manage stress,relaxation techniques, changing routine, distraction)                                                           (x ) Basic information about quitting (benefits of quitting, techniques in how to quit, available resources  ( ) Referral for counseling faxed to [facility] ( ) Patient refused counseling  ( x) Patient has not smoked in the last 30 days    Metabolic Screening:    No results found for: LABA1C    Lab Results   Component Value Date    CHOL 124 12/13/2011     Lab Results   Component Value Date    TRIG 45 12/13/2011     Lab Results   Component Value Date    HDL 35.0 (A) 12/13/2011     No components found for: LDLCAL  No results found for: LABVLDL      Body mass index is 25.51 kg/m². BP Readings from Last 2 Encounters:   11/23/22 117/79   11/14/22 106/80           Pt admitted with followings belongings:  Clothing:  Footwear, Jacket/Coat  Other Valuables: Purse (patient has diapers, and brush in purse.)    Irasema Romeo RN

## 2022-11-23 NOTE — ED NOTES
Referred to [facility] AN [facility]FFILIATE OF [facility] nurse Itzel to be referred with on-call re: possible admission.      [address], [place], [place]  11/23/22 8336

## 2022-11-23 NOTE — CARE COORDINATION
Sw attempted to assess pt. Pt sleeping and not responding to sw verbal cues after multiple attempts. Sw will continue to try and complete assessment.

## 2022-11-23 NOTE — ED NOTES
Patient has 2 belongings bags in [facility] AN AFFILIATE OF [place] [id number].       Billy Adams RN  11/23/22 0020

## 2022-11-23 NOTE — ED NOTES
Attempted to call report, unable to accept report, will call back.      Isa Dawkins, RN  11/23/22 7431

## 2022-11-23 NOTE — ED NOTES
Patient resting in bed eyes closed snoring at this time; hard 4 point restraints removed     Bebeto Palma RN  11/23/22 1411

## 2022-11-23 NOTE — ED NOTES
At this time the patient brought back [facility] and is back in the bed sitter remains at the bedside      Clark Mancuso RN  11/23/22 0007
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
## 2022-11-23 NOTE — ED NOTES
Patient eloped from department. Charge nurse notified. [facility] PD notified and out looking for patient.      Billy Adams RN  11/23/22 0001
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
## 2022-11-23 NOTE — ED NOTES
[facility] found patient and escorted patient back to assigned bed.      Dinorah Turcios RN  11/23/22 5717

## 2022-11-23 NOTE — ED PROVIDER NOTES
HPI:  11/22/22, Time: 8:12 PM RACHELLE Carrasco is a 24 y.o. female presenting to the ED for psychiatric evaluation. Patient was at her therapist office. She voiced that her children are possessed. She is difficult to redirect, she states that she is taking her baby's daddy to court. She says she was seen earlier department and discharged. Chart review shows she was not here. She intermittently says nonsensical things. She denies suicidal homicidal ideation otherwise. Denies any other symptoms or complaints. Review of Systems:   A complete review of systems was performed and pertinent positives and negatives are stated within HPI, all other systems reviewed and are negative.          --------------------------------------------- PAST HISTORY ---------------------------------------------  Past Medical History:  has a past medical history of Abnormal Pap smear of cervix, Allergic, Emotional disorder, Rh sensitized, and Traumatic injury during pregnancy, third trimester. Past Surgical History:  has no past surgical history on file. Social History:  reports that she quit smoking about 7 years ago. Her smoking use included cigarettes. She has never used smokeless tobacco. She reports that she does not drink alcohol and does not use drugs. Family History: family history includes Hypertension in her father and mother. The patients home medications have been reviewed. Allergies: Patient has no known allergies. -------------------------------------------------- RESULTS -------------------------------------------------  All laboratory and radiology results have been personally reviewed by myself   LABS:  No results found for this visit on 11/22/22.     RADIOLOGY:  Interpreted by Radiologist.  No orders to display       ------------------------- NURSING NOTES AND VITALS REVIEWED ---------------------------   The nursing notes within the ED encounter and vital signs as below have been reviewed. BP (!) 153/91   Pulse (!) 112   Temp 97.8 °F (36.6 °C)   Resp 16   Ht 5' 3\" (1.6 m)   Wt 144 lb (65.3 kg)   LMP 10/23/2022 (Approximate) Comment: Pt. states she is due any day to start her peroid  SpO2 100%   Breastfeeding No   BMI 25.51 kg/m²   Oxygen Saturation Interpretation: Normal      ---------------------------------------------------PHYSICAL EXAM--------------------------------------      Constitutional/General: Alert and oriented x3, well appearing, non toxic in NAD  Head: Normocephalic and atraumatic  Eyes: PERRL, EOMI  Mouth: Oropharynx clear, handling secretions, no trismus  Neck: Supple, full ROM,   Pulmonary: Lungs clear to auscultation bilaterally, no wheezes, rales, or rhonchi. Not in respiratory distress  Cardiovascular:  Regular rate and rhythm, no murmurs, gallops, or rubs. 2+ distal pulses  Abdomen: Soft, non tender, non distended,   Extremities: Moves all extremities x 4. Warm and well perfused  Skin: warm and dry without rash  Neurologic: GCS 15,  Psych: Normal Affect      ------------------------------ ED COURSE/MEDICAL DECISION MAKING----------------------  Medications - No data to display    EKG: Sinus rhythm, rate 123, no STEMI, normal QT and QTc    ED COURSE:       Medical Decision Making:    Patient is medically cleared    Counseling: The emergency provider has spoken with the patient and discussed todays results, in addition to providing specific details for the plan of care and counseling regarding the diagnosis and prognosis. Questions are answered at this time and they are agreeable with the plan.      --------------------------------- IMPRESSION AND DISPOSITION ---------------------------------    IMPRESSION  1. Psychosis, unspecified psychosis type ([facility]        DISPOSITION  Disposition: Admit to mental health unit - medically cleared for admission  Patient condition is stable      NOTE: This report was transcribed using voice recognition software.  Every effort was made to ensure accuracy; however, inadvertent computerized transcription errors may be present       Saad Gutierrez MD  11/23/22 1946

## 2022-11-23 NOTE — ED NOTES
Returned from [id number] [facility],[address], [address] [facility]  11/23/22 6283

## 2022-11-23 NOTE — ED NOTES
DENISSE SW attempted to assess patient, patient was sleeping, appears to still be under the effects of medication that was given earlier. Patient will be assessed when she is awake and alert.      Johanna Lopez, MSW, LSW  11/23/22 0595

## 2022-11-23 NOTE — PLAN OF CARE
Problem: Safety - Medical Restraint  Goal: Remains free of injury from restraints (Restraint for Interference with Medical Device)  Description: INTERVENTIONS:  1. Determine that other, less restrictive measures have been tried or would not be effective before applying the restraint  2. Evaluate the patient's condition at the time of restraint application  3. Inform patient/family regarding the reason for restraint  4. Q2H: Monitor safety, psychosocial status, comfort, nutrition and hydration  Outcome: Progressing     Problem: Safety - Violent/Self-destructive Restraint  Goal: Remains free of injury from restraints (Restraint for Violent/Self-Destructive Behavior)  Description: INTERVENTIONS:  1. Determine that de-escalation and other, less restrictive measures have been tried or would not be effective before applying the restraint  2. Identify and document the criteria for restraint  3. Evaluate the patient's condition at the time of restraint application  4. Inform patient/family regarding the reason for restraint/seclusion  5. Q2H: Monitor comfort, nutrition and hydration needs  6. Q15M: Perform safety checks including skin, circulation, sensory, respiratory and psychological status  7. Ensure continuous observation  8. Identify and implement measures to help patient regain control, assess readiness for release and initiate progressive release per policy  Outcome: Progressing     Problem: Anxiety  Goal: Will report anxiety at manageable levels  Description: INTERVENTIONS:  1. Administer medication as ordered  2. Teach and rehearse alternative coping skills  3. Provide emotional support with 1:1 interaction with staff  Outcome: Progressing     Problem: Coping  Goal: Pt/Family able to verbalize concerns and demonstrate effective coping strategies  Description: INTERVENTIONS:  1. Assist patient/family to identify coping skills, available support systems and cultural and spiritual values  2.  Provide emotional support, including active listening and acknowledgement of concerns of patient and caregivers  3. Reduce environmental stimuli, as able  4. Instruct patient/family in relaxation techniques, as appropriate  5. Assess for spiritual pain/suffering and initiate Spiritual Care, Psychosocial Clinical Specialist consults as needed  Outcome: Progressing     Problem: Decision Making  Goal: Pt/Family able to effectively weigh alternatives and participate in decision making related to treatment and care  Description: INTERVENTIONS:  1. Determine when there are differences between patient's view, family's view, and healthcare provider's view of condition  2. Facilitate patient and family articulation of goals for care  3. Help patient and family identify pros/cons of alternative solutions  4. Provide information as requested by patient/family  5. Respect patient/family right to receive or not to receive information  6. Serve as a liaison between patient and family and health care team  7. Initiate Consults from Ethics, Palliative Care or initiate [address] [place] [address] as is appropriate  Outcome: Progressing     Problem: Behavior  Goal: Pt/Family maintain appropriate behavior and adhere to behavioral management agreement, if implemented  Description: INTERVENTIONS:  1. Assess patient/family's coping skills and  non-compliant behavior (including use of illegal substances)  2. Notify security of behavior or suspected illegal substances which indicate the need for search of the family and/or belongings  3. Encourage verbalization of thoughts and concerns in a socially appropriate manner  4. Utilize positive, consistent limit setting strategies supporting safety of patient, staff and others  5. Encourage participation in the decision making process about the behavioral management agreement  6. If a visitor's behavior poses a threat to safety call refer to organization policy.   7. Initiate consult with , Psychosocial CNS, Spiritual Care as appropriate  Outcome: Progressing
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
## 2022-11-23 NOTE — ED NOTES
Patient resting in bed quietly, cooperative with staff for vital signs, appears drowsy, not interacting with staff verbally.       Tha Whitman RN  11/23/22 9629

## 2022-11-24 PROCEDURE — 1240000000 HC EMOTIONAL WELLNESS R&B

## 2022-11-24 PROCEDURE — 6370000000 HC RX 637 (ALT 250 FOR IP): Performed by: NURSE PRACTITIONER

## 2022-11-24 RX ADMIN — OLANZAPINE 5 MG: 5 TABLET, FILM COATED ORAL at 20:58

## 2022-11-24 RX ADMIN — MELATONIN 3 MG ORAL TABLET 3 MG: 3 TABLET ORAL at 20:58

## 2022-11-24 RX ADMIN — HYDROXYZINE PAMOATE 50 MG: 50 CAPSULE ORAL at 17:48

## 2022-11-24 NOTE — PROGRESS NOTES
BEHAVIORAL HEALTH FOLLOW-UP NOTE     2022     Patient was seen and examined in person, Chart reviewed   Patient's case discussed with staff/team    Chief Complaint: \"I am tired. \"    Interim History: Patient seen in her room very flat blunted affect stuff and he she is had underlying irritability she has very poor insight and judgment regarding circumstance of hospitalization and need for treatment appears guarded isolative and somewhat paranoid she offers very little conversation indicating that she feels \"tired. \"  Poor insight and judgment      Appetite:   [x] Normal/Unchanged  [] Increased  [] Decreased      Sleep:       [x] Normal/Unchanged  [] Fair       [] Poor              Energy:    [x] Normal/Unchanged  [] Increased  [] Decreased        SI [] Present  [x] Absent    HI  []Present  [x] Absent     Aggression:  [] yes  [x] no    Patient is [x] able  [] unable to CONTRACT FOR SAFETY     PAST MEDICAL/PSYCHIATRIC HISTORY:   Past Medical History:   Diagnosis Date    Abnormal Pap smear of cervix     in  - precancerous cells    Allergic     seasonal    Bipolar 1 disorder ([facility] [address] 2022    Emotional disorder     Rh sensitized     Traumatic injury during pregnancy, third trimester        FAMILY/SOCIAL HISTORY:  Family History   Problem Relation Age of Onset    Hypertension Mother     Hypertension Father      Social History     Socioeconomic History    Marital status: Single     Spouse name: Not on file    Number of children: 3    Years of education: 12    Highest education level: Not on file   Occupational History    Not on file   Tobacco Use    Smoking status: Former     Types: Cigarettes     Quit date: 1/3/2015     Years since quittin.8    Smokeless tobacco: Never   Vaping Use    Vaping Use: Never used   Substance and Sexual Activity    Alcohol use: No     Comment: before pregnant    Drug use: No     Comment: last used 2 years ago    Sexual activity: Yes     Partners: Male   Other Topics Concern    Not on file   Social History Narrative    Not on file     Social Determinants of Health     Financial Resource Strain: Not on file   Food Insecurity: Not on file   Transportation Needs: Not on file   Physical Activity: Not on file   Stress: Not on file   Social Connections: Not on file   Intimate Partner Violence: Not on file   Housing Stability: Not on file           ROS:  [x] All negative/unchanged except if checked.  Explain positive(checked items) below:  [] Constitutional  [] Eyes  [] Ear/Nose/Mouth/Throat  [] Respiratory  [] CV  [] GI  []   [] Musculoskeletal  [] Skin/Breast  [] Neurological  [] Endocrine  [] Heme/Lymph  [] Allergic/Immunologic    Explanation:     MEDICATIONS:    Current Facility-Administered Medications:     acetaminophen (TYLENOL) tablet 650 mg, 650 mg, Oral, T1A PRN, Richad Query Dellick, APRN - CNP    magnesium hydroxide (MILK OF MAGNESIA) 400 MG/5ML suspension 30 mL, 30 mL, Oral, Daily PRN, Richad Query Dellick, APRN - CNP    aluminum & magnesium hydroxide-simethicone (MAALOX) 200-200-20 MG/5ML suspension 30 mL, 30 mL, Oral, PRN, Richad Query Dellick, APRN - CNP    hydrOXYzine pamoate (VISTARIL) capsule 50 mg, 50 mg, Oral, TID PRN, Richad Query Dellick, APRN - CNP    haloperidol (HALDOL) tablet 5 mg, 5 mg, Oral, Q6H PRN **OR** haloperidol lactate (HALDOL) injection 5 mg, 5 mg, IntraMUSCular, N1J PRN, Richad Query Dellick, APRN - CNP    melatonin tablet 3 mg, 3 mg, Oral, Nightly, Richad Query Dellick, APRN - CNP, 3 mg at 11/23/22 2033    OLANZapine (ZYPREXA) tablet 5 mg, 5 mg, Oral, Nightly, Richad Query Dellick, APRN - CNP, 5 mg at 11/23/22 2034      Examination:  BP (!) 90/51   Pulse 74   Temp 97.4 °F (36.3 °C) (Oral)   Resp 15   Ht 5' 3\" (1.6 m)   Wt 132 lb 14.4 oz (60.3 kg)   LMP 10/23/2022 (Approximate) Comment: Pt. states she is due any day to start her peroid  SpO2 98%   Breastfeeding No   BMI 23.54 kg/m²   Gait - steady  Medication side effects(SE): denies    Mental Status Examination:    Level of consciousness: within normal limits   Appearance:  fair grooming and fair hygiene  Behavior/Motor:  no abnormalities noted  Attitude toward examiner:  cooperative  Speech:  spontaneous, normal rate and normal volume   Mood: \" Im tired\"  Affect: flat and blunted  Thought processes: Linear thought flight of ideas loose associations  Thought content: Denies auditory or visual hallucinations but appears preoccupied guarded and paranoid denies SI/HI intent or plan   Language: able to name objects and repeate phrases  Remote Memory: intact  Recent Memory: intact  Cognition:  oriented to person, place, and time   Fund of Knowledge: Vocabulary intact, pt is aware of current events and past history  Attetion and Concentration intact  Insight limited   Judgement limited       ASSESSMENT:   Patient symptoms are:  [] Well controlled  [] Improving  [] Worsening  [x] No change      Diagnosis:   Principal Problem:    [facility]  Resolved Problems:    Bipolar 1 disorder ([facility]      LABS:    Recent Labs     11/22/22 2013   WBC 8.1   HGB 11.9        Recent Labs     11/22/22 2013      K 3.2*      CO2 18*   BUN 9   CREATININE 0.6   GLUCOSE 141*     Recent Labs     11/22/22 2013   BILITOT 0.2   ALKPHOS 108*   AST 16   ALT 15     Lab Results   Component Value Date/Time    LABAMPH NOT DETECTED 11/22/2022 08:13 PM    BARBSCNU NOT DETECTED 11/22/2022 08:13 PM    LABBENZ NOT DETECTED 11/22/2022 08:13 PM    CANNAB POSITIVE 05/20/2013 11:45 PM    COCAINESCRN NOT DETECTED 05/20/2013 11:45 PM    LABMETH NOT DETECTED 11/22/2022 08:13 PM    OPIATESCREENURINE NOT DETECTED 11/22/2022 08:13 PM    PHENCYCLIDINESCREENURINE NOT DETECTED 11/22/2022 08:13 PM    ETOH <10 11/22/2022 08:13 PM     Lab Results   Component Value Date/Time    TSH 1.030 02/17/2015 04:10 PM     No results found for: LITHIUM  No results found for: VALPROATE, CBMZ      Treatment Plan:  Reviewed current Medications with the patient.    Risks, benefits, side effects, drug-to-drug interactions and alternatives to treatment were discussed. Collateral information:   CD evaluation  Encourage patient to attend group and other milieu activities.   Discharge planning discussed with the patient and treatment team.    Continue Zyprexa 5 mg at bedtime     PSYCHOTHERAPY/COUNSELING:  [x] Therapeutic interview  [x] Supportive  [] CBT  [] Ongoing  [] Other    [x] Patient continues to need, on a daily basis, active treatment furnished directly by or requiring the supervision of inpatient psychiatric personnel      Anticipated Length of stay: 3 to 7 days based on stability            Electronically signed by BARBY Jackson CNP on 72/69/2815 at 10:15 AM

## 2022-11-24 NOTE — PROGRESS NOTES
Pt has underlying irritability angry about being here insight and judgement are poor denies any SI HI or tay currently emotional support given

## 2022-11-24 NOTE — GROUP NOTE
Group Therapy Note    Date: 11/24/2022    Group Start Time: 1300  Group End Time: 1330  Group Topic: Cognitive Skills    [facility] ACUTE [facility] 1    PREM Ward LSW        Group Therapy Note    Attendees: 11       Patient's Goal:  Pt will be able to verbalize understanding regarding building new habits. Notes:  Pt made connections and participated in group. Status After Intervention:  Improved    Participation Level:  Active Listener and Interactive    Participation Quality: Appropriate, Attentive, Sharing, and Supportive      Speech:  normal      Thought Process/Content: Logical  Linear      Affective Functioning: Congruent      Mood: depressed      Level of consciousness:  Alert, Oriented x4, and Attentive      Response to Learning: Able to verbalize current knowledge/experience      Endings: None Reported    Modes of Intervention: Education, Support, Socialization, and Exploration      Discipline Responsible: /Counselor      Signature:  PREM Ward LSW

## 2022-11-25 PROCEDURE — 6370000000 HC RX 637 (ALT 250 FOR IP): Performed by: NURSE PRACTITIONER

## 2022-11-25 PROCEDURE — 1240000000 HC EMOTIONAL WELLNESS R&B

## 2022-11-25 RX ORDER — OLANZAPINE 10 MG/1
10 TABLET ORAL NIGHTLY
Status: DISCONTINUED | OUTPATIENT
Start: 2022-11-25 | End: 2022-11-28 | Stop reason: HOSPADM

## 2022-11-25 RX ORDER — OXCARBAZEPINE 150 MG/1
150 TABLET, FILM COATED ORAL 2 TIMES DAILY
Status: DISCONTINUED | OUTPATIENT
Start: 2022-11-25 | End: 2022-11-26

## 2022-11-25 RX ADMIN — OLANZAPINE 10 MG: 10 TABLET, FILM COATED ORAL at 21:22

## 2022-11-25 RX ADMIN — OXCARBAZEPINE 150 MG: 150 TABLET, FILM COATED ORAL at 21:22

## 2022-11-25 RX ADMIN — HYDROXYZINE PAMOATE 50 MG: 50 CAPSULE ORAL at 21:22

## 2022-11-25 RX ADMIN — OXCARBAZEPINE 150 MG: 150 TABLET, FILM COATED ORAL at 10:29

## 2022-11-25 RX ADMIN — MELATONIN 3 MG ORAL TABLET 3 MG: 3 TABLET ORAL at 21:22

## 2022-11-25 ASSESSMENT — PAIN SCALES - GENERAL: PAINLEVEL_OUTOF10: 0

## 2022-11-25 NOTE — PLAN OF CARE
Problem: Safety - Medical Restraint  Goal: Remains free of injury from restraints (Restraint for Interference with Medical Device)  Description: INTERVENTIONS:  1. Determine that other, less restrictive measures have been tried or would not be effective before applying the restraint  2. Evaluate the patient's condition at the time of restraint application  3. Inform patient/family regarding the reason for restraint  4. Q2H: Monitor safety, psychosocial status, comfort, nutrition and hydration  Outcome: Progressing     Problem: Safety - Violent/Self-destructive Restraint  Goal: Remains free of injury from restraints (Restraint for Violent/Self-Destructive Behavior)  Description: INTERVENTIONS:  1. Determine that de-escalation and other, less restrictive measures have been tried or would not be effective before applying the restraint  2. Identify and document the criteria for restraint  3. Evaluate the patient's condition at the time of restraint application  4. Inform patient/family regarding the reason for restraint/seclusion  5. Q2H: Monitor comfort, nutrition and hydration needs  6. Q15M: Perform safety checks including skin, circulation, sensory, respiratory and psychological status  7. Ensure continuous observation  8. Identify and implement measures to help patient regain control, assess readiness for release and initiate progressive release per policy  Outcome: Progressing     Problem: Anxiety  Goal: Will report anxiety at manageable levels  Description: INTERVENTIONS:  1. Administer medication as ordered  2. Teach and rehearse alternative coping skills  3. Provide emotional support with 1:1 interaction with staff  Outcome: Progressing     Problem: Coping  Goal: Pt/Family able to verbalize concerns and demonstrate effective coping strategies  Description: INTERVENTIONS:  1. Assist patient/family to identify coping skills, available support systems and cultural and spiritual values  2.  Provide emotional support, including active listening and acknowledgement of concerns of patient and caregivers  3. Reduce environmental stimuli, as able  4. Instruct patient/family in relaxation techniques, as appropriate  5. Assess for spiritual pain/suffering and initiate Spiritual Care, Psychosocial Clinical Specialist consults as needed  Outcome: Progressing     Problem: Decision Making  Goal: Pt/Family able to effectively weigh alternatives and participate in decision making related to treatment and care  Description: INTERVENTIONS:  1. Determine when there are differences between patient's view, family's view, and healthcare provider's view of condition  2. Facilitate patient and family articulation of goals for care  3. Help patient and family identify pros/cons of alternative solutions  4. Provide information as requested by patient/family  5. Respect patient/family right to receive or not to receive information  6. Serve as a liaison between patient and family and health care team  7. Initiate Consults from Ethics, Palliative Care or initiate [address] [place] [address] as is appropriate  Outcome: Progressing     Problem: Behavior  Goal: Pt/Family maintain appropriate behavior and adhere to behavioral management agreement, if implemented  Description: INTERVENTIONS:  1. Assess patient/family's coping skills and  non-compliant behavior (including use of illegal substances)  2. Notify security of behavior or suspected illegal substances which indicate the need for search of the family and/or belongings  3. Encourage verbalization of thoughts and concerns in a socially appropriate manner  4. Utilize positive, consistent limit setting strategies supporting safety of patient, staff and others  5. Encourage participation in the decision making process about the behavioral management agreement  6. If a visitor's behavior poses a threat to safety call refer to organization policy.   7. Initiate consult with , Psychosocial CNS, Spiritual Care as appropriate  Outcome: Progressing

## 2022-11-25 NOTE — CARE COORDINATION
Sw met with pt. Pt found sitting in her bed, is bright, appropriate, pleasant, shares good eye contact. Pt states that she is doing good, that she is just \"going through the process\". She reports that she just wants some help with her anxiety. Pt denied to sign an JESUS for anyone. Pt states that she feels ready to leave, reports that she misses her children. Pt reports that her children are currently safe with their grandma. Pt reports that she received good news, that her childcare is covered so she is able to work full time hours at work again. Pt thoughts appear logical and linear. Pt insight/judgement are fair.

## 2022-11-25 NOTE — PROGRESS NOTES
PT DAILY TREATMENT NOTE 2-15    Patient Name: Abbie Nuno  Date:2022  : 1972  [x]  Patient  Verified  Payor: [address] / Plan: Deeclementina Connorurbanoalisa / Product Type: Managed Care Medicaid /    In time: 10:11  Out time: 11:05  Total Treatment Time (min): 54  Visit #:  3    Treatment Area: Headache [R51.9]  Neck pain [M54.2]    SUBJECTIVE  Pain Level (0-10 scale): 2  Any medication changes, allergies to medications, adverse drug reactions, diagnosis change, or new procedure performed?: [x] No    [] Yes (see summary sheet for update)  Subjective functional status/changes:   [] No changes reported  Patient reports that she has been feeling a little better. The headaches were better this weekend but Friday she had a bad combination tension HA and migraine day. Moving her head is getting easier.      OBJECTIVE    Modality rationale: decrease pain and increase tissue extensibility to improve the patients ability to drive and perform ADL's    Min Type Additional Details      15 [x] Estim: []Att   [x]Unatt    []TENS instruct                  [x]IFC  []Premod   []NMES                     []Other:  []w/US   []w/ice   [x]w/heat  Position: seated  Location: cervical        []  Traction: [] Cervical       []Lumbar                       [] Prone          []Supine                       []Intermittent   []Continuous Lbs:  [] before manual  [] after manual  []w/heat    []  Ultrasound: []Continuous   [] Pulsed                       at: []1MHz   []3MHz Location:  W/cm2:    [] Paraffin         Location:   []w/heat    []  Ice     [x]  Heat  []  Ice massage Position: supine  Location: cervical     []  Laser  []  Other: Position:  Location:      [x]  Vasopneumatic Device Pressure:       [x] lo [] med [] hi   Temperature: 34     [x] Skin assessment post-treatment:  [x]intact []redness- no adverse reaction    []redness - adverse reaction:         39 min Therapeutic Exercise:  [x] See flow sheet : Resting quietly in bed with eyes closed q 15minute checks continued throughout shift Rationale: increase ROM and increase strength to improve the patients ability to drive and perform ADL's     0 min Manual Therapy:  SOR, manual distraction, STM B UT/LS, cervical paraspinals, PROM UT stretching    Rationale: decrease pain, increase ROM, and increase tissue extensibility  to improve the patients ability to drive and perform ADL's             With   [] TE   [] TA   [] Neuro   [] SC   [] other: Patient Education: [x] Review HEP    [] Progressed/Changed HEP based on:   [] positioning   [] body mechanics   [] transfers   [] heat/ice application    [] other:      Other Objective/Functional Measures: Continues to demonstrate improved cervical range with excellent tolerance for progressed HEP  Requires repeated cues to relax shoulder shrug throughout exercise    Pain Level (0-10 scale) post treatment: 1    ASSESSMENT/Changes in Function:     Patient will continue to benefit from skilled PT services to modify and progress therapeutic interventions, address functional mobility deficits, address ROM deficits, address strength deficits, analyze and address soft tissue restrictions, analyze and cue movement patterns, analyze and modify body mechanics/ergonomics, assess and modify postural abnormalities, and instruct in home and community integration to attain remaining goals.      []  See Plan of Care  []  See progress note/recertification  []  See Discharge Summary         Progress towards goals / Updated goals:  Demonstrating sustained improvements in cervical range    PLAN  [x]  Upgrade activities as tolerated     [x]  Continue plan of care  [x]  Update interventions per flow sheet       []  Discharge due to:_  []  Other:_      Nahum Husain DPT 9/26/2022

## 2022-11-25 NOTE — PROGRESS NOTES
Patient denies SI,HI, or any Hallucinations. She is out on the unit to self. Is pleasant during assessment. Takes her meds and attended group. No behavior issues noted. Will continue to monitor.

## 2022-11-25 NOTE — PLAN OF CARE
Problem: Safety - Medical Restraint  Goal: Remains free of injury from restraints (Restraint for Interference with Medical Device)  Description: INTERVENTIONS:  1. Determine that other, less restrictive measures have been tried or would not be effective before applying the restraint  2. Evaluate the patient's condition at the time of restraint application  3. Inform patient/family regarding the reason for restraint  4. Q2H: Monitor safety, psychosocial status, comfort, nutrition and hydration  11/25/2022 1843 by Elsa Aguirre RN  Outcome: Progressing  11/25/2022 1840 by Elsa Aguirre RN  Outcome: Progressing     Problem: Safety - Violent/Self-destructive Restraint  Goal: Remains free of injury from restraints (Restraint for Violent/Self-Destructive Behavior)  Description: INTERVENTIONS:  1. Determine that de-escalation and other, less restrictive measures have been tried or would not be effective before applying the restraint  2. Identify and document the criteria for restraint  3. Evaluate the patient's condition at the time of restraint application  4. Inform patient/family regarding the reason for restraint/seclusion  5. Q2H: Monitor comfort, nutrition and hydration needs  6. Q15M: Perform safety checks including skin, circulation, sensory, respiratory and psychological status  7. Ensure continuous observation  8. Identify and implement measures to help patient regain control, assess readiness for release and initiate progressive release per policy  [id number] by Elsa Aguirre RN  Outcome: Progressing  11/25/2022 1840 by Elsa Aguirre RN  Outcome: Progressing     Problem: Anxiety  Goal: Will report anxiety at manageable levels  Description: INTERVENTIONS:  1. Administer medication as ordered  2. Teach and rehearse alternative coping skills  3.  Provide emotional support with 1:1 interaction with staff  11/25/2022 1843 by Elsa Aguirre RN  Outcome: Progressing  11/25/2022 1840 by Elsa Aguirre RN  Outcome: Progressing     Problem: Coping  Goal: Pt/Family able to verbalize concerns and demonstrate effective coping strategies  Description: INTERVENTIONS:  1. Assist patient/family to identify coping skills, available support systems and cultural and spiritual values  2. Provide emotional support, including active listening and acknowledgement of concerns of patient and caregivers  3. Reduce environmental stimuli, as able  4. Instruct patient/family in relaxation techniques, as appropriate  5. Assess for spiritual pain/suffering and initiate Spiritual Care, Psychosocial Clinical Specialist consults as needed  11/25/2022 1843 by Jason Ramos RN  Outcome: Progressing  11/25/2022 1840 by Jason Ramos RN  Outcome: Progressing     Problem: Decision Making  Goal: Pt/Family able to effectively weigh alternatives and participate in decision making related to treatment and care  Description: INTERVENTIONS:  1. Determine when there are differences between patient's view, family's view, and healthcare provider's view of condition  2. Facilitate patient and family articulation of goals for care  3. Help patient and family identify pros/cons of alternative solutions  4. Provide information as requested by patient/family  5. Respect patient/family right to receive or not to receive information  6. Serve as a liaison between patient and family and health care team  7. Initiate Consults from Ethics, Palliative Care or initiate [facility] as is appropriate  11/25/2022 1843 by Jason Ramos RN  Outcome: Progressing  11/25/2022 1840 by Jason Ramos RN  Outcome: Progressing     Problem: Behavior  Goal: Pt/Family maintain appropriate behavior and adhere to behavioral management agreement, if implemented  Description: INTERVENTIONS:  1. Assess patient/family's coping skills and  non-compliant behavior (including use of illegal substances)  2.  Notify security of behavior or suspected illegal substances which indicate the need for search of the family and/or belongings  3. Encourage verbalization of thoughts and concerns in a socially appropriate manner  4. Utilize positive, consistent limit setting strategies supporting safety of patient, staff and others  5. Encourage participation in the decision making process about the behavioral management agreement  6. If a visitor's behavior poses a threat to safety call refer to organization policy.   7. Initiate consult with , Psychosocial CNS, Spiritual Care as appropriate  11/25/2022 1843 by Kendra Alatorre RN  Outcome: Progressing  11/25/2022 1840 by Kendra Alatorre RN  Outcome: Progressing

## 2022-11-25 NOTE — GROUP NOTE
Group Therapy Note    Date: 11/25/2022    Group Start Time: 1105  Group End Time: 7206  Group Topic: Psychotherapy    [facility]  [address], [place], [place]        Group Therapy Note    Attendees: 9       Patient's Goal:  To increase socialization and improve interpersonal relationships. Notes:  Pt was an active participant in group discussion. Status After Intervention:  Improved    Participation Level: Active Listener and Interactive    Participation Quality: Appropriate, Attentive, Sharing, and Supportive      Speech:  normal      Thought Process/Content: Logical  Linear      Affective Functioning: Congruent      Mood: anxious and depressed      Level of consciousness:  Alert, Oriented x4, and Attentive      Response to Learning: Able to verbalize current knowledge/experience, Able to verbalize/acknowledge new learning, Able to retain information, Capable of insight, and Progressing to goal      Endings: None Reported    Modes of Intervention: Support, Socialization, and Exploration      Discipline Responsible: /Counselor      Signature:   PREM Wright, [facility]

## 2022-11-25 NOTE — PROGRESS NOTES
Dragan Mccarthy 46 NOTE     2022     Patient was seen and examined in person, Chart reviewed   Patient's case discussed with staff/team    Chief Complaint: \"I am feeling better. \"    Interim History: Patient seen in her room states that she is \"feeling better. \"  States that she is happy that things are over with her and her boyfriend states that it feels good to be free and single. She states they broke up approximately 3 months ago. She denies SI/HI intent or plan denies auditory or visual hallucination she was out attending group today less isolate less guarded to her room her affect is brighter she is less flat less blunted states the medications are helping her \"a lot. \"    Appetite:   [x] Normal/Unchanged  [] Increased  [] Decreased      Sleep:       [x] Normal/Unchanged  [] Fair       [] Poor              Energy:    [x] Normal/Unchanged  [] Increased  [] Decreased        SI [] Present  [x] Absent    HI  []Present  [x] Absent     Aggression:  [] yes  [x] no    Patient is [x] able  [] unable to CONTRACT FOR SAFETY     PAST MEDICAL/PSYCHIATRIC HISTORY:   Past Medical History:   Diagnosis Date    Abnormal Pap smear of cervix     in  - precancerous cells    Allergic     seasonal    B[facility] ([facility] 2022    Emotional disorder     Rh sensitized     Traumatic injury during pregnancy, third trimester        FAMILY/SOCIAL HISTORY:  Family History   Problem Relation Age of Onset    Hypertension Mother     Hypertension Father      Social History     Socioeconomic History    Marital status: Single     Spouse name: Not on file    Number of children: 3    Years of education: 12    Highest education level: Not on file   Occupational History    Not on file   Tobacco Use    Smoking status: Former     Types: Cigarettes     Quit date: 1/3/2015     Years since quittin.8    Smokeless tobacco: Never   Vaping Use    Vaping Use: Never used   Substance and Sexual Activity    Alcohol use:  No Comment: before pregnant    Drug use: No     Comment: last used 2 years ago    Sexual activity: Yes     Partners: Male   Other Topics Concern    Not on file   Social History Narrative    Not on file     Social Determinants of Health     Financial Resource Strain: Not on file   Food Insecurity: Not on file   Transportation Needs: Not on file   Physical Activity: Not on file   Stress: Not on file   Social Connections: Not on file   Intimate Partner Violence: Not on file   Housing Stability: Not on file           ROS:  [x] All negative/unchanged except if checked.  Explain positive(checked items) below:  [] Constitutional  [] Eyes  [] Ear/Nose/Mouth/Throat  [] Respiratory  [] CV  [] GI  []   [] Musculoskeletal  [] Skin/Breast  [] Neurological  [] Endocrine  [] Heme/Lymph  [] Allergic/Immunologic    Explanation:     MEDICATIONS:    Current Facility-Administered Medications:     OXcarbazepine (TRILEPTAL) tablet 150 mg, 150 mg, Oral, BID, Celeste Jones APRN - CNP, 641 mg at 11/25/22 1029    OLANZapine (ZYPREXA) tablet 10 mg, 10 mg, Oral, Nightly, Barbara Jones APRN - CNP    acetaminophen (TYLENOL) tablet 650 mg, 650 mg, Oral, L6D PRN, Celeste Jones APRN - CNP    magnesium hydroxide (MILK OF MAGNESIA) 400 MG/5ML suspension 30 mL, 30 mL, Oral, Daily PRN, Celeste Jones APRN - CNP    aluminum & magnesium hydroxide-simethicone (MAALOX) 200-200-20 MG/5ML suspension 30 mL, 30 mL, Oral, PRN, Celeste Jones, APRN - CNP    hydrOXYzine pamoate (VISTARIL) capsule 50 mg, 50 mg, Oral, TID PRN, Celeste Jones, APRN - CNP, 50 mg at 11/24/22 1748    haloperidol (HALDOL) tablet 5 mg, 5 mg, Oral, Q6H PRN **OR** haloperidol lactate (HALDOL) injection 5 mg, 5 mg, IntraMUSCular, Q6X PRN, Barbara Jones APRN - CNP    melatonin tablet 3 mg, 3 mg, Oral, Nightly, Celeste Jones APRN - CNP, 3 mg at 11/24/22 2058      Examination:  /81   Pulse (!) 110   Temp 97.2 °F (36.2 °C) (Oral)   Resp 18   Ht 5' 3\" (1.6 m)   Wt 132 lb 14.4 oz (60.3 kg)   LMP 10/23/2022 (Approximate) Comment: Pt. states she is due any day to start her peroid  SpO2 98%   Breastfeeding No   BMI 23.54 kg/m²   Gait - steady  Medication side effects(SE): denies    Mental Status Examination:    Level of consciousness:  within normal limits   Appearance:  fair grooming and fair hygiene  Behavior/Motor:  no abnormalities noted  Attitude toward examiner:  cooperative  Speech:  spontaneous, normal rate and normal volume   Mood: \" Im feeling better\"  Affect: Brighter  Thought processes: Linear thought flight of ideas loose associations  Thought content: Denies auditory or visual hallucinations but appears preoccupied guarded and paranoid denies SI/HI intent or plan   Language: able to name objects and repeate phrases  Remote Memory: intact  Recent Memory: intact  Cognition:  oriented to person, place, and time   Fund of Knowledge: Vocabulary intact, pt is aware of current events and past history  Attetion and Concentration intact  Insight limited   Judgement limited       ASSESSMENT:   Patient symptoms are:  [] Well controlled  [] Improving  [] Worsening  [x] No change      Diagnosis:   Principal Problem:    Acute psychosis ([facility]  Resolved Problems:    Bipolar 1 disorder ([facility]      LABS:    Recent Labs     11/22/22 2013   WBC 8.1   HGB 11.9        Recent Labs     11/22/22 2013      K 3.2*      CO2 18*   BUN 9   CREATININE 0.6   GLUCOSE 141*     Recent Labs     11/22/22 2013   BILITOT 0.2   ALKPHOS 108*   AST 16   ALT 15     Lab Results   Component Value Date/Time    LABAMPH NOT DETECTED 11/22/2022 08:13 PM    BARBSCNU NOT DETECTED 11/22/2022 08:13 PM    LABBENZ NOT DETECTED 11/22/2022 08:13 PM    CANNAB POSITIVE 05/20/2013 11:45 PM    COCAINESCRN NOT DETECTED 05/20/2013 11:45 PM    LABMETH NOT DETECTED 11/22/2022 08:13 PM    OPIATESCREENURINE NOT DETECTED 11/22/2022 08:13 PM    PHENCYCLIDINESCREENURINE NOT DETECTED 11/22/2022 08:13 PM    ETOH <10 11/22/2022 08:13 PM     Lab Results   Component Value Date/Time    TSH 1.030 02/17/2015 04:10 PM     No results found for: LITHIUM  No results found for: VALPROATE, CBMZ      Treatment Plan:  Reviewed current Medications with the patient. Risks, benefits, side effects, drug-to-drug interactions and alternatives to treatment were discussed. Collateral information:   CD evaluation  Encourage patient to attend group and other milieu activities.   Discharge planning discussed with the patient and treatment team.    Increase Zyprexa 10 mg at bedtime  Start Trileptal 150 mg twice daily for mood stabilization    PSYCHOTHERAPY/COUNSELING:  [x] Therapeutic interview  [x] Supportive  [] CBT  [] Ongoing  [] Other    [x] Patient continues to need, on a daily basis, active treatment furnished directly by or requiring the supervision of inpatient psychiatric personnel      Anticipated Length of stay: 3 to 7 days based on stability            Electronically signed by BARBY Cannon CNP on 05/68/4134 at 12:18 PM

## 2022-11-25 NOTE — PROGRESS NOTES
More isolative to room tonight has underlying irritabilty denies any SI HI or tay emotional support given

## 2022-11-26 PROCEDURE — 6370000000 HC RX 637 (ALT 250 FOR IP): Performed by: NURSE PRACTITIONER

## 2022-11-26 PROCEDURE — 1240000000 HC EMOTIONAL WELLNESS R&B

## 2022-11-26 PROCEDURE — 99232 SBSQ HOSP IP/OBS MODERATE 35: CPT | Performed by: NURSE PRACTITIONER

## 2022-11-26 RX ORDER — OXCARBAZEPINE 300 MG/1
300 TABLET, FILM COATED ORAL 2 TIMES DAILY
Status: DISCONTINUED | OUTPATIENT
Start: 2022-11-26 | End: 2022-11-28 | Stop reason: HOSPADM

## 2022-11-26 RX ADMIN — OXCARBAZEPINE 150 MG: 150 TABLET, FILM COATED ORAL at 08:43

## 2022-11-26 RX ADMIN — OLANZAPINE 10 MG: 10 TABLET, FILM COATED ORAL at 20:59

## 2022-11-26 RX ADMIN — ALUMINUM HYDROXIDE, MAGNESIUM HYDROXIDE, AND SIMETHICONE 30 ML: 200; 200; 20 SUSPENSION ORAL at 03:55

## 2022-11-26 RX ADMIN — OXCARBAZEPINE 300 MG: 300 TABLET, FILM COATED ORAL at 20:59

## 2022-11-26 RX ADMIN — MELATONIN 3 MG ORAL TABLET 3 MG: 3 TABLET ORAL at 20:59

## 2022-11-26 ASSESSMENT — PAIN SCALES - GENERAL
PAINLEVEL_OUTOF10: 0

## 2022-11-26 NOTE — PROGRESS NOTES
Patient denies SI/HI/AVH. Reports anxiety 0/10, depression 0/10. A&O x 4, denies pain. Observed at nurses station is cooperative, guarded  medication compliant. No sxs of distress noted. No behaviors noted. Staff will continue to provide support and interventions when needed or requested. Purposeful rounds continued Q 15 minutes.

## 2022-11-26 NOTE — GROUP NOTE
Group Therapy Note    Date: 11/26/2022    Group Start Time: 1350  Group End Time: 7210  Group Topic: Cognitive Skills    [facility]  [address] PRE[address] [place], [facility]        Group Therapy Note    Attendees: 7       Patient's Goal:  Pt will be able to identify what they currently use for mindfulness and new ways to practice mindfulness. Pt will be able to identify ways that mindfulness is able to help with anxiety. Notes:  Pt participated in group and made connections. Status After Intervention:  Improved    Participation Level:  Active Listener    Participation Quality: Appropriate and Attentive      Speech:  normal      Thought Process/Content: Logical  Linear      Affective Functioning: Congruent      Mood: anxious      Level of consciousness:  Alert and Oriented x4      Response to Learning: Able to retain information      Endings: None Reported    Modes of Intervention: Education, Support, Socialization, Exploration, Clarifying, and Problem-solving      Discipline Responsible: /Counselor      Signature:  PREM [place], [place]

## 2022-11-26 NOTE — BH NOTE
Pt is stable and cooperative. Pt denies suicidal / homicidal ideations. Pt denies hallucinations. Thought processes are organized. Will follow and monitor.

## 2022-11-26 NOTE — PROGRESS NOTES
Pt came up to this nurse asking if her medications were causing an allergic reaction. Pt pointed to the tips of two of her fingers on her left hand that had a small red bhumika. Encouraged the pt to take medications. Pt stated that she feels like the medications are causing a panic attack. Emotional support given. Printed out medication list for pt. Educated pt on medications and allergic reactions to be aware. Pt verbalized understanding of medications. Will continue to monitor.

## 2022-11-26 NOTE — PROGRESS NOTES
Spiritual Support Group Note    Number of Participants in Group:    6                    Time: 11    Goal: Relief from isolation and loneliness             Heather Sharing             Self-understanding and gain insight              Acceptance and belonging            Recognize they are not alone                Socialization             Empowerment       Encouragement    Topic:  [x] Spiritual Wellness and Self Care                  [x] Hope                     [x] Connecting with Divine/Others        [] Thankfulness and Gratitude               []  Meaningfulness and Purpose               [] Forgiveness               [] Peace               [] Connect to [facility]      [] Other    Participation Level:   [x] Active Listener   [] Minimal   [] Monopolizing   [] Interactive   [] No Participation   []  Other:     Attention:   [x] Alert   [] Distractible   [] Drowsy   [] Poor   [] Other:    Manner:   [] Cooperative   [] Suspicious   [] Withdrawn   [] Guarded   [] Irritable   [] Inhospitable   [] Other:     Others Comments from Group:

## 2022-11-26 NOTE — PROGRESS NOTES
BEHAVIORAL HEALTH FOLLOW-UP NOTE     11/26/2022     Patient was seen and examined in person, Chart reviewed   Patient's case discussed with staff/team    Chief Complaint: \"I hope I still have a job\"    Interim History: Patient seen in her room this morning, she tells me that she thinks she will be fired from her job because she is still here. She is discharge focused today, tells me that her mom wants to known when she is being discharged. She does not make any delusional statements today. She is goal directed, future focused. States the medications are working well for her. She is significantly minimizing her hospitalization, very focused on returning to her job at the [employer]. States \"I love my job, need my job and I hope I don't loose my job for being stuck here\" She does have significant irritability due to not being discharged today.      Appetite:   [x] Normal/Unchanged  [] Increased  [] Decreased      Sleep:       [x] Normal/Unchanged  [] Fair       [] Poor              Energy:    [x] Normal/Unchanged  [] Increased  [] Decreased        SI [] Present  [x] Absent    HI  []Present  [x] Absent     Aggression:  [] yes  [x] no    Patient is [x] able  [] unable to CONTRACT FOR SAFETY     PAST MEDICAL/PSYCHIATRIC HISTORY:   Past Medical History:   Diagnosis Date    Abnormal Pap smear of cervix     in 2021 - precancerous cells    Allergic     seasonal    Bipolar 1 disorder ([facility] [address] 11/23/2022    Emotional disorder     Rh sensitized     Traumatic injury during pregnancy, third trimester        FAMILY/SOCIAL HISTORY:  Family History   Problem Relation Age of Onset    Hypertension Mother     Hypertension Father      Social History     Socioeconomic History    Marital status: Single     Spouse name: Not on file    Number of children: 3    Years of education: 12    Highest education level: Not on file   Occupational History    Not on file   Tobacco Use    Smoking status: Former     Types: Cigarettes     Quit date: 1/3/2015 Years since quittin.9    Smokeless tobacco: Never   Vaping Use    Vaping Use: Never used   Substance and Sexual Activity    Alcohol use: No     Comment: before pregnant    Drug use: No     Comment: last used 2 years ago    Sexual activity: Yes     Partners: Male   Other Topics Concern    Not on file   Social History Narrative    Not on file     Social Determinants of Health     Financial Resource Strain: Not on file   Food Insecurity: Not on file   Transportation Needs: Not on file   Physical Activity: Not on file   Stress: Not on file   Social Connections: Not on file   Intimate Partner Violence: Not on file   Housing Stability: Not on file           ROS:  [x] All negative/unchanged except if checked.  Explain positive(checked items) below:  [] Constitutional  [] Eyes  [] Ear/Nose/Mouth/Throat  [] Respiratory  [] CV  [] GI  []   [] Musculoskeletal  [] Skin/Breast  [] Neurological  [] Endocrine  [] Heme/Lymph  [] Allergic/Immunologic    Explanation:     MEDICATIONS:    Current Facility-Administered Medications:     OXcarbazepine (TRILEPTAL) tablet 150 mg, 150 mg, Oral, BID, Muriel Jones APRN - CNP, 102 mg at 22 0843    OLANZapine (ZYPREXA) tablet 10 mg, 10 mg, Oral, Nightly, Muriel Jones, APRN - CNP, 10 mg at 22    acetaminophen (TYLENOL) tablet 650 mg, 650 mg, Oral, P4U PRN, Muriel Jones APRN - CNP    magnesium hydroxide (MILK OF MAGNESIA) 400 MG/5ML suspension 30 mL, 30 mL, Oral, Daily PRN, Muriel Jones APRN - CNP    aluminum & magnesium hydroxide-simethicone (MAALOX) 200-200-20 MG/5ML suspension 30 mL, 30 mL, Oral, PRN, Muriel Sanchezk APRN - CNP, 30 mL at 22 0355    hydrOXYzine pamoate (VISTARIL) capsule 50 mg, 50 mg, Oral, TID PRN, Elzbieta Valencia APRN - CNP, 50 mg at 22    haloperidol (HALDOL) tablet 5 mg, 5 mg, Oral, Q6H PRN **OR** haloperidol lactate (HALDOL) injection 5 mg, 5 mg, IntraMUSCular, I4C PRN, Muriel Jones, BARBY - CNP    melatonin tablet 3 mg, 3 mg, Oral, Nightly, Ksenia Jones, APRN - CNP, 3 mg at 11/25/22 2122      Examination:  BP (!) 96/52   Pulse 84   Temp 98 °F (36.7 °C) (Temporal)   Resp 16   Ht 5' 3\" (1.6 m)   Wt 132 lb 14.4 oz (60.3 kg)   LMP 10/23/2022 (Approximate) Comment: Pt. states she is due any day to start her peroid  SpO2 98%   Breastfeeding No   BMI 23.54 kg/m²   Gait - steady  Medication side effects(SE): denies    Mental Status Examination:    Level of consciousness:  within normal limits   Appearance:  fair grooming and fair hygiene  Behavior/Motor:  no abnormalities noted  Attitude toward examiner:  cooperative  Speech:  spontaneous, normal rate and normal volume   Mood: \" Im feeling better\"  Affect: Brighter  Thought processes: Linear goal directed   Thought content: Denies auditory or visual hallucinations but appears suspicious denies SI/HI intent or plan   Language: able to name objects and repeate phrases  Remote Memory: intact  Recent Memory: intact  Cognition:  oriented to person, place, and time   Fund of Knowledge: Vocabulary intact, pt is aware of current events and past history  Attetion and Concentration intact  Insight limited   Judgement limited       ASSESSMENT:   Patient symptoms are:  [] Well controlled  [] Improving  [] Worsening  [x] No change      Diagnosis:   Principal Problem:    Acute psychosis ([facility]  Resolved Problems:    Bipolar 1 disorder ([facility]      LABS:    No results for input(s): WBC, HGB, PLT in the last 72 hours. No results for input(s): NA, K, CL, CO2, BUN, CREATININE, GLUCOSE in the last 72 hours. No results for input(s): BILITOT, ALKPHOS, AST, ALT in the last 72 hours.     Lab Results   Component Value Date/Time    LABAMPH NOT DETECTED 11/22/2022 08:13 PM    BARBSCNU NOT DETECTED 11/22/2022 08:13 PM    LABBENZ NOT DETECTED 11/22/2022 08:13 PM    CANNAB POSITIVE 05/20/2013 11:45 PM    COCAINESCRN NOT DETECTED 05/20/2013 11:45 PM    LABMETH NOT DETECTED 11/22/2022 08:13 PM OPIATESCREENURINE NOT DETECTED 11/22/2022 08:13 PM    PHENCYCLIDINESCREENURINE NOT DETECTED 11/22/2022 08:13 PM    ETOH <10 11/22/2022 08:13 PM     Lab Results   Component Value Date/Time    TSH 1.030 02/17/2015 04:10 PM     No results found for: LITHIUM  No results found for: VALPROATE, CBMZ      Treatment Plan:  Reviewed current Medications with the patient. Risks, benefits, side effects, drug-to-drug interactions and alternatives to treatment were discussed. Collateral information:   CD evaluation  Encourage patient to attend group and other milieu activities.   Discharge planning discussed with the patient and treatment team.    Increase Zyprexa 10 mg at bedtime  Start Trileptal 150 mg twice daily for mood stabilization    PSYCHOTHERAPY/COUNSELING:  [x] Therapeutic interview  [x] Supportive  [] CBT  [] Ongoing  [] Other    [x] Patient continues to need, on a daily basis, active treatment furnished directly by or requiring the supervision of inpatient psychiatric personnel      Anticipated Length of stay: 3 to 7 days based on stability            Electronically signed by BARBY Bauman CNP on 11/26/2022 at 2:17 PM

## 2022-11-26 NOTE — PLAN OF CARE
Problem: Coping  Goal: Pt/Family able to verbalize concerns and demonstrate effective coping strategies  Description: INTERVENTIONS:  1. Assist patient/family to identify coping skills, available support systems and cultural and spiritual values  2. Provide emotional support, including active listening and acknowledgement of concerns of patient and caregivers  3. Reduce environmental stimuli, as able  4. Instruct patient/family in relaxation techniques, as appropriate  5. Assess for spiritual pain/suffering and initiate Spiritual Care, Psychosocial Clinical Specialist consults as needed  Outcome: Progressing  Flowsheets (Taken 11/26/2022 1138)  Patient/family able to verbalize anxieties, fears, and concerns, and demonstrate effective coping: Assist patient/family to identify coping skills, available support systems and cultural and spiritual values     Problem: Decision Making  Goal: Pt/Family able to effectively weigh alternatives and participate in decision making related to treatment and care  Description: INTERVENTIONS:  1. Determine when there are differences between patient's view, family's view, and healthcare provider's view of condition  2. Facilitate patient and family articulation of goals for care  3. Help patient and family identify pros/cons of alternative solutions  4. Provide information as requested by patient/family  5. Respect patient/family right to receive or not to receive information  6. Serve as a liaison between patient and family and health care team  7.  Initiate Consults from Ethics, Palliative Care or initiate [address] [place] [address] as is appropriate  Outcome: Progressing  Flowsheets (Taken 11/26/2022 1138)  Patient/family able to effectively weigh alternatives and participate in decision making related to treatment and care: Provide information as requested by patient/family

## 2022-11-26 NOTE — PROGRESS NOTES
Patient resting quietly to self, respirations are even and unlabored. PRN Vistaril 50 mg administered this shift. No sxs of distress or discomfort noted. No PRN medications administered this shift. Purposeful rounds continued Q 15 minutes to ensure the safety of the patient while on the unit.

## 2022-11-27 PROCEDURE — 6370000000 HC RX 637 (ALT 250 FOR IP): Performed by: NURSE PRACTITIONER

## 2022-11-27 PROCEDURE — 1240000000 HC EMOTIONAL WELLNESS R&B

## 2022-11-27 PROCEDURE — 99232 SBSQ HOSP IP/OBS MODERATE 35: CPT | Performed by: NURSE PRACTITIONER

## 2022-11-27 RX ADMIN — MELATONIN 3 MG ORAL TABLET 3 MG: 3 TABLET ORAL at 21:21

## 2022-11-27 RX ADMIN — OXCARBAZEPINE 300 MG: 300 TABLET, FILM COATED ORAL at 21:21

## 2022-11-27 RX ADMIN — OXCARBAZEPINE 300 MG: 300 TABLET, FILM COATED ORAL at 08:41

## 2022-11-27 RX ADMIN — OLANZAPINE 10 MG: 10 TABLET, FILM COATED ORAL at 21:21

## 2022-11-27 ASSESSMENT — PAIN SCALES - GENERAL
PAINLEVEL_OUTOF10: 0

## 2022-11-27 NOTE — PROGRESS NOTES
BEHAVIORAL HEALTH FOLLOW-UP NOTE     2022     Patient was seen and examined in person, Chart reviewed   Patient's case discussed with staff/team    Chief Complaint: \"I am doing a lot better, much less anxious\"     Interim History: Patient seen in her room this morning, she is linear, goal directed wants to return to work, she is worried about her bills and wants to be discharged so she can pay her bills and return to her job. She states that the medications are working well, she feels her symptoms are improving. She does not make any delusional statements today. She is goal directed, future focused. 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 States \"I love my job, need my job\"  Pleasant. Denies any SI/HI intent or plan, denies any AVH, does not appear overtly or covertly psychotic or paranoid.      Appetite:   [x] Normal/Unchanged  [] Increased  [] Decreased      Sleep:       [x] Normal/Unchanged  [] Fair       [] Poor              Energy:    [x] Normal/Unchanged  [] Increased  [] Decreased        SI [] Present  [x] Absent    HI  []Present  [x] Absent     Aggression:  [] yes  [x] no    Patient is [x] able  [] unable to CONTRACT FOR SAFETY     PAST MEDICAL/PSYCHIATRIC HISTORY:   Past Medical History:   Diagnosis Date    Abnormal Pap smear of cervix     in  - precancerous cells    Allergic     seasonal    Bipolar 1 disorder ([facility] [address] 2022    Emotional disorder     Rh sensitized     Traumatic injury during pregnancy, third trimester        FAMILY/SOCIAL HISTORY:  Family History   Problem Relation Age of Onset    Hypertension Mother     Hypertension Father      Social History     Socioeconomic History    Marital status: Single     Spouse name: Not on file    Number of children: 3    Years of education: 12    Highest education level: Not on file   Occupational History    Not on file   Tobacco Use    Smoking status: Former     Types: Cigarettes     Quit date: 1/3/2015     Years since quittin.9 Smokeless tobacco: Never   Vaping Use    Vaping Use: Never used   Substance and Sexual Activity    Alcohol use: No     Comment: before pregnant    Drug use: No     Comment: last used 2 years ago    Sexual activity: Yes     Partners: Male   Other Topics Concern    Not on file   Social History Narrative    Not on file     Social Determinants of Health     Financial Resource Strain: Not on file   Food Insecurity: Not on file   Transportation Needs: Not on file   Physical Activity: Not on file   Stress: Not on file   Social Connections: Not on file   Intimate Partner Violence: Not on file   Housing Stability: Not on file           ROS:  [x] All negative/unchanged except if checked.  Explain positive(checked items) below:  [] Constitutional  [] Eyes  [] Ear/Nose/Mouth/Throat  [] Respiratory  [] CV  [] GI  []   [] Musculoskeletal  [] Skin/Breast  [] Neurological  [] Endocrine  [] Heme/Lymph  [] Allergic/Immunologic    Explanation:     MEDICATIONS:    Current Facility-Administered Medications:     OXcarbazepine (TRILEPTAL) tablet 300 mg, 300 mg, Oral, BID, Misael Cho APRN - CNP, 300 mg at 11/27/22 0841    OLANZapine (ZYPREXA) tablet 10 mg, 10 mg, Oral, Nightly, Eston Kam Buenrostrolick, APRN - CNP, 10 mg at 11/26/22 2059    acetaminophen (TYLENOL) tablet 650 mg, 650 mg, Oral, P4Z PRN, Rachelle Sanchezk APRN - CNP    magnesium hydroxide (MILK OF MAGNESIA) 400 MG/5ML suspension 30 mL, 30 mL, Oral, Daily PRN, Rachelle Sanchezk APRN - CNP    aluminum & magnesium hydroxide-simethicone (MAALOX) 200-200-20 MG/5ML suspension 30 mL, 30 mL, Oral, PRN, Eston Humphrey Dellick, APRN - CNP, 30 mL at 11/26/22 0355    hydrOXYzine pamoate (VISTARIL) capsule 50 mg, 50 mg, Oral, TID PRN, Alvena Bradford, APRN - CNP, 50 mg at 11/25/22 2122    haloperidol (HALDOL) tablet 5 mg, 5 mg, Oral, Q6H PRN **OR** haloperidol lactate (HALDOL) injection 5 mg, 5 mg, IntraMUSCular, O6R PRN, Barbara Jones APRN - CNP    melatonin tablet 3 mg, 3 mg, Oral, Nightly, Aida CALLAHAN MARCEL JonesBERNIE - CNP, 3 mg at 11/26/22 2059      Examination:  BP (!) 92/43   Pulse 68   Temp 97.7 °F (36.5 °C) (Temporal)   Resp 16   Ht 5' 3\" (1.6 m)   Wt 132 lb 14.4 oz (60.3 kg)   LMP 10/23/2022 (Approximate) Comment: Pt. states she is due any day to start her peroid  SpO2 98%   Breastfeeding No   BMI 23.54 kg/m²   Gait - steady  Medication side effects(SE): denies    Mental Status Examination:    Level of consciousness:  within normal limits   Appearance:  fair grooming and fair hygiene  Behavior/Motor:  no abnormalities noted  Attitude toward examiner:  cooperative  Speech:  spontaneous, normal rate and normal volume   Mood: \" Im feeling better\"  Affect: Brighter  Thought processes: Linear goal directed   Thought content: Denies auditory or visual hallucinations but appears suspicious denies SI/HI intent or plan   Language: able to name objects and repeate phrases  Remote Memory: intact  Recent Memory: intact  Cognition:  oriented to person, place, and time   Fund of Knowledge: Vocabulary intact, pt is aware of current events and past history  Attetion and Concentration intact  Insight limited   Judgement limited       ASSESSMENT:   Patient symptoms are:  [] Well controlled  [] Improving  [] Worsening  [x] No change      Diagnosis:   Principal Problem:    Acute psychosis ([facility] [address]  Resolved Problems:    [facility]      LABS:    No results for input(s): WBC, HGB, PLT in the last 72 hours. No results for input(s): NA, K, CL, CO2, BUN, CREATININE, GLUCOSE in the last 72 hours. No results for input(s): BILITOT, ALKPHOS, AST, ALT in the last 72 hours.     Lab Results   Component Value Date/Time    LABAMPH NOT DETECTED 11/22/2022 08:13 PM    BARBSCNU NOT DETECTED 11/22/2022 08:13 PM    LABBENZ NOT DETECTED 11/22/2022 08:13 PM    CANNAB POSITIVE 05/20/2013 11:45 PM    COCAINESCRN NOT DETECTED 05/20/2013 11:45 PM    LABMETH NOT DETECTED 11/22/2022 08:13 PM    OPIATESCREENURINE NOT DETECTED 11/22/2022 08:13 PM    PHENCYCLIDINESCREENURINE NOT DETECTED 11/22/2022 08:13 PM    ETOH <10 11/22/2022 08:13 PM     Lab Results   Component Value Date/Time    TSH 1.030 02/17/2015 04:10 PM     No results found for: LITHIUM  No results found for: VALPROATE, CBMZ      Treatment Plan:  Reviewed current Medications with the patient. Risks, benefits, side effects, drug-to-drug interactions and alternatives to treatment were discussed. Collateral information:   CD evaluation  Encourage patient to attend group and other milieu activities.   Discharge planning discussed with the patient and treatment team.    Increase Zyprexa 10 mg at bedtime  Start Trileptal 150 mg twice daily for mood stabilization    PSYCHOTHERAPY/COUNSELING:  [x] Therapeutic interview  [x] Supportive  [] CBT  [] Ongoing  [] Other    [x] Patient continues to need, on a daily basis, active treatment furnished directly by or requiring the supervision of inpatient psychiatric personnel      Anticipated Length of stay: 3 to 7 days based on stability            Electronically signed by BARBY Richard CNP on 11/27/2022 at 11:24 AM

## 2022-11-27 NOTE — GROUP NOTE
Group Therapy Note    Date: 11/27/2022    Group Start Time: 1100  Group End Time: 1836  Group Topic: Cognitive Skills    [facility] ACUTE BH 1    PRE[facility] [place], [facility]        Group Therapy Note    Attendees: 13       Patient's Goal:  pt will be able to identify the difference between porous, rigid and healthy boundaries and the boundaries in place in current relationships. Notes:  pt sat outside of the group and did not participate or make connections.      Status After Intervention:  Unchanged    Participation Level: Minimal    Participation Quality: Resistant      Speech:  mute      Thought Process/Content: Logical      Affective Functioning: Flat      Mood: anxious      Level of consciousness:  Inattentive      Response to Learning: Resistant      Endings: None Reported    Modes of Intervention: Education, Support, Socialization, Exploration, Clarifying, and Problem-solving      Discipline Responsible: /Counselor      Signature:  PREM [place]

## 2022-11-27 NOTE — BH NOTE
Pt denies suicidal / homicidal ideations. Pt denies hallucinations. Pt cooperative. Has a flat affect, poverty of speech. Mostly isolative to self. Will follow and monitor.

## 2022-11-27 NOTE — PLAN OF CARE
Problem: Coping  Goal: Pt/Family able to verbalize concerns and demonstrate effective coping strategies  Description: INTERVENTIONS:  1. Assist patient/family to identify coping skills, available support systems and cultural and spiritual values  2. Provide emotional support, including active listening and acknowledgement of concerns of patient and caregivers  3. Reduce environmental stimuli, as able  4. Instruct patient/family in relaxation techniques, as appropriate  5. Assess for spiritual pain/suffering and initiate Spiritual Care, Psychosocial Clinical Specialist consults as needed  11/27/2022 1252 by Amparo Villa RN  Outcome: Progressing  Flowsheets (Taken 11/27/2022 1249)  Patient/family able to verbalize anxieties, fears, and concerns, and demonstrate effective coping: Assist patient/family to identify coping skills, available support systems and cultural and spiritual values  11/27/2022 0751 by Abimael Nayak RN  Outcome: Progressing     Problem: Anxiety  Goal: Will report anxiety at manageable levels  Description: INTERVENTIONS:  1. Administer medication as ordered  2. Teach and rehearse alternative coping skills  3.  Provide emotional support with 1:1 interaction with staff  11/27/2022 1252 by Amparo Villa RN  Outcome: Progressing  Flowsheets (Taken 11/27/2022 1249)  Will report anxiety at manageable levels: Administer medication as ordered  11/27/2022 0751 by Abimael Nayak RN  Outcome: Progressing

## 2022-11-27 NOTE — BH NOTE
Pt denies suicidal / homicidal ideations. Pt denies hallucinations. Pt is without distress. Has a flat affect. Mostly isolative to room, not interactive with peers. Will follow and monitor.

## 2022-11-28 VITALS
BODY MASS INDEX: 23.55 KG/M2 | SYSTOLIC BLOOD PRESSURE: 114 MMHG | WEIGHT: 132.9 LBS | DIASTOLIC BLOOD PRESSURE: 67 MMHG | HEIGHT: 63 IN | TEMPERATURE: 98.9 F | HEART RATE: 79 BPM | RESPIRATION RATE: 14 BRPM | OXYGEN SATURATION: 99 %

## 2022-11-28 PROCEDURE — 6370000000 HC RX 637 (ALT 250 FOR IP): Performed by: NURSE PRACTITIONER

## 2022-11-28 RX ORDER — OLANZAPINE 10 MG/1
10 TABLET ORAL NIGHTLY
Qty: 30 TABLET | Refills: 0 | Status: SHIPPED | OUTPATIENT
Start: 2022-11-28 | End: 2022-12-28

## 2022-11-28 RX ORDER — OXCARBAZEPINE 300 MG/1
300 TABLET, FILM COATED ORAL 2 TIMES DAILY
Qty: 60 TABLET | Refills: 0 | Status: SHIPPED | OUTPATIENT
Start: 2022-11-28 | End: 2022-12-28

## 2022-11-28 RX ORDER — LANOLIN ALCOHOL/MO/W.PET/CERES
3 CREAM (GRAM) TOPICAL NIGHTLY
Qty: 30 TABLET | Refills: 0 | Status: SHIPPED | OUTPATIENT
Start: 2022-11-28 | End: 2022-12-28

## 2022-11-28 RX ADMIN — OXCARBAZEPINE 300 MG: 300 TABLET, FILM COATED ORAL at 09:54

## 2022-11-28 NOTE — DISCHARGE SUMMARY
DISCHARGE SUMMARY      Patient ID:  Tarun Bradford  [id number]  73 y.o.  1995    Admit date: 2022    Discharge date and time: 2022    Admitting Physician: Idania Carranza MD     Discharge Physician: Dr Luz Cross MD    Discharge Diagnoses:   Patient Active Problem List   Diagnosis    Tumors of body of uterus, antepartum condition or complication    Encounter for fetal anatomic survey    29 weeks gestation of pregnancy    Normal pregnancy, unspecified trimester    Acute psychosis ([facility]       Admission Condition: poor    Discharged Condition: stable    Admission Circumstance:  presented to the ED sent in by her  from [facility] to be evaluated for anxiety reporting that patient believes her kids might be possessed.       PAST MEDICAL/PSYCHIATRIC HISTORY:   Past Medical History:   Diagnosis Date    Abnormal Pap smear of cervix     in  - precancerous cells    Allergic     seasonal    Bipolar 1 disorder ([facility] 2022    Emotional disorder     Rh sensitized     Traumatic injury during pregnancy, third trimester        FAMILY/SOCIAL HISTORY:  Family History   Problem Relation Age of Onset    Hypertension Mother     Hypertension Father      Social History     Socioeconomic History    Marital status: Single     Spouse name: Not on file    Number of children: 3    Years of education: 12    Highest education level: Not on file   Occupational History    Not on file   Tobacco Use    Smoking status: Former     Types: Cigarettes     Quit date: 1/3/2015     Years since quittin.9    Smokeless tobacco: Never   Vaping Use    Vaping Use: Never used   Substance and Sexual Activity    Alcohol use: No     Comment: before pregnant    Drug use: No     Comment: last used 2 years ago    Sexual activity: Yes     Partners: Male   Other Topics Concern    Not on file   Social History Narrative    Not on file     Social Determinants of Health     Financial Resource Strain: Not on file   Food Insecurity: Not on file Transportation Needs: Not on file   Physical Activity: Not on file   Stress: Not on file   Social Connections: Not on file   Intimate Partner Violence: Not on file   Housing Stability: Not on file       MEDICATIONS:    Current Facility-Administered Medications:     OXcarbazepine (TRILEPTAL) tablet 300 mg, 300 mg, Oral, BID, BARBY Ferris - CNP, 300 mg at 11/28/22 0954    OLANZapine (ZYPREXA) tablet 10 mg, 10 mg, Oral, Nightly, BARBY Sim - CNP, 10 mg at 11/27/22 2121    acetaminophen (TYLENOL) tablet 650 mg, 650 mg, Oral, N1Z PRN, BARBY Hearn - CNP    magnesium hydroxide (MILK OF MAGNESIA) 400 MG/5ML suspension 30 mL, 30 mL, Oral, Daily PRN, BARBY Hearn - CNP    aluminum & magnesium hydroxide-simethicone (MAALOX) 200-200-20 MG/5ML suspension 30 mL, 30 mL, Oral, PRN, BARBY Hearn - CNP, 30 mL at 11/26/22 0355    hydrOXYzine pamoate (VISTARIL) capsule 50 mg, 50 mg, Oral, TID PRN, BARBY Sim - CNP, 50 mg at 11/25/22 2122    haloperidol (HALDOL) tablet 5 mg, 5 mg, Oral, Q6H PRN **OR** haloperidol lactate (HALDOL) injection 5 mg, 5 mg, IntraMUSCular, E1U PRN, BARBY Hearn - CNP    melatonin tablet 3 mg, 3 mg, Oral, Nightly, Elias Jones APRBERNIE - CNP, 3 mg at 11/27/22 2121    Examination:  /67   Pulse 79   Temp 98.9 °F (37.2 °C)   Resp 14   Ht 5' 3\" (1.6 m)   Wt 132 lb 14.4 oz (60.3 kg)   LMP 10/23/2022 (Approximate) Comment: Pt. states she is due any day to start her peroid  SpO2 99%   Breastfeeding No   BMI 23.54 kg/m²   Gait - steady    HOSPITAL COURSE[de-identified]  Patient was admitted to the unit on 11/22/2022 was closely monitored for psychosis. She was evaluated and treated with Zyprexa 10 mg at bedtime and Trileptal 300 mg twice daily for mood stabilization. Medical events were insignificant and patient continued to improve on the floor. She started coming out of her room she was attending groups to socializing with peers.   She never made any suicidal statements or any suicidal gestures while in the unit. Social workers obtain confirmation patient's parents were to voicing concerns that they had the reported no safety concerns no access to any weapons. Patient never made any psychotic statements regarding her children. She states that which she said was misunderstood. She states that she is the way she talks when she says things about the kids and the devil it just means that kids are misbehaving. She states that she never believe that her children were possessed and that this is just \"the way I talk. \"  She was seen in treatment team prior to discharge she was bright pleasant see the medication is working very well for her. Treatment team felt the patient obtain the maximum benefit for her hospitalization She was set up with an outpatient mental health agency for outpatient follow-up services . At the time of discharge patient  did not show impulsive behavior. She was up on the unit she was attending groups and socializing with peers. She vehemently denied any suicidal homicidal ideations intent or plan. She was eating well and sleeping well there are no neurovegetative signs or symptoms of depression she denied any auditory visualizations. There are no overt or covert signs psychosis. She was appreciative of the help that she received here. This patient no longer meets criteria for inpatient hospitalization. No AVH or paranoid thoughts  No hopeless or worthless feeling  No active SI/HI  Appetite:  [x] Normal  [] Increased  [] Decreased    Sleep:       [x] Normal  [] Fair       [] Poor            Energy:    [x] Normal  [] Increased  [] Decreased     SI [] Present  [x] Absent  HI  []Present  [x] Absent   Aggression:  [] yes  [x] no  Patient is [x] able  [] unable to CONTRACT FOR SAFETY   Medication side effects(SE):  [x] None(Psych.  Meds.) [] Other      Mental Status Examination on discharge:    Level of consciousness:  within normal limits   Appearance:  well-appearing  Behavior/Motor:  no abnormalities noted  Attitude toward examiner:  attentive and good eye contact  Speech:  spontaneous, normal rate and normal volume   Mood: \"I feel a lot better. \"  Affect: Appropriate and pleasant  Thought processes: Linear without flight of ideas loose associations  Thought content: Devoid of any auditory visual hallucinations delusions or other perceptual normalities. Denies SI/HI intent or plan  Cognition:  oriented to person, place, and time   Concentration intact  Memory intact  Insight good   Judgement fair   Fund of Knowledge adequate      ASSESSMENT:  Patient symptoms are:  [x] Well controlled  [x] Improving  [] Worsening  [] No change    Reason for more than one antipsychotic:  [x] N/A  [] 3 Failed Monotherapy attempts (Drugs tried:)  [] Crossover to a new antipsychotic  [] Taper to Monotherapy from Polypharmacy  [] Augmentation of clozapine therapy due to treatment resistance to single therapy    Diagnosis:  Principal Problem:    Acute psychosis ([facility]  Resolved Problems:    Bipolar 1 disorder ([facility]      LABS:    No results for input(s): WBC, HGB, PLT in the last 72 hours. No results for input(s): NA, K, CL, CO2, BUN, CREATININE, GLUCOSE in the last 72 hours. No results for input(s): BILITOT, ALKPHOS, AST, ALT in the last 72 hours.   Lab Results   Component Value Date/Time    LABAMPH NOT DETECTED 11/22/2022 08:13 PM    BARBSCNU NOT DETECTED 11/22/2022 08:13 PM    LABBENZ NOT DETECTED 11/22/2022 08:13 PM    CANNAB POSITIVE 05/20/2013 11:45 PM    COCAINESCRN NOT DETECTED 05/20/2013 11:45 PM    LABMETH NOT DETECTED 11/22/2022 08:13 PM    OPIATESCREENURINE NOT DETECTED 11/22/2022 08:13 PM    PHENCYCLIDINESCREENURINE NOT DETECTED 11/22/2022 08:13 PM    ETOH <10 11/22/2022 08:13 PM     Lab Results   Component Value Date/Time    TSH 1.030 02/17/2015 04:10 PM     No results found for: LITHIUM  No results found for: VALPROATE, CBMZ    RISK ASSESSMENT AT DISCHARGE: Low risk for suicide and homicide. Treatment Plan:  Reviewed current Medications with the patient. Education provided on the complaince with treatment. Risks, benefits, side effects, drug-to-drug interactions and alternatives to treatment were discussed. Encourage patient to attend outpatient follow up appointment and therapy. Patient was advised to call the outpatient provider, visit the nearest ED or call 911 if symptoms are not manageable. Patient's family member was contacted prior to the discharge.          Medication List        START taking these medications      melatonin 3 MG Tabs tablet  Take 1 tablet by mouth nightly     OLANZapine 10 MG tablet  Commonly known as: ZYPREXA  Take 1 tablet by mouth nightly     OXcarbazepine 300 MG tablet  Commonly known as: TRILEPTAL  Take 1 tablet by mouth 2 times daily            ASK your doctor about these medications      docusate sodium 100 MG capsule  Commonly known as: COLACE  Take 1 capsule by mouth 2 times daily     ferrous sulfate 325 (65 Fe) MG tablet  Commonly known as: IRON 325  Take 1 tablet by mouth 2 times daily (with meals)     fluticasone 50 MCG/ACT nasal spray  Commonly known as: Flonase  1 spray by Each Nostril route daily     ibuprofen 800 MG tablet  Commonly known as: ADVIL;MOTRIN  Take 1 tablet by mouth every 8 hours as needed for Pain     lansinoh lanolin Crea ointment  Apply 1 Tube topically as needed for Dry Skin (nipple discomfort)     prenatal vitamin 27-1 MG Tabs tablet               Where to Get Your Medications        These medications were sent to [facility] [address], [address] [address]      Phone: [phone]   melatonin 3 MG Tabs tablet  OLANZapine 10 MG tablet  OXcarbazepine 300 MG tablet     Patient is counseled she must been compliant with all medications all outpatient follow-up appointments    Patient is discharged home in stable condition      TIME SPEND - 35 MINUTES TO COMPLETE THE EVALUATION, DISCHARGE SUMMARY, MEDICATION RECONCILIATION AND FOLLOW UP CARE     Signed:  BARBY Butler - [place]  24/75/3990  12:17 PM

## 2022-11-28 NOTE — BH NOTE
[facility]  Day 3 Interdisciplinary Treatment Plan NOTE    Review Date & Time: 11/28 1015    Patient was in treatment team    Estimated Length of Stay Update:  5-7  Estimated Discharge Date Update: 11/29    EDUCATION:   Learner Progress Toward Treatment Goals: Reviewed results and recommendations of this team, Reviewed group plan and strategies, Reviewed signs, symptoms and risk of self harm and violent behavior, and Reviewed goals and plan of care    Method: Small group    Outcome: Verbalized understanding and Demonstrated Understanding    PATIENT GOALS: prove readiness for d/c    PLAN/TREATMENT RECOMMENDATIONS UPDATE:encourage meds and groups    GOALS UPDATE:   Time frame for Short-Term Goals: 1-3      Cuate Call RN
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
## 2022-11-28 NOTE — CARE COORDINATION
SW received a call from pt mom Ashley [id number] (JESUS signed). Kate Cross reports she is aware of pt discharge. Love Christianoon expressed no concerns for pt discharge and stated her dad will be picking her up.
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
## 2022-11-28 NOTE — CARE COORDINATION
Sw contacted [facility]  and scheduled appointment for pt 11/30 at 8am for medication management in office.

## 2022-11-28 NOTE — PROGRESS NOTES
CLINICAL PHARMACY NOTE: MEDS TO BEDS    Total # of Prescriptions Filled: 3   The following medications were delivered to the patient:  Olanzapine 10 mg  Oxcarbazepine 300 mg  Melatoinin 3 mg    Additional Documentation:

## 2022-11-28 NOTE — BH NOTE
[facility]  Discharge Note    Pt discharged with followings belongings:   Dental Appliances: None  Vision - Corrective Lenses: None  Hearing Aid: None  Jewelry: None  Body Piercings Removed: No  Clothing: Footwear, Jacket/Coat  Other Valuables: Purse (patient has diapers, and brush in purse.)   Valuables returned to patient. Patient educated on aftercare instructions: yes  Patient verbalize understanding of AVS:  yes. Status EXAM upon discharge:  Mental Status and Behavioral Exam  Normal: No  Level of Assistance: Independent/Self  Facial Expression: Flat  Affect: Appropriate  Level of Consciousness: Alert  Frequency of Checks: 4 times per hour, close  Mood:Normal: No  Mood: Depressed  Motor Activity:Normal: Yes  Eye Contact: Good  Observed Behavior: Cooperative  Sexual Misconduct History: Current - no  Preception: Lawrence to person, Lawrence to time, Lawrence to place, Lawrence to situation  Attention:Normal: Yes  Thought Processes: Circumstantial  Thought Content:Normal: Yes  Thought Content: Poverty of content  Depression Symptoms: No problems reported or observed. Anxiety Symptoms: Generalized  Jessica Symptoms: No problems reported or observed.   Hallucinations: None  Delusions: No  Memory:Normal: Yes  Insight and Judgment: No  Insight and Judgment: Poor judgment, Poor insight    Tobacco Screening:  Practical Counseling, on admission, bhumika X, if applicable and completed (first 3 are required if patient doesn't refuse):            ( ) Recognizing danger situations (included triggers and roadblocks)                    ( ) Coping skills (new ways to manage stress,relaxation techniques, changing routine, distraction)                                                           ( ) Basic information about quitting (benefits of quitting, techniques in how to quit, available resources  ( ) Referral for counseling faxed to [facility] ( x) Patient refused counseling  ( x) Patient refused referral  ( x) Patient refused prescription upon discharge  ( ) Patient has not smoked in the last 30 days    Metabolic Screening:    No results found for: LABA1C    Lab Results   Component Value Date    CHOL 124 12/13/2011     Lab Results   Component Value Date    TRIG 45 12/13/2011     Lab Results   Component Value Date    HDL 35.0 (A) 12/13/2011     No components found for: LDLCAL  No results found for: Yougn Joyner RN

## 2022-11-28 NOTE — PLAN OF CARE
Pt in room coloring with markers. Pt bright, friendly and cooperative. Pt denies Si, HI and hallucinations. Pt states that she feels less anxious and better with the medications. Pt out of room for evening snack, then back to room. Problem: Coping  Goal: Pt/Family able to verbalize concerns and demonstrate effective coping strategies  Description: INTERVENTIONS:  1. Assist patient/family to identify coping skills, available support systems and cultural and spiritual values  2. Provide emotional support, including active listening and acknowledgement of concerns of patient and caregivers  3. Reduce environmental stimuli, as able  4. Instruct patient/family in relaxation techniques, as appropriate  5. Assess for spiritual pain/suffering and initiate Spiritual Care, Psychosocial Clinical Specialist consults as needed  11/27/2022 2035 by Debbie Clay RN  Outcome: Progressing     Problem: Decision Making  Goal: Pt/Family able to effectively weigh alternatives and participate in decision making related to treatment and care  Description: INTERVENTIONS:  1. Determine when there are differences between patient's view, family's view, and healthcare provider's view of condition  2. Facilitate patient and family articulation of goals for care  3. Help patient and family identify pros/cons of alternative solutions  4. Provide information as requested by patient/family  5. Respect patient/family right to receive or not to receive information  6. Serve as a liaison between patient and family and health care team  7.  Initiate Consults from Ethics, Palliative Care or initiate [address] as is appropriate  11/27/2022 2035 by Debbie Clay RN  Outcome: Progressing  [facility] (Taken 11/27/2022 1249 by Laureano Schilling RN)  Patient/family able to effectively weigh alternatives and participate in decision making related to treatment and care: Determine when there are differences between patient's view, family's view, and healthcare provider's view of condition     Problem: Behavior  Goal: Pt/Family maintain appropriate behavior and adhere to behavioral management agreement, if implemented  Description: INTERVENTIONS:  1. Assess patient/family's coping skills and  non-compliant behavior (including use of illegal substances)  2. Notify security of behavior or suspected illegal substances which indicate the need for search of the family and/or belongings  3. Encourage verbalization of thoughts and concerns in a socially appropriate manner  4. Utilize positive, consistent limit setting strategies supporting safety of patient, staff and others  5. Encourage participation in the decision making process about the behavioral management agreement  6. If a visitor's behavior poses a threat to safety call refer to organization policy.   7. Initiate consult with , Psychosocial CNS, Spiritual Care as appropriate  11/27/2022 2035 by Haydee Kyle RN  Outcome: Progressing  Flowsheets (Taken 11/27/2022 1249 by Sandy Ospina RN)  Patient/family maintains appropriate behavior and adheres to behavioral management agreement, if implemented: Assess patient/familys coping skills and  non-compliant behavior (including use of illegal substances)

## 2023-01-31 ENCOUNTER — TELEPHONE (OUTPATIENT)
Dept: ENT CLINIC | Age: 28
End: 2023-01-31

## 2023-05-18 NOTE — BH NOTE
Pt denies suicidal / homicidal ideations. Pt denies hallucinations. Pt cooperative, negative distress. Pt is mostly isolative to room. Pt appears sad / flat affect. Negative tearful episodes. Thought processes are organized. Will follow and monitor. Spoke with patient and pre op appointment has been scheduled    Surgery Date: 2/15/2021  Location: [facility]  Surgery Type: L TOTAL HIP ARTHROPLASTY   Surgeon: Dr. Mack     Service to Periop Home placed for testing.   Normal

## 2023-07-15 ENCOUNTER — HOSPITAL ENCOUNTER (EMERGENCY)
Age: 28
Discharge: HOME | End: 2023-07-16
Attending: EMERGENCY MEDICINE
Payer: COMMERCIAL

## 2023-07-15 DIAGNOSIS — J45.20 MILD INTERMITTENT REACTIVE AIRWAY DISEASE WITHOUT COMPLICATION: Primary | ICD-10-CM

## 2023-07-15 PROCEDURE — 99283 EMERGENCY DEPT VISIT LOW MDM: CPT

## 2023-07-15 PROCEDURE — 6370000000 HC RX 637 (ALT 250 FOR IP): Performed by: EMERGENCY MEDICINE

## 2023-07-15 RX ORDER — PREDNISONE 20 MG/1
40 TABLET ORAL ONCE
Status: COMPLETED | OUTPATIENT
Start: 2023-07-15 | End: 2023-07-15

## 2023-07-15 RX ORDER — IPRATROPIUM BROMIDE AND ALBUTEROL SULFATE 2.5; .5 MG/3ML; MG/3ML
1 SOLUTION RESPIRATORY (INHALATION) ONCE
Status: COMPLETED | OUTPATIENT
Start: 2023-07-15 | End: 2023-07-15

## 2023-07-15 RX ADMIN — PREDNISONE 40 MG: 20 TABLET ORAL at 23:38

## 2023-07-15 RX ADMIN — IPRATROPIUM BROMIDE AND ALBUTEROL SULFATE 1 DOSE: .5; 3 SOLUTION RESPIRATORY (INHALATION) at 23:45

## 2023-07-16 ENCOUNTER — APPOINTMENT (OUTPATIENT)
Dept: GENERAL RADIOLOGY | Age: 28
End: 2023-07-16
Payer: COMMERCIAL

## 2023-07-16 VITALS
OXYGEN SATURATION: 100 % | RESPIRATION RATE: 16 BRPM | DIASTOLIC BLOOD PRESSURE: 78 MMHG | TEMPERATURE: 98.1 F | HEART RATE: 89 BPM | SYSTOLIC BLOOD PRESSURE: 138 MMHG

## 2023-07-16 PROCEDURE — 71046 X-RAY EXAM CHEST 2 VIEWS: CPT

## 2023-07-16 RX ORDER — ALBUTEROL SULFATE 90 UG/1
2 AEROSOL, METERED RESPIRATORY (INHALATION) EVERY 6 HOURS PRN
Qty: 18 G | Refills: 0 | Status: ON HOLD | OUTPATIENT
Start: 2023-07-16

## 2023-07-16 RX ORDER — NEBULIZER ACCESSORIES
1 KIT MISCELLANEOUS DAILY PRN
Qty: 1 KIT | Refills: 0 | Status: SHIPPED | OUTPATIENT
Start: 2023-07-16 | End: 2023-07-16 | Stop reason: SDUPTHER

## 2023-07-16 RX ORDER — PREDNISONE 10 MG/1
40 TABLET ORAL DAILY
Qty: 20 TABLET | Refills: 0 | Status: SHIPPED | OUTPATIENT
Start: 2023-07-16 | End: 2023-07-21

## 2023-07-16 RX ORDER — ALBUTEROL SULFATE 2.5 MG/3ML
2.5 SOLUTION RESPIRATORY (INHALATION) EVERY 6 HOURS PRN
Qty: 120 EACH | Refills: 3 | Status: ON HOLD | OUTPATIENT
Start: 2023-07-16

## 2023-07-16 RX ORDER — NEBULIZER ACCESSORIES
1 KIT MISCELLANEOUS DAILY PRN
Qty: 1 KIT | Refills: 0 | Status: ON HOLD | OUTPATIENT
Start: 2023-07-16

## 2023-07-16 ASSESSMENT — LIFESTYLE VARIABLES: HOW OFTEN DO YOU HAVE A DRINK CONTAINING ALCOHOL: NEVER

## 2023-07-16 ASSESSMENT — PAIN - FUNCTIONAL ASSESSMENT: PAIN_FUNCTIONAL_ASSESSMENT: NONE - DENIES PAIN

## 2023-07-17 ENCOUNTER — TELEPHONE (OUTPATIENT)
Dept: ENT CLINIC | Age: 28
End: 2023-07-17

## 2023-07-17 NOTE — TELEPHONE ENCOUNTER
Per provider patient has had 5 appointments in 11 months that have either been no shows or same day cancels patient is to not be rescheduled.

## 2023-07-17 NOTE — TELEPHONE ENCOUNTER
Patient had to cancel the appointment due to child being sick this morning. She si asking to reschedule the ed follow up. No soon appointments available. Please contact patient to reschedule. She is also requesting an afternoon appointment.  Thank you

## 2023-07-22 ENCOUNTER — HOSPITAL ENCOUNTER (EMERGENCY)
Age: 28
Discharge: HOME | DRG: 751 | End: 2023-07-22
Attending: STUDENT IN AN ORGANIZED HEALTH CARE EDUCATION/TRAINING PROGRAM
Payer: COMMERCIAL

## 2023-07-22 VITALS
HEART RATE: 100 BPM | OXYGEN SATURATION: 100 % | SYSTOLIC BLOOD PRESSURE: 102 MMHG | RESPIRATION RATE: 16 BRPM | TEMPERATURE: 97.7 F | DIASTOLIC BLOOD PRESSURE: 60 MMHG

## 2023-07-22 DIAGNOSIS — H65.03 NON-RECURRENT ACUTE SEROUS OTITIS MEDIA OF BOTH EARS: Primary | ICD-10-CM

## 2023-07-22 PROCEDURE — 99283 EMERGENCY DEPT VISIT LOW MDM: CPT

## 2023-07-22 RX ORDER — LORATADINE 10 MG/1
10 TABLET ORAL DAILY
Qty: 30 TABLET | Refills: 0 | Status: SHIPPED | OUTPATIENT
Start: 2023-07-22

## 2023-07-22 NOTE — ED NOTES
NP and nurse both looked in patients ears, nothing was found in bilateral ear canals, patient did have bilateral tympanic effusions. Patient states that she feels like a \"bead is behind my ear and has moved down into my neck\". Made aware that it is physically impossible for a bead to move from ear canal, through intact tympanic membranes and into inner ear. Patient reports that it is possible. Dr Lissa Hamman to go in room and assess.       Ashlyn Santiago RN  07/22/23 1946

## 2023-07-23 ENCOUNTER — APPOINTMENT (OUTPATIENT)
Dept: CT IMAGING | Age: 28
End: 2023-07-23
Payer: COMMERCIAL

## 2023-07-23 ENCOUNTER — HOSPITAL ENCOUNTER (EMERGENCY)
Age: 28
Discharge: ANOTHER HOSPITAL | End: 2023-07-23
Attending: STUDENT IN AN ORGANIZED HEALTH CARE EDUCATION/TRAINING PROGRAM
Payer: COMMERCIAL

## 2023-07-23 ENCOUNTER — HOSPITAL ENCOUNTER (INPATIENT)
Age: 28
LOS: 5 days | Discharge: HOME | DRG: 751 | End: 2023-07-28
Attending: PSYCHIATRY & NEUROLOGY | Admitting: PSYCHIATRY & NEUROLOGY
Payer: COMMERCIAL

## 2023-07-23 VITALS
BODY MASS INDEX: 25.61 KG/M2 | OXYGEN SATURATION: 100 % | HEART RATE: 93 BPM | DIASTOLIC BLOOD PRESSURE: 78 MMHG | RESPIRATION RATE: 16 BRPM | TEMPERATURE: 98 F | SYSTOLIC BLOOD PRESSURE: 107 MMHG | WEIGHT: 150 LBS | HEIGHT: 64 IN

## 2023-07-23 DIAGNOSIS — F23 ACUTE PSYCHOSIS (HCC): Primary | ICD-10-CM

## 2023-07-23 PROBLEM — F31.9 BIPOLAR 1 DISORDER (HCC): Status: ACTIVE | Noted: 2023-07-23

## 2023-07-23 LAB
ALBUMIN SERPL-MCNC: 4.3 G/DL (ref 3.5–5.2)
ALP SERPL-CCNC: 86 U/L (ref 35–104)
ALT SERPL-CCNC: 13 U/L (ref 0–32)
AMPHET UR QL SCN: NEGATIVE
ANION GAP SERPL CALCULATED.3IONS-SCNC: 10 MMOL/L (ref 7–16)
APAP SERPL-MCNC: <5 UG/ML (ref 10–30)
AST SERPL-CCNC: 17 U/L (ref 0–31)
BACTERIA URNS QL MICRO: ABNORMAL
BARBITURATES UR QL SCN: NEGATIVE
BASOPHILS # BLD: 0.02 K/UL (ref 0–0.2)
BASOPHILS NFR BLD: 0 % (ref 0–2)
BENZODIAZ UR QL: NEGATIVE
BILIRUB SERPL-MCNC: <0.2 MG/DL (ref 0–1.2)
BILIRUB UR QL STRIP: NEGATIVE
BUN SERPL-MCNC: 14 MG/DL (ref 6–20)
BUPRENORPHINE UR QL: NEGATIVE
CALCIUM SERPL-MCNC: 9.1 MG/DL (ref 8.6–10.2)
CANNABINOIDS UR QL SCN: NEGATIVE
CHLORIDE SERPL-SCNC: 103 MMOL/L (ref 98–107)
CLARITY UR: CLEAR
CO2 SERPL-SCNC: 25 MMOL/L (ref 22–29)
COCAINE UR QL SCN: NEGATIVE
COLOR UR: YELLOW
CREAT SERPL-MCNC: 0.7 MG/DL (ref 0.5–1)
EKG ATRIAL RATE: 88 BPM
EKG P AXIS: 69 DEGREES
EKG P-R INTERVAL: 148 MS
EKG Q-T INTERVAL: 350 MS
EKG QRS DURATION: 76 MS
EKG QTC CALCULATION (BAZETT): 423 MS
EKG R AXIS: 39 DEGREES
EKG T AXIS: 31 DEGREES
EKG VENTRICULAR RATE: 88 BPM
EOSINOPHIL # BLD: 0.11 K/UL (ref 0.05–0.5)
EOSINOPHILS RELATIVE PERCENT: 2 % (ref 0–6)
EPI CELLS #/AREA URNS HPF: ABNORMAL /HPF
ERYTHROCYTE [DISTWIDTH] IN BLOOD BY AUTOMATED COUNT: 13.3 % (ref 11.5–15)
ETHANOLAMINE SERPL-MCNC: <10 MG/DL
FENTANYL UR QL: NEGATIVE
GFR SERPL CREATININE-BSD FRML MDRD: >60 ML/MIN/1.73M2
GLUCOSE SERPL-MCNC: 96 MG/DL (ref 74–99)
GLUCOSE UR STRIP-MCNC: NEGATIVE MG/DL
HCG, URINE, POC: NEGATIVE
HCT VFR BLD AUTO: 35.8 % (ref 34–48)
HGB BLD-MCNC: 11.7 G/DL (ref 11.5–15.5)
HGB UR QL STRIP.AUTO: NEGATIVE
IMM GRANULOCYTES # BLD AUTO: <0.03 K/UL (ref 0–0.58)
IMM GRANULOCYTES NFR BLD: 0 % (ref 0–5)
KETONES UR STRIP-MCNC: 15 MG/DL
LEUKOCYTE ESTERASE UR QL STRIP: NEGATIVE
LYMPHOCYTES NFR BLD: 2.63 K/UL (ref 1.5–4)
LYMPHOCYTES RELATIVE PERCENT: 48 % (ref 20–42)
Lab: NORMAL
MCH RBC QN AUTO: 29.9 PG (ref 26–35)
MCHC RBC AUTO-ENTMCNC: 32.7 G/DL (ref 32–34.5)
MCV RBC AUTO: 91.6 FL (ref 80–99.9)
METHADONE UR QL: NEGATIVE
MONOCYTES NFR BLD: 0.46 K/UL (ref 0.1–0.95)
MONOCYTES NFR BLD: 8 % (ref 2–12)
MUCOUS THREADS URNS QL MICRO: PRESENT
NEGATIVE QC PASS/FAIL: NORMAL
NEUTROPHILS NFR BLD: 41 % (ref 43–80)
NEUTS SEG NFR BLD: 2.26 K/UL (ref 1.8–7.3)
NITRITE UR QL STRIP: NEGATIVE
OPIATES UR QL SCN: NEGATIVE
OXYCODONE UR QL SCN: NEGATIVE
PCP UR QL SCN: NEGATIVE
PH UR STRIP: 6 [PH] (ref 5–9)
PLATELET # BLD AUTO: 244 K/UL (ref 130–450)
PMV BLD AUTO: 11.7 FL (ref 7–12)
POSITIVE QC PASS/FAIL: NORMAL
POTASSIUM SERPL-SCNC: 4.2 MMOL/L (ref 3.5–5)
PROT SERPL-MCNC: 7.6 G/DL (ref 6.4–8.3)
PROT UR STRIP-MCNC: NEGATIVE MG/DL
RBC # BLD AUTO: 3.91 M/UL (ref 3.5–5.5)
RBC #/AREA URNS HPF: ABNORMAL /HPF
SALICYLATES SERPL-MCNC: <0.3 MG/DL (ref 0–30)
SODIUM SERPL-SCNC: 138 MMOL/L (ref 132–146)
SP GR UR STRIP: >1.03 (ref 1–1.03)
TEST INFORMATION: NORMAL
TOXIC TRICYCLIC SC,BLOOD: NEGATIVE
UROBILINOGEN UR STRIP-ACNC: 0.2 EU/DL (ref 0–1)
WBC #/AREA URNS HPF: ABNORMAL /HPF
WBC OTHER # BLD: 5.5 K/UL (ref 4.5–11.5)

## 2023-07-23 PROCEDURE — 93010 ELECTROCARDIOGRAM REPORT: CPT | Performed by: INTERNAL MEDICINE

## 2023-07-23 PROCEDURE — 6370000000 HC RX 637 (ALT 250 FOR IP): Performed by: PSYCHIATRY & NEUROLOGY

## 2023-07-23 PROCEDURE — 80179 DRUG ASSAY SALICYLATE: CPT

## 2023-07-23 PROCEDURE — 1240000000 HC EMOTIONAL WELLNESS R&B

## 2023-07-23 PROCEDURE — G0480 DRUG TEST DEF 1-7 CLASSES: HCPCS

## 2023-07-23 PROCEDURE — 93005 ELECTROCARDIOGRAM TRACING: CPT | Performed by: STUDENT IN AN ORGANIZED HEALTH CARE EDUCATION/TRAINING PROGRAM

## 2023-07-23 PROCEDURE — 80183 DRUG SCRN QUANT OXCARBAZEPIN: CPT

## 2023-07-23 PROCEDURE — 80143 DRUG ASSAY ACETAMINOPHEN: CPT

## 2023-07-23 PROCEDURE — 70450 CT HEAD/BRAIN W/O DYE: CPT

## 2023-07-23 PROCEDURE — 80053 COMPREHEN METABOLIC PANEL: CPT

## 2023-07-23 PROCEDURE — 80307 DRUG TEST PRSMV CHEM ANLYZR: CPT

## 2023-07-23 PROCEDURE — 99285 EMERGENCY DEPT VISIT HI MDM: CPT

## 2023-07-23 PROCEDURE — 81001 URINALYSIS AUTO W/SCOPE: CPT

## 2023-07-23 PROCEDURE — 85027 COMPLETE CBC AUTOMATED: CPT

## 2023-07-23 RX ORDER — LORAZEPAM 2 MG/ML
2 INJECTION INTRAMUSCULAR ONCE
Status: DISCONTINUED | OUTPATIENT
Start: 2023-07-23 | End: 2023-07-23 | Stop reason: HOSPADM

## 2023-07-23 RX ORDER — HALOPERIDOL 5 MG/ML
5 INJECTION INTRAMUSCULAR ONCE
Status: DISCONTINUED | OUTPATIENT
Start: 2023-07-23 | End: 2023-07-23 | Stop reason: HOSPADM

## 2023-07-23 RX ORDER — MEDROXYPROGESTERONE ACETATE 150 MG/ML
150 INJECTION, SUSPENSION INTRAMUSCULAR
COMMUNITY

## 2023-07-23 RX ORDER — DIPHENHYDRAMINE HYDROCHLORIDE 50 MG/ML
50 INJECTION INTRAMUSCULAR; INTRAVENOUS ONCE
Status: DISCONTINUED | OUTPATIENT
Start: 2023-07-23 | End: 2023-07-23 | Stop reason: HOSPADM

## 2023-07-23 RX ORDER — ACETAMINOPHEN 325 MG/1
650 TABLET ORAL EVERY 4 HOURS PRN
Status: DISCONTINUED | OUTPATIENT
Start: 2023-07-23 | End: 2023-07-28 | Stop reason: HOSPADM

## 2023-07-23 RX ADMIN — ACETAMINOPHEN 650 MG: 325 TABLET ORAL at 21:16

## 2023-07-23 ASSESSMENT — SLEEP AND FATIGUE QUESTIONNAIRES
DO YOU USE A SLEEP AID: NO
DO YOU HAVE DIFFICULTY SLEEPING: NO
DO YOU USE A SLEEP AID: NO
DO YOU HAVE DIFFICULTY SLEEPING: NO
AVERAGE NUMBER OF SLEEP HOURS: 8
AVERAGE NUMBER OF SLEEP HOURS: 8

## 2023-07-23 ASSESSMENT — PAIN SCALES - GENERAL: PAINLEVEL_OUTOF10: 10

## 2023-07-23 ASSESSMENT — PAIN - FUNCTIONAL ASSESSMENT
PAIN_FUNCTIONAL_ASSESSMENT: NONE - DENIES PAIN
PAIN_FUNCTIONAL_ASSESSMENT: ACTIVITIES ARE NOT PREVENTED

## 2023-07-23 ASSESSMENT — LIFESTYLE VARIABLES
HOW OFTEN DO YOU HAVE A DRINK CONTAINING ALCOHOL: NEVER
HOW MANY STANDARD DRINKS CONTAINING ALCOHOL DO YOU HAVE ON A TYPICAL DAY: PATIENT DOES NOT DRINK
HOW OFTEN DO YOU HAVE A DRINK CONTAINING ALCOHOL: NEVER
HOW MANY STANDARD DRINKS CONTAINING ALCOHOL DO YOU HAVE ON A TYPICAL DAY: PATIENT DOES NOT DRINK
HOW MANY STANDARD DRINKS CONTAINING ALCOHOL DO YOU HAVE ON A TYPICAL DAY: PATIENT DOES NOT DRINK
HOW OFTEN DO YOU HAVE A DRINK CONTAINING ALCOHOL: NEVER

## 2023-07-23 ASSESSMENT — PAIN DESCRIPTION - DESCRIPTORS: DESCRIPTORS: ACHING

## 2023-07-23 ASSESSMENT — PAIN DESCRIPTION - LOCATION: LOCATION: EAR

## 2023-07-23 ASSESSMENT — PAIN DESCRIPTION - ORIENTATION: ORIENTATION: RIGHT

## 2023-07-23 NOTE — ED NOTES
[place] slip faxed to [facility]FFIL[facility]ATE OF [facility]      Glenn Barber RN  07/23/23 [address] Nelly Cueto RN  07/23/23 7005

## 2023-07-23 NOTE — ED NOTES
Report given to [facility] RN and [facility] - BRANNON Jauregui, LOREN  07/23/23 7214

## 2023-07-23 NOTE — ED NOTES
Discussed case with Dr. David Govea / Jaida Gill NP Patient to be accepted to [address] [facility]  [address] diagnosis of Bipolar 1 with psychotic features. Please ensure original pink slip / voluntary admission accompanies patient's chart. Ju MADRID to inform primary care RN of further admission details once bed is available.         Annie Gutierrez RN  07/23/23 3143

## 2023-07-23 NOTE — ED NOTES
[facility] [address] Malloy  [facility] [address]  [phone]  ETA [address]     [facility]  07/23/23 1011

## 2023-07-23 NOTE — ED NOTES
Call placed and message left with Veronica Marie NP/ Dr. Lyssa Lyons to present patient for admission. Awaiting a return phone call.       Huma Stone RN  07/23/23 5371
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
## 2023-07-23 NOTE — ED NOTES
PAS at bedside for transfer. Pt cooperative and still questioning why she is pink slipped and the need for transfer. RN explained again why pt is pink slipped and what that means. RN also again informed pt why transfer was occurring. Report given to paramedic updated on pt condition and plan of care. Pt walked cooperatively to stretcher. Pt belongings given to EMS and pt informed all her belongings were with her.      Dottie Deluna RN  07/23/23 8513

## 2023-07-23 NOTE — BH NOTE
[facility]  Admission Note     Admission Type: Involuntary       Reason for admission:Bipolar 1         Addictive Behavior:   Addictive Behavior  In the Past 3 Months, Have You Felt or Has Someone Told You That You Have a Problem With  : None    Medical Problems:   Past Medical History:   Diagnosis Date    Abnormal Pap smear of cervix     in 2021 - precancerous cells    Allergic     seasonal    Bipolar 1 disorder ([address] [facility]) 11/23/2022    Emotional disorder     Rh sensitized     Traumatic injury during pregnancy, third trimester        Status EXAM:  Mental Status and Behavioral Exam  Normal: No  Level of Assistance: Independent/Self  Facial Expression: Flat  Affect: Blunt  Level of Consciousness: Alert  Frequency of Checks: 4 times per hour, close  Mood:Normal: No  Mood: Anxious, Suspicious  Motor Activity:Normal: Yes  Eye Contact: Fair  Observed Behavior: Withdrawn, Cooperative, Guarded  Sexual Misconduct History: Current - no  Preception: [place] to person, [place] to time, [place] to place, [place] to situation  Attention:Normal: Yes  Attention: Others (comment)  Thought Processes: Blocking  Thought Content:Normal: No  Thought Content: Preoccupations  Depression Symptoms: No problems reported or observed. Anxiety Symptoms: Generalized  Jessica Symptoms: No problems reported or observed.   Hallucinations: None  Delusions: No  Memory:Normal: Yes  Insight and Judgment: No  Insight and Judgment: Poor judgment, Poor insight    Tobacco Screening:  Practical Counseling, on admission, bhumika X, if applicable and completed (first 3 are required if patient doesn't refuse)yes:            ( ) Recognizing danger situations (included triggers and roadblocks)                    ( ) Coping skills (new ways to manage stress,relaxation techniques, changing routine, distraction)                                                           ( ) Basic information about quitting (benefits of quitting, techniques in how to quit,

## 2023-07-23 NOTE — ED NOTES
Patient arrived to facility with their 3 kids. Patient is at this time, unstable to care for children as patient is hallucinating. This RN attempted to call grandparents to come  children. No answer from grandparents. This RN then called CPS and talked with supervisor at [phone] [facility]). Supervisor states that they will continue to call grandfather to let them know they need to come get grandchildren. Patient made aware of this.       Leoncio See RN  07/23/23 7423

## 2023-07-23 NOTE — ED NOTES
Pt is calm and cooperative when this RN obtains blood via straight stick. Pt denies any SI/HI at this time. Pt is asking if she can have dry ears and irritation from washing her ears out. Pt then asks if there is a radiologist here and she informed that there is not one here and then she asks if the [address] [facility] has one. This RN informs her that he does not know if there is on as he does not work there. Pt is calm and cooperative during the blood draw and during triage. Pt has a flat affect and has 3 children with her in the ED. Pt is in room and calm and cooperative at this time.      Cyndee Amaro RN  07/23/23 2407

## 2023-07-23 NOTE — PLAN OF CARE
Problem: Self Harm/Suicidality  Goal: Will have no self-injury during hospital stay  Description: INTERVENTIONS:  1. Ensure constant observer at bedside with Q15M safety checks  2. Maintain a safe environment  3. Secure patient belongings  4. Ensure family/visitors adhere to safety recommendations  5. Ensure safety tray has been added to patient's diet order  6. Every shift and PRN: Re-assess suicidal risk via Frequent Screener    Outcome: Progressing     Problem: Depression  Goal: Will be euthymic at discharge  Description: INTERVENTIONS:  1. Administer medication as ordered  2. Provide emotional support via 1:1 interaction with staff  3. Encourage involvement in milieu/groups/activities  4. Monitor for social isolation  Outcome: Progressing     Problem: Jessica  Goal: Will exhibit normal sleep and speech and no impulsivity  Description: INTERVENTIONS:  1. Administer medication as ordered  2. Set limits on impulsive behavior  3.  Make attempts to decrease external stimuli as possible  Outcome: Progressing

## 2023-07-23 NOTE — ED NOTES
Report received from Aramis PIMENTEL. Pt standing at door calm and cooperative at this time. Pt informed waiting for tele health to talk with pt. Belonging at nurses station ligature risk removed pt in safety gown and CO at bedside.       Mita Verduzoc RN  07/23/23 2917

## 2023-07-23 NOTE — ED NOTES
Explained to patient again about reasoning for pink slip. Patient states, \"I am not crazy, y'all are keeping me here for no reason. My baby dad told me I have micro chips in my ear and I needed to get it checked out. Y'all are doing this for no reason it's crazy. \"      Vinita Rea RN  07/23/23 2967

## 2023-07-23 NOTE — PROGRESS NOTES
Patient attended afternoon recreation activity facilitated by CTRS, aurelio to aurelio  Group time [id number]  Patient calm and cooperative. Patient was 1 of 7 in attendance.

## 2023-07-23 NOTE — ED NOTES
Cindy LAUREANO called, and will come down to the [address] [place] [address], RN  07/22/23 2036

## 2023-07-23 NOTE — ED NOTES
Pt questioning why she needed to be transferred and why she is pink slipped . RN and charge nurse both explained to pt why she was pink slipped and why she was to be transferred.       Melba Culver, RN  07/23/23 9231

## 2023-07-23 NOTE — ED NOTES
Sitter at bedside. Belongings taken from patient and placed by charge desk, paper gown and pants provided to patient.  Patient expressing that they do not understand why they are pink slipped, this RN along with physician explained multiple times to patient as to why.      Leoncio See RN  07/23/23 7174

## 2023-07-23 NOTE — ED NOTES
Per DENISSE RN, Patient has been accepted Dr. Leana Lopez to [address] . Patient will go to bed [phone] , Please call before patient leaves the ED    PAS  N Varun Cueto in admitting is aware of bed assignment     PREM Troy, LSW  07/23/23 1010

## 2023-07-23 NOTE — GROUP NOTE
Group Therapy Note    Date: 7/23/2023    Group Start Time: 1400  Group End Time: 8920  Group Topic: Cognitive Skills    [facility] 7W ACUTE BH 2    PREM Gonzalez, [facility]        Group Therapy Note    Attendees: 8       Patient's Goal:  pt will be able to identify positive things that have happened in their lives recently and ways that they impact others. Notes:  Pt participated in group and made connections. Status After Intervention:  Improved    Participation Level:  Active Listener and Interactive    Participation Quality: Appropriate, Attentive, and Sharing      Speech:  normal      Thought Process/Content: Logical  Linear      Affective Functioning: Congruent      Mood: anxious      Level of consciousness:  Alert, Oriented x4, and Attentive      Response to Learning: Able to verbalize current knowledge/experience, Able to verbalize/acknowledge new learning, Able to retain information, and Capable of insight      Endings: None Reported    Modes of Intervention: Education, Support, Socialization, Exploration, Clarifying, and Problem-solving      Discipline Responsible: /Counselor      Signature:  PREM Gonzalez, [place]

## 2023-07-23 NOTE — ED NOTES
Patient became Naun Self in room when doctor looked in ear and also didn't see any beads within the ear canal. Started going through trash trying to shove things into her ears \"to get it out myself\", insistent that staff do a scope behind tympanic membrane to see bead. Children where running around in room, one child was sucking on a iodine packet, the other was sucking on a packet of lube. The third child was in car seat crying and scared of situation. Police in room to help physically escort patient off property do to being discharge. In parking lot she did not watch children at all. Two children ran out into traffic in the parking lot, then when putting children in car she put child in car seat in car, other helped himself in the car, and she got in and turned car on. Third child was hanging onto the outside of the back of the car. Patient was getting ready to put car in reverse when police and nurse yelled for her to stop. Child then crawled into car. ALL THREE children where not put into any type of car restraint. No car seat, no belt buckle (all ages of carseat). Child protective services called.       Antonia Jarquin RN  07/22/23 2031

## 2023-07-23 NOTE — ED NOTES
Nurse to Nurse called to [facility] on Room 7308-B. Updated on pt condition and plan of care all questions answered PAS 11:00.       Dottie Deluna RN  07/23/23 9049

## 2023-07-23 NOTE — ED NOTES
Grandfather arrived to  patient's children and was explained process of pink slip. No questions from grandfather at this time. CPS supervisor aware.       Willy Junior RN  07/23/23 [address] [place] [address], RN  07/23/23 8302

## 2023-07-23 NOTE — PROGRESS NOTES
Pt. Belongings placed in [address] [place] [address] number 13.     Florentino Villavicencio, [facility]  07/23/23  1120

## 2023-07-24 PROBLEM — F29 PSYCHOTIC DISORDER (HCC): Status: ACTIVE | Noted: 2022-11-23

## 2023-07-24 PROBLEM — F31.9 BIPOLAR 1 DISORDER (HCC): Status: RESOLVED | Noted: 2023-07-23 | Resolved: 2023-07-24

## 2023-07-24 PROCEDURE — 1240000000 HC EMOTIONAL WELLNESS R&B

## 2023-07-24 PROCEDURE — 90792 PSYCH DIAG EVAL W/MED SRVCS: CPT | Performed by: NURSE PRACTITIONER

## 2023-07-24 PROCEDURE — 6370000000 HC RX 637 (ALT 250 FOR IP): Performed by: NURSE PRACTITIONER

## 2023-07-24 RX ORDER — LORAZEPAM 2 MG/ML
2 INJECTION INTRAMUSCULAR EVERY 6 HOURS PRN
Status: DISCONTINUED | OUTPATIENT
Start: 2023-07-24 | End: 2023-07-28 | Stop reason: HOSPADM

## 2023-07-24 RX ORDER — HALOPERIDOL 5 MG/ML
5 INJECTION INTRAMUSCULAR EVERY 6 HOURS PRN
Status: DISCONTINUED | OUTPATIENT
Start: 2023-07-24 | End: 2023-07-28 | Stop reason: HOSPADM

## 2023-07-24 RX ORDER — OXCARBAZEPINE 300 MG/1
300 TABLET, FILM COATED ORAL 2 TIMES DAILY
Status: DISCONTINUED | OUTPATIENT
Start: 2023-07-24 | End: 2023-07-25

## 2023-07-24 RX ORDER — PALIPERIDONE 3 MG/1
3 TABLET, EXTENDED RELEASE ORAL 2 TIMES DAILY
Status: DISCONTINUED | OUTPATIENT
Start: 2023-07-24 | End: 2023-07-25

## 2023-07-24 RX ORDER — DIPHENHYDRAMINE HYDROCHLORIDE 50 MG/ML
50 INJECTION INTRAMUSCULAR; INTRAVENOUS EVERY 6 HOURS PRN
Status: DISCONTINUED | OUTPATIENT
Start: 2023-07-24 | End: 2023-07-28 | Stop reason: HOSPADM

## 2023-07-24 RX ADMIN — OXCARBAZEPINE 300 MG: 300 TABLET, FILM COATED ORAL at 11:19

## 2023-07-24 RX ADMIN — PALIPERIDONE 3 MG: 3 TABLET, EXTENDED RELEASE ORAL at 11:19

## 2023-07-24 RX ADMIN — PALIPERIDONE 3 MG: 3 TABLET, EXTENDED RELEASE ORAL at 20:56

## 2023-07-24 RX ADMIN — OXCARBAZEPINE 300 MG: 300 TABLET, FILM COATED ORAL at 20:56

## 2023-07-24 NOTE — PLAN OF CARE
Problem: Risk for Elopement  Goal: Patient will not exit the unit/facility without proper excort  Outcome: Progressing     Problem: Involuntary Admit  Goal: Will cooperate with staff recommendations and doctor's orders and will demonstrate appropriate behavior  Description: INTERVENTIONS:  1. Treat underlying conditions and offer medication as ordered  2. Educate regarding involuntary admission procedures and rules  3. Contain excessive/inappropriate behavior per unit and hospital policies  Outcome: Progressing     Problem: Self Harm/Suicidality  Goal: Will have no self-injury during hospital stay  Description: INTERVENTIONS:  1. Ensure constant observer at bedside with Q15M safety checks  2. Maintain a safe environment  3. Secure patient belongings  4. Ensure family/visitors adhere to safety recommendations  5. Ensure safety tray has been added to patient's diet order  6. Every shift and PRN: Re-assess suicidal risk via Frequent Screener    Outcome: Progressing     Problem: Depression  Goal: Will be euthymic at discharge  Description: INTERVENTIONS:  1. Administer medication as ordered  2. Provide emotional support via 1:1 interaction with staff  3. Encourage involvement in milieu/groups/activities  4. Monitor for social isolation  Outcome: Progressing     Problem: Jessica  Goal: Will exhibit normal sleep and speech and no impulsivity  Description: INTERVENTIONS:  1. Administer medication as ordered  2. Set limits on impulsive behavior  3. Make attempts to decrease external stimuli as possible  Outcome: Progressing     Problem: Sleep Disturbance  Goal: Will exhibit normal sleeping pattern  Description: INTERVENTIONS:  1. Administer medication as ordered  2. Decrease environmental stimuli, including noise, as appropriate  3.  Discourage social isolation and naps during the day  Outcome: Progressing     Problem: Self Care Deficit  Goal: Return ADL status to a safe level of function  Description:

## 2023-07-24 NOTE — PROGRESS NOTES
Patient was in and out of room,when out was hanging close to nurse station, requesting and asking multiple questions. Affect blunt,flat,suspicious,paranoid  Alert and oriented x 4. Verbalizes that she is here because she went to ER because I was bleeding from ear and want to get referral to eye,ear and throat doctor.'  Denies suicidal,homicidal or AV hallucinations. Denies anxiety or depression. Verbalizes that her missy father called when at ER ,don't know how because he is [facility]. He ask me if seeing that other person then then starts in telling me how much he loves me and misses me. I told him I had to go because my ear , \"he stated that he put a microchip in there. No one wants to check my ear out. Patient did attend evening group. C/O of left ear pain was medicated see MAR. Purposeful rounds continue.

## 2023-07-24 NOTE — PLAN OF CARE
Problem: Depression  Goal: Will be euthymic at discharge  Description: INTERVENTIONS:  1. Administer medication as ordered  2. Provide emotional support via 1:1 interaction with staff  3. Encourage involvement in milieu/groups/activities  4.  Monitor for social isolation  7/23/2023 2243 by Devyn Burch RN  Outcome: Not Progressing  7/23/2023 1421 by Chang Price RN  Outcome: Progressing  Patient denies depression, but observed flat, suspicious,preoccuppied

## 2023-07-24 NOTE — PROGRESS NOTES
Patient declined invitation to community meeting at this time. Patient will continue to be provided with opportunities to enhance leisure skills/interests and/or coping mechanisms.

## 2023-07-24 NOTE — H&P
Department of Psychiatry  History and Physical - Adult     CHIEF COMPLAINT:      Patient was seen after discussing with the treatment team and reviewing the chart    CIRCUMSTANCES OF ADMISSION:   Patient presented to the ED believing she had a chip implanted in her ear by children's father who is  in [facility]. HISTORY OF PRESENT ILLNESS:      The patient is a 34 y.o. female with significant past history of past in patient psychiatric hospitalization in Nov of 2022, with hx of TBI. Presented to the ER reporting a transmitter in her ear that she believes the father of her children implanted in her. Patient presented to the [place] ER with her children reporting that she has microchips implanted in her ear. She was pink slipped for psychotic behavior, her father was contacted to come  her children. She denies any psychiatric history despite having a psychiatric admission at [facility] in Nov of 2022. She does not believe anything is wrong with her psychiatrically. Though is reporting implanted transmitter in her ear, placed by the father of her children who is in [employer]. Physical exam in ER revealed no implants in her ear The left ear was irrigated as there was a small piece of hair present in the ear. Did perform CT scan that did not show any foreign body or acute abnormality\"  She did not understand why she was pink slipped , \"I am not crazy, y'all are keeping me here for no reason. My baby dad told me I have micro chips in my ear and I needed to get it checked out. Y'all are doing this for no reason it's crazy. \" Per ER staff the patient was observed to be hallucinating. Her affect was noted to be flat. Her insight, judgement and impulse control are poor. She has a concrete thought process and has delusional content. She has hx of TBI and no previous psychiatric history disclosed with respect to her admission in November.  Apparently,  while in the ER she was demonstrating very poor judgement and LABBENZ NEGATIVE 07/23/2023 03:25 AM    CANNAB POSITIVE 05/20/2013 11:45 PM    COCAINESCRN NOT DETECTED 05/20/2013 11:45 PM    LABMETH NEGATIVE 07/23/2023 03:25 AM    OPIATESCREENURINE NOT DETECTED 11/22/2022 08:13 PM    PHENCYCLIDINESCREENURINE NOT DETECTED 11/22/2022 08:13 PM    ETOH <10 11/22/2022 08:13 PM     Lab Results   Component Value Date/Time    TSH 1.030 02/17/2015 04:10 PM     No results found for: LITHIUM  No results found for: VALPROATE, CBMZ  No results found for: LITHIUM, VALPROATE      Radiology XR CHEST (2 VW)    Result Date: 7/16/2023  EXAMINATION: TWO XRAY VIEWS OF THE CHEST 7/16/2023 12:49 am COMPARISON: None. HISTORY: ORDERING SYSTEM PROVIDED HISTORY: dyspnea TECHNOLOGIST PROVIDED HISTORY: Reason for exam:->dyspnea FINDINGS: The lungs are without acute focal process. There is no effusion or pneumothorax. The cardiomediastinal silhouette is without acute process. The osseous structures are without acute process. No acute process. CT HEAD WO CONTRAST    Result Date: 7/23/2023  EXAMINATION: CT OF THE HEAD WITHOUT CONTRAST  7/23/2023 4:07 am TECHNIQUE: CT of the head was performed without the administration of intravenous contrast. Automated exposure control, iterative reconstruction, and/or weight based adjustment of the mA/kV was utilized to reduce the radiation dose to as low as reasonably achievable. COMPARISON: 11/23/2022 HISTORY: ORDERING SYSTEM PROVIDED HISTORY: thinks microchips are in ears/head TECHNOLOGIST PROVIDED HISTORY: Has a \"code stroke\" or \"stroke alert\" been called? ->No Reason for exam:->thinks microchips are in ears/head Decision Support Exception - unselect if not a suspected or confirmed emergency medical condition->Emergency Medical Condition (MA) FINDINGS: BRAIN/VENTRICLES: There is no acute intracranial hemorrhage, mass effect or midline shift. No abnormal extra-axial fluid collection.   The gray-white differentiation is maintained without evidence of an acute

## 2023-07-24 NOTE — GROUP NOTE
Group Therapy Note    Date: 7/24/2023  Start Time: 1110  End Time:  1150  Number of Participants: 12    Type of Group: Psychoeducation    Wellness Binder Information  Module Name:  7 Grace Rules of Life    Patient's Goal:  ID ways to improve positive outlook on life, increase socialization through discussion amongst peers    Notes:  Patient actively engaged in discussion of 7 grace rules of life, and able to ID ways to utilize information to improve their outlook. Status After Intervention:  Improved    Participation Level:  Active Listener and Interactive    Participation Quality: Appropriate, Attentive, and Sharing      Speech:  normal      Thought Process/Content: Logical      Affective Functioning: Congruent      Mood: euthymic      Level of consciousness:  Alert and Attentive      Response to Learning: Able to verbalize current knowledge/experience and Able to verbalize/acknowledge new learning      Endings: None Reported    Modes of Intervention: Education, Support, Socialization, and Exploration      Discipline Responsible: Psychoeducational Specialist      Signature:  ESTHER Watkins     Group Therapy Note    Attendees: 12

## 2023-07-24 NOTE — PROGRESS NOTES
Patient engaged in recreation activity- Movie. Patient calm and cooperative and exhibited an overall relaxed behavior. Patient was 1 of 8 in attendance.

## 2023-07-24 NOTE — PROGRESS NOTES
[facility]  Initial Interdisciplinary Treatment Plan NOTE    Review Date & Time: 07/24/2023 0900    Patient was in treatment team    Admission Type:        Reason for admission:         Estimated Length of Stay Update:  1-3  Estimated Discharge Date Update: 07/26/2023    EDUCATION:   Learner Progress Toward Treatment Goals: Reviewed results and recommendations of this team    Method: Small group    Outcome: Verbalized understanding    PATIENT GOALS: None at this time. PLAN/TREATMENT RECOMMENDATIONS UPDATE: Encourage patient to attend and participate in groups. Take medication as prescribed. GOALS UPDATE:   Time frame for Short-Term Goals: Prior to discharge.     Heath King RN

## 2023-07-24 NOTE — PROGRESS NOTES
Nurse to nurse report received from [facility], patient transferred to unit, awaiting clean bed, housekeeping completing at this time. Patient oriented to unit, introduced to staff, verbalized understanding. No acute distress. Will monitor Q 15 min for safety.

## 2023-07-24 NOTE — PROGRESS NOTES
Patient resting quietly with eyes closed. No S/S of distress noted or observed. PRN medication Tylenol given for pain. Will continue to monitor, provide needs and safe environment with continue rounding every 15 minutes.

## 2023-07-24 NOTE — GROUP NOTE
Group Therapy Note    Date: 7/24/2023    Group Start Time: 1405  Group End Time: 1440  Group Topic: Cognitive Skills    [facility] 7SE ACUTE BH 1    Elsi Vasques, LPC        Group Therapy Note    Attendees: 10     Patient's Goal:  To participate in coping skills for anxiety cognitive skills group    Notes:  Patient was an active participant in group    Status After Intervention:  Improved    Participation Level: Interactive    Participation Quality: Attentive and Sharing      Speech:  normal      Thought Process/Content: Logical      Affective Functioning: Congruent      Mood: euthymic      Level of consciousness:  Alert      Response to Learning: Able to verbalize current knowledge/experience      Endings: None Reported    Modes of Intervention: Education, Support, Socialization, Exploration, Clarifying, and Problem-solving      Discipline Responsible: /Counselor      Signature:  Deo Sen [facility]

## 2023-07-25 PROCEDURE — 6370000000 HC RX 637 (ALT 250 FOR IP): Performed by: NURSE PRACTITIONER

## 2023-07-25 PROCEDURE — 1240000000 HC EMOTIONAL WELLNESS R&B

## 2023-07-25 PROCEDURE — 99232 SBSQ HOSP IP/OBS MODERATE 35: CPT | Performed by: NURSE PRACTITIONER

## 2023-07-25 RX ORDER — VALPROIC ACID 250 MG/5ML
250 SOLUTION ORAL 2 TIMES DAILY
Status: DISCONTINUED | OUTPATIENT
Start: 2023-07-25 | End: 2023-07-28 | Stop reason: HOSPADM

## 2023-07-25 RX ORDER — RISPERIDONE 0.5 MG/1
1 TABLET, ORALLY DISINTEGRATING ORAL 2 TIMES DAILY
Status: DISCONTINUED | OUTPATIENT
Start: 2023-07-25 | End: 2023-07-26

## 2023-07-25 RX ADMIN — OXCARBAZEPINE 300 MG: 300 TABLET, FILM COATED ORAL at 09:37

## 2023-07-25 RX ADMIN — PALIPERIDONE 3 MG: 3 TABLET, EXTENDED RELEASE ORAL at 09:37

## 2023-07-25 RX ADMIN — VALPROIC ACID 250 MG: 250 SOLUTION ORAL at 20:42

## 2023-07-25 RX ADMIN — RISPERIDONE 1 MG: 0.5 TABLET, ORALLY DISINTEGRATING ORAL at 20:42

## 2023-07-25 ASSESSMENT — PAIN SCALES - GENERAL: PAINLEVEL_OUTOF10: 0

## 2023-07-25 NOTE — CARE COORDINATION
SW received a call from pt mom Ashley [id number] (JESUS signed). Ranjit Higgins reports pt went to the emergency room because her ear was hurting and she had a headache. Ranjit Higgins reports gets depressed and overwhelmed from time to time. She does not know if pt has been taking her medications as prescribed and could not say if she noticed any changes in pt recently. Ranjit Higgins reports pt recently started online classes. Pt can return to her home and she does not have access to any guns or weapons. Ranjit Higgins confirmed herself and pt dad are caring for pt children during this admission. Ranjit Higgins and pt dad live within half a mile of pt. Ranjit Higgins has no concerns for pt other than her getting back to school because she is falling behind.

## 2023-07-25 NOTE — PLAN OF CARE
Patient is alert and oriented x 4. Denies pain. No noted signs or symptoms of distress. Denies SI/HI, A/V/H, or thoughts of self harm when asked. Rates Anxiety at 0/10 and Depression at 0/10. Attended two on unit groups this shift. Patient is not medication compliant - cheeking precautions to continue. Pleasant, polite, and cooperative but can be argumentative, insists she is medication compliant and needs to be discharged because she has \"3 kids at home\" and \"is in school\". blunted Affect. Appropriate with peers but noted to be staff splitting. Patient approached numerous staff members saying/stating the same thing and insisting that she has taken her medications so should be discharged. Patient does not deny spitting her meds out only continues to state \"but I took them\", this nurse has emphasized that she needs to not only take them but also swallow them. Appears well groomed/neat, room Is clean. Goal for the day was - \"Go to some groups\". Up for all meals. Will continue to monitor for safety q 15 minute safety rounds and environmental assessments. Problem: Jessica  Goal: Will exhibit normal sleep and speech and no impulsivity  Description: INTERVENTIONS:  1. Administer medication as ordered  2. Set limits on impulsive behavior  3. Make attempts to decrease external stimuli as possible  Outcome: Not Progressing     Problem: Self Care Deficit  Goal: Return ADL status to a safe level of function  Description: INTERVENTIONS:  1. Administer medication as ordered  2. Assess ADL deficits and provide assistive devices as needed  3. Obtain PT/OT consults as needed  4.  Assist and instruct patient to increase activity and self care as tolerated  Outcome: Not Progressing

## 2023-07-25 NOTE — GROUP NOTE
Group Therapy Note    Date: 7/25/2023    Group Start Time: 1000  Group End Time: 1050  Group Topic: Psychoeducation    [facility] AC[facility]  [address], MSW, LSW        Group Therapy Note    Attendees: 16       Patient's Goal:  Pt will be able to identify the symptoms that they have from anxiety, causes of anxiety, and treatment that could help to reduce their anxiety. Notes:  pt participated in group and made connections. Status After Intervention:  Improved    Participation Level:  Active Listener and Interactive    Participation Quality: Appropriate, Attentive, Sharing, and Supportive      Speech:  normal      Thought Process/Content: Logical  Linear      Affective Functioning: Congruent      Mood: anxious      Level of consciousness:  Alert, Oriented x4, and Attentive      Response to Learning: Able to verbalize current knowledge/experience, Able to verbalize/acknowledge new learning, Able to retain information, and Capable of insight      Endings: None Reported    Modes of Intervention: Education, Support, Socialization, Exploration, Clarifying, and Problem-solving      Discipline Responsible: /Counselor      Signature:  PREM Ayoub, [facility]

## 2023-07-25 NOTE — BH NOTE
Patient spit out Risperdal dissolvable tablet into a cup in an attempt to divert the medication, also spit out the Dekakene syrup and this was seen by this nurse. When asked to give me the styrofoam cup that the meds were spit into, patient became defensive, got up and walked away, refusing to give me the cup, saying \"this is my cup of water. \" Medication marked as not given, see eMar.

## 2023-07-25 NOTE — PLAN OF CARE
Compliant w meds. Socializing w peers. Appeared to have healthy appetite at snack time. Denies a/vH, si and hi. Charged ph for pt and let her briefly retrieve and write down loved ones' ph numbers on paper. Came out of room and had excellent attn and particip during this shailesh's 1h group on astronomy and horse. Attentive to a/v materials. Scored 100 on quiz. Appeared to fully digest key learning points.

## 2023-07-25 NOTE — PROGRESS NOTES
Patient is isolative to her room except for meals. She responses were minimal. Denies concerns or complaints.

## 2023-07-25 NOTE — PROGRESS NOTES
Patient defensive when this nurse approached regarding her medications, she was made aware that \"cheeking precautions\" were established which included keeping a close eye on her after medication administration. Patient became insistent she needed to use the restroom, this nurse monitored patient 1:1. Patient was informed that if she continued to \"spit out\" her medications then she would likely not be discharged as quickly as she would like and that injections could be ordered. Patient continues to speak over this nurse insisting she took her medications. When shown her medication cup, it is noted to have pink liquid in it and additional sediment - likely her risperdal M-tab. Despite this, patient continues to state \"I took my medicine\". Will continue to monitor.

## 2023-07-25 NOTE — CARE COORDINATION
Pt requested to talk with SW. Pt had questions about her discharge, follow up, and medications. Pt has treated with [facility] in [place] in the past and would like to be referred there again at time of discharge. Pt spoke about needing to get back to her kids, school, and work. Pt reports school helps her feel better and she misses doing homework. Pt reports she came to the hospital because she had ear pain and thought she would be referred to an ENT. Pt does not feel she needs to be here. Pt stated multiple times she has been compliant with her treatment, she is not misbehaving, she is not interrupted staff, etc. Pt wanted to talk with SW about her medications despite SW telling her multiple times SW cannot discuss her medications. Pt requesting to talk with an RN about her medications. SW explained the discharge process and what the doctor looks for to be discharged multiple times. Pt discharge focused, tangential, circumstantial, interrupted SW when explanations were given, fair eye contact, low in volume speech, poor insight/judgement. SW advised RN that pt would like to talk with her about her medications.

## 2023-07-25 NOTE — PROGRESS NOTES
Pt did attend morning community meeting. Pt was updated on staffing and daily expectations. Pt identified goal for today as to go to some groups.

## 2023-07-25 NOTE — PROGRESS NOTES
Pt attended afternoon peer support group. Pt appeared to be an active listener. Pt is able to share appropriately when prompted and asked facilitator relevant questions. Pt was participant 1 of 12.

## 2023-07-26 LAB — 10OH-CARBAZEPINE SERPL-MCNC: <1 UG/ML (ref 3–35)

## 2023-07-26 PROCEDURE — 6370000000 HC RX 637 (ALT 250 FOR IP): Performed by: NURSE PRACTITIONER

## 2023-07-26 PROCEDURE — 1240000000 HC EMOTIONAL WELLNESS R&B

## 2023-07-26 PROCEDURE — 99232 SBSQ HOSP IP/OBS MODERATE 35: CPT | Performed by: NURSE PRACTITIONER

## 2023-07-26 RX ORDER — RISPERIDONE 0.5 MG/1
1 TABLET, ORALLY DISINTEGRATING ORAL DAILY
Status: DISCONTINUED | OUTPATIENT
Start: 2023-07-27 | End: 2023-07-28 | Stop reason: HOSPADM

## 2023-07-26 RX ORDER — RISPERIDONE 2 MG/1
2 TABLET, ORALLY DISINTEGRATING ORAL 2 TIMES DAILY
Status: DISCONTINUED | OUTPATIENT
Start: 2023-07-26 | End: 2023-07-26

## 2023-07-26 RX ORDER — RISPERIDONE 2 MG/1
2 TABLET, ORALLY DISINTEGRATING ORAL NIGHTLY
Status: DISCONTINUED | OUTPATIENT
Start: 2023-07-26 | End: 2023-07-28 | Stop reason: HOSPADM

## 2023-07-26 RX ADMIN — VALPROIC ACID 250 MG: 250 SOLUTION ORAL at 09:24

## 2023-07-26 RX ADMIN — RISPERIDONE 1 MG: 0.5 TABLET, ORALLY DISINTEGRATING ORAL at 09:24

## 2023-07-26 RX ADMIN — RISPERIDONE 2 MG: 2 TABLET, ORALLY DISINTEGRATING ORAL at 20:34

## 2023-07-26 RX ADMIN — VALPROIC ACID 250 MG: 250 SOLUTION ORAL at 20:34

## 2023-07-26 NOTE — PROGRESS NOTES
BEHAVIORAL HEALTH FOLLOW-UP NOTE     2023     Patient was seen and examined in person, Chart reviewed   Patient's case discussed with staff/team    Chief Complaint: \"I am doing okay\"    Interim History: I saw patient's morning in treatment team today she states that she is doing better she did start taking her medications. She does seem calmer she is less hyperverbal she is not demanding discharge she states she no longer make any statement about transmitters or microchips being her ears. She reports improved sleep last night.   She does seem to minimizing the circumstance of hospitalization need for treatment    Appetite: [x] Normal/Unchanged  [] Increased  [] Decreased      Sleep:       [x] Normal/Unchanged  [] Fair       [] Poor              Energy:    [x] Normal/Unchanged  [] Increased  [] Decreased        SI [] Present  [x] Absent    HI  []Present  [x] Absent     Aggression:  [] yes  [x] no    Patient is [x] able  [] unable to CONTRACT FOR SAFETY     PAST MEDICAL/PSYCHIATRIC HISTORY:   Past Medical History:   Diagnosis Date    Abnormal Pap smear of cervix     in  - precancerous cells    Allergic     seasonal    Bipolar 1 disorder ([facility]) 2022    Emotional disorder     Rh sensitized     Traumatic injury during pregnancy, third trimester        FAMILY/SOCIAL HISTORY:  Family History   Problem Relation Age of Onset    Hypertension Mother     Hypertension Father      Social History     Socioeconomic History    Marital status: Single     Spouse name: Not on file    Number of children: 3    Years of education: 12    Highest education level: Not on file   Occupational History    Not on file   Tobacco Use    Smoking status: Former     Types: Cigarettes     Quit date: 1/3/2015     Years since quittin.5    Smokeless tobacco: Never   Vaping Use    Vaping Use: Never used   Substance and Sexual Activity    Alcohol use: No     Comment: before pregnant    Drug use: No     Comment: last used 2 years ago

## 2023-07-26 NOTE — PROGRESS NOTES
Out with peers drawing pictures evasive with poor insight and judgement minimizes need to be here denies any SI HI or tay emotional support given

## 2023-07-26 NOTE — CARE COORDINATION
Pt was seen during treatment team. Pt states that she is doing good. She reports that she has taken her medications and they seem to be helping. She denied SI/HI/AVH. Pt states that she has been sleeping good. Pt reports that she has recently spoke with her mom and she is doing well. She states that she is excited to return to school and see her child once she is discharged. SW contacted [facility] [phone] to schedule appointment for pt. Pt has appointment 8/1 at 2pm in office for counseling. Pt is not active with medication management and will need to be referred by her counselor. They are aware that pt should be referred for medications during her counseling appointment. She states that they are currently scheduling medication appointments in august and they should be able to get pt scheduled in time.

## 2023-07-26 NOTE — PLAN OF CARE
Problem: Risk for Elopement  Goal: Patient will not exit the unit/facility without proper excort  Outcome: Progressing     Problem: Involuntary Admit  Goal: Will cooperate with staff recommendations and doctor's orders and will demonstrate appropriate behavior  Description: INTERVENTIONS:  1. Treat underlying conditions and offer medication as ordered  2. Educate regarding involuntary admission procedures and rules  3.  Contain excessive/inappropriate behavior per unit and hospital policies  Outcome: Progressing

## 2023-07-26 NOTE — GROUP NOTE
Group Therapy Note    Date: 7/26/2023    Group Start Time: 1110  Group End Time: 4177  Group Topic: Psychotherapy    [facility] ACUTE  [address] PRE[address], \[facility]\""        Group Therapy Note    Attendees: 5       Patient's Goal:  To increase socialization and improve interpersonal relationships. Notes:  Pt was an active participant in group discussion. Status After Intervention:  Improved    Participation Level: Active Listener, Interactive, and Minimal    Participation Quality: Appropriate, Attentive, Sharing, and Supportive      Speech:  normal      Thought Process/Content: Linear      Affective Functioning: Flat      Mood: anxious      Level of consciousness:  Alert, Oriented x4, and Attentive      Response to Learning: Able to verbalize current knowledge/experience, Able to verbalize/acknowledge new learning, Able to retain information, Capable of insight, and Progressing to goal      Endings: None Reported    Modes of Intervention: Support, Socialization, and Exploration      Discipline Responsible: /Counselor      Signature:   PREM Navarro, [place]

## 2023-07-26 NOTE — PROGRESS NOTES
Patient denies SI,HI, or any Hallucinations. She is out on the unit to self but friendly. She doesn't engage in conversation but on assessment answers all questions needed, with brief answers. She appears guarded and has poor judgement and  poor insight. She states the meds are helping her. States she feels less depressed, less anxious, and sleeps better. States she sleeps and eats good. Will continue to monitor and encourage her to attend groups.

## 2023-07-26 NOTE — PROGRESS NOTES
[facility]  Initial Interdisciplinary Treatment Plan NOTE    Review Date & Time: 7/26/23 1000    Patient was in treatment team    Admission Type:        Reason for admission:         Estimated Length of Stay Update:  3-5 days  Estimated Discharge Date Update: 3-5 days    EDUCATION:   Learner Progress Toward Treatment Goals: Reviewed results and recommendations of this team    Method: Small group    Outcome: Verbalized understanding    PATIENT GOALS: Stay positive and socialize more. PLAN/TREATMENT RECOMMENDATIONS UPDATE: Continue current treatment plan and monitor.      GOALS UPDATE:   Time frame for Short-Term Goals: 3-5 days    Paulette Odom RN

## 2023-07-26 NOTE — PROGRESS NOTES
Patient attended community meeting   Was updated on expectations of the unit, staffing, and programming  Patient shared goal for today as \"stay positive and socialize more\"

## 2023-07-26 NOTE — GROUP NOTE
Group Therapy Note    Date: 7/26/2023    Group Start Time: 3215  Group End Time: 9039  Group Topic: Psychoeducation    [facility] AC[facility] BH 2    Kristan Johnston                                                                        Group Therapy Note    Date: 7/26/2023  Start Time: 5480  End Time:  1050  Number of Participants: 16    Type of Group: Psychoeducation    Wellness Binder Information  Module Name:  Grounding and 72805 Method    Patient's Goal:  Increase awareness of different types of grounding techniques. Demonstrate knowledge of 36669 method. Notes:  CTRS discussed different grounding techniques and demonstrated the 39610 method. Patient engaged in discussion and was receptive of handout. Status After Intervention:  Improved    Participation Level:  Active Listener and Interactive    Participation Quality: Appropriate, Attentive, and Sharing      Speech:  normal      Thought Process/Content: Logical      Affective Functioning: Congruent      Mood: euthymic      Level of consciousness:  Alert and Attentive      Response to Learning: Able to verbalize current knowledge/experience and Able to verbalize/acknowledge new learning      Endings: None Reported    Modes of Intervention: Education, Exploration, and Clarifying      Discipline Responsible: Psychoeducational Specialist      Signature:  Kristan Johnston

## 2023-07-27 PROCEDURE — 1240000000 HC EMOTIONAL WELLNESS R&B

## 2023-07-27 PROCEDURE — 6370000000 HC RX 637 (ALT 250 FOR IP): Performed by: PSYCHIATRY & NEUROLOGY

## 2023-07-27 PROCEDURE — 99232 SBSQ HOSP IP/OBS MODERATE 35: CPT | Performed by: NURSE PRACTITIONER

## 2023-07-27 PROCEDURE — 6370000000 HC RX 637 (ALT 250 FOR IP): Performed by: NURSE PRACTITIONER

## 2023-07-27 RX ADMIN — VALPROIC ACID 250 MG: 250 SOLUTION ORAL at 09:52

## 2023-07-27 RX ADMIN — ACETAMINOPHEN 650 MG: 325 TABLET ORAL at 23:55

## 2023-07-27 RX ADMIN — VALPROIC ACID 250 MG: 250 SOLUTION ORAL at 21:13

## 2023-07-27 RX ADMIN — RISPERIDONE 2 MG: 2 TABLET, ORALLY DISINTEGRATING ORAL at 21:13

## 2023-07-27 RX ADMIN — RISPERIDONE 1 MG: 0.5 TABLET, ORALLY DISINTEGRATING ORAL at 09:52

## 2023-07-27 ASSESSMENT — PAIN SCALES - GENERAL
PAINLEVEL_OUTOF10: 0
PAINLEVEL_OUTOF10: 0
PAINLEVEL_OUTOF10: 3

## 2023-07-27 NOTE — PROGRESS NOTES
Patient attended community meeting   Was updated on expectations of the unit, staffing, and programming  Patient shared goal for today as \"socialize and go to groups\"

## 2023-07-27 NOTE — CARE COORDINATION
LIONEL received voicemail from pt parents asking for update on pt status. LIONEL contacted pt parents Giovanna Ernst  (JESUS signed). No answer, LIONEL left voicemail.

## 2023-07-27 NOTE — PLAN OF CARE
Problem: Involuntary Admit  Goal: Will cooperate with staff recommendations and doctor's orders and will demonstrate appropriate behavior  Description: INTERVENTIONS:  1. Treat underlying conditions and offer medication as ordered  2. Educate regarding involuntary admission procedures and rules  3. Contain excessive/inappropriate behavior per unit and hospital policies  2/21/9399 1120 by Adam Oliva RN  Outcome: Progressing  7/26/2023 1154 by Philip Head RN  Outcome: Progressing     Problem: Self Harm/Suicidality  Goal: Will have no self-injury during hospital stay  Description: INTERVENTIONS:  1. Ensure constant observer at bedside with Q15M safety checks  2. Maintain a safe environment  3. Secure patient belongings  4. Ensure family/visitors adhere to safety recommendations  5. Ensure safety tray has been added to patient's diet order  6. Every shift and PRN: Re-assess suicidal risk via Frequent Screener    Outcome: Progressing       Pt denies SI, HI and AVH. Pt is out on the unit. Guarded. Calm and cooperative. Brightened. Appropriate. Medication compliant. Attends group. Will continue to monitor.

## 2023-07-27 NOTE — BH NOTE
Took meds infront of this nurse and two RN staff, checked for cheeking, patient did take medications. Polite and kind. Sitting in dining room after taking meds.

## 2023-07-27 NOTE — GROUP NOTE
Group Therapy Note    Date: 7/27/2023    Group Start Time: 3044  Group End Time: 7580  Group Topic: Psychoeducation    [facility] BH 2    Kristan Johnston                                                                        Group Therapy Note    Date: 7/27/2023  Start Time: 1015  End Time:  1055  Number of Participants: 7    Type of Group: Psychoeducation    Wellness Binder Information  Module Name:  Stress ID and thumb ball  Patient's Goal:  Id difference between daily stressors and life events. ID one or two ways to improve coping with stressors. Patient engaged in thumb ball as a relaxation activity after discussion. Notes:  Patient actively engaged in discussion of daily and life event stressors. Patient receptive to information and handout. Patient active participant in thumb ball, and overall exhibited good participation. Status After Intervention:  Improved    Participation Level:  Active Listener and Interactive    Participation Quality: Appropriate, Attentive, and Sharing      Speech:  normal      Thought Process/Content: Logical      Affective Functioning: Congruent      Mood: euthymic      Level of consciousness:  Alert and Attentive      Response to Learning: Able to verbalize current knowledge/experience and Able to verbalize/acknowledge new learning      Endings: None Reported    Modes of Intervention: Education, Activity, and Movement      Discipline Responsible: Psychoeducational Specialist      Signature:  Kristan Johnston     Group Therapy Note    Attendees: 7

## 2023-07-27 NOTE — CARE COORDINATION
SW received call from pt mother Keiko  (JESUS signed). She states that pt is doing much better and has no concerns if pt were to discharge tomorrow. She is able to transport at discharge.

## 2023-07-27 NOTE — BH NOTE
Mother Alejandrina Roach here visiting. She reports that Edmundo Saldaña is herself and she has no safety concerns. There is no weapons in the home. Mother will be able to pick patient up when discharged.

## 2023-07-27 NOTE — GROUP NOTE
Group Therapy Note    Date: 7/27/2023    Group Start Time: 1100  Group End Time: 1140  Group Topic: Cognitive Skills    [facility] AC[facility] BH 1    PREM Joseph, DURANW        Group Therapy Note    Attendees: 9       Patient's Goal: to participate in group discussion on self-care. Notes: pt was an active participant in group discussion. Status After Intervention:  Unchanged    Participation Level:  Active Listener and Interactive    Participation Quality: Appropriate and Attentive      Speech:  normal      Thought Process/Content: Logical      Affective Functioning: Congruent      Mood: anxious      Level of consciousness:  Alert and Oriented x4      Response to Learning: Able to verbalize current knowledge/experience      Endings: None Reported    Modes of Intervention: Education, Support, Socialization, Exploration, Clarifying, and Problem-solving      Discipline Responsible: /Counselor      Signature:  PREM [place], [facility]

## 2023-07-27 NOTE — PLAN OF CARE
Problem: Self Harm/Suicidality  Goal: Will have no self-injury during hospital stay  Description: INTERVENTIONS:  1. Ensure constant observer at bedside with Q15M safety checks  2. Maintain a safe environment  3. Secure patient belongings  4. Ensure family/visitors adhere to safety recommendations  5. Ensure safety tray has been added to patient's diet order  6. Every shift and PRN: Re-assess suicidal risk via Frequent Screener    7/27/2023 1037 by Natalie Montoya RN  Outcome: Progressing  7/27/2023 0502 by Jaylyn Fuller RN  Outcome: Progressing  7/27/2023 0033 by Eagle Cano RN  Outcome: Progressing     Problem: Depression  Goal: Will be euthymic at discharge  Description: INTERVENTIONS:  1. Administer medication as ordered  2. Provide emotional support via 1:1 interaction with staff  3. Encourage involvement in milieu/groups/activities  4. Monitor for social isolation  7/27/2023 1037 by Natalie Montoya RN  Outcome: Progressing  7/27/2023 0502 by Jaylyn Fuller RN  Outcome: Progressing           Patient flat and depressed. Withdrawn to self and room. Encouraged to attend group. No delusions voiced. Denies suicidal and homicidal thoughts. Denies hallucinations.

## 2023-07-27 NOTE — PROGRESS NOTES
BEHAVIORAL HEALTH FOLLOW-UP NOTE     7/27/2023     Patient was seen and examined in person, Chart reviewed   Patient's case discussed with staff/team    Chief Complaint: \"I am doing better\"    Interim History:  Patient in her room this afternoon she is resting. She does wake when I call her name. She states that she is feeling a lot better. She makes good eye contact she has no underlying irritability she is open engaged in conversation. She states that she is looking forward to when she can be discharged because she misses her children and that her children are currently with her parents. She denies any auditory or visual hallucinations. She is not making any delusional statements she is not referencing any microchips in her ears. Her statements are linear future focused and goal-directed. She does not appear to be overtly or covertly psychotic or paranoid. And adamantly denies any suicidal or homicidal ideation intent or plan. Patient has been counseled regarding the importance of using birth control while on psychotropic medications specifically depakote and was able to verbalize understanding of education provided.     Appetite: [x] Normal/Unchanged  [] Increased  [] Decreased      Sleep:       [x] Normal/Unchanged  [] Fair       [] Poor              Energy:    [x] Normal/Unchanged  [] Increased  [] Decreased        SI [] Present  [x] Absent    HI  []Present  [x] Absent     Aggression:  [] yes  [x] no    Patient is [x] able  [] unable to CONTRACT FOR SAFETY     PAST MEDICAL/PSYCHIATRIC HISTORY:   Past Medical History:   Diagnosis Date    Abnormal Pap smear of cervix     in 2021 - precancerous cells    Allergic     seasonal    Bipolar 1 disorder ([facility]) 11/23/2022    Emotional disorder     Rh sensitized     Traumatic injury during pregnancy, third trimester        FAMILY/SOCIAL HISTORY:  Family History   Problem Relation Age of Onset    Hypertension Mother     Hypertension Father      Social History

## 2023-07-28 VITALS
HEART RATE: 95 BPM | OXYGEN SATURATION: 99 % | WEIGHT: 154 LBS | HEIGHT: 63 IN | TEMPERATURE: 98.2 F | DIASTOLIC BLOOD PRESSURE: 71 MMHG | RESPIRATION RATE: 16 BRPM | SYSTOLIC BLOOD PRESSURE: 115 MMHG | BODY MASS INDEX: 27.29 KG/M2

## 2023-07-28 PROCEDURE — 6370000000 HC RX 637 (ALT 250 FOR IP): Performed by: PSYCHIATRY & NEUROLOGY

## 2023-07-28 PROCEDURE — 99239 HOSP IP/OBS DSCHRG MGMT >30: CPT | Performed by: NURSE PRACTITIONER

## 2023-07-28 PROCEDURE — 6370000000 HC RX 637 (ALT 250 FOR IP): Performed by: NURSE PRACTITIONER

## 2023-07-28 RX ORDER — LANOLIN ALCOHOL/MO/W.PET/CERES
3 CREAM (GRAM) TOPICAL NIGHTLY PRN
Status: DISCONTINUED | OUTPATIENT
Start: 2023-07-28 | End: 2023-07-28 | Stop reason: HOSPADM

## 2023-07-28 RX ORDER — VALPROIC ACID 250 MG/5ML
250 SOLUTION ORAL 2 TIMES DAILY
Qty: 300 ML | Refills: 0 | Status: SHIPPED | OUTPATIENT
Start: 2023-07-28 | End: 2023-08-27

## 2023-07-28 RX ORDER — HYDROXYZINE PAMOATE 50 MG/1
50 CAPSULE ORAL 3 TIMES DAILY PRN
Status: DISCONTINUED | OUTPATIENT
Start: 2023-07-28 | End: 2023-07-28 | Stop reason: HOSPADM

## 2023-07-28 RX ORDER — RISPERIDONE 1 MG/1
1 TABLET, ORALLY DISINTEGRATING ORAL DAILY
Qty: 30 TABLET | Refills: 0 | Status: SHIPPED | OUTPATIENT
Start: 2023-07-29 | End: 2023-08-28

## 2023-07-28 RX ORDER — RISPERIDONE 2 MG/1
2 TABLET, ORALLY DISINTEGRATING ORAL NIGHTLY
Qty: 30 TABLET | Refills: 0 | Status: SHIPPED | OUTPATIENT
Start: 2023-07-28 | End: 2023-08-27

## 2023-07-28 RX ADMIN — RISPERIDONE 1 MG: 0.5 TABLET, ORALLY DISINTEGRATING ORAL at 09:09

## 2023-07-28 RX ADMIN — VALPROIC ACID 250 MG: 250 SOLUTION ORAL at 09:09

## 2023-07-28 RX ADMIN — HYDROXYZINE PAMOATE 50 MG: 50 CAPSULE ORAL at 00:40

## 2023-07-28 RX ADMIN — MELATONIN 3 MG ORAL TABLET 3 MG: 3 TABLET ORAL at 00:45

## 2023-07-28 ASSESSMENT — PAIN SCALES - GENERAL: PAINLEVEL_OUTOF10: 0

## 2023-07-28 NOTE — CARE COORDINATION
SW met with pt to discuss follow up appointments. SW informed pt that [facility] states that they will need to refer her for medication management at her counseling appointment and they should be able to get her in before she runs out of medications, but are unable to guarantee this. Pt is agreeable to bridge appointment with [facility] to ensure her medications are filled in time. Pt states that she is doing good, but is tired. Pt denied SI/HI/AVH. SW contacted [facility] [place] [phone] and pt has bridge appointment 8/16 at [facility] for medication management.

## 2023-07-28 NOTE — BH NOTE
Pt came to the nurses station stating that she was cold and shivering and wanted to know if she could have the temperature increased. Pt states, \"I have on 4 blankets, but I'm still cold. \" Pt room temperature modified to be warmer and Pt was given a warm cup of tea per her request. Pt temp 97.2F. Pt then states that her ears hurt and have been ringing. Pt states, \"I woke up on Saturday and my daughter had these beads around her and I think she put them in my ear. \" Pt admitted for this reason. Pt denies feeling anxiety and is upset that \"no one wants to help me. \" Pt then states that she needs \"a breathing mask, I can't breathe. \" Pt not in distress, SpO2 100% on RA. Pt states that she wants her Mom called so that her Mom can call 911 for her to get her out of here and to a different hospital.  notified and N.O. Vistaril 50mg po Q6hrs and Melatonin 3mg po QHS. Pt took them both and then went to rest in her room.

## 2023-07-28 NOTE — CARE COORDINATION
Pt was seen during treatment team. Pt states that she is feeling better. She reports that she was feeling cold last night. Pt denied SI/HI/AVH. Pt denied any paranoia. PT reports that she does not think anything is in her ear, knows that her ear was checked and nothing was found. Pt reports that she feels ready to discharge and misses her children. She states that she plans to follow up with her outpatient providers. Pt states that her mother will transport today. LIONEL provided hospital excuse for pt. LIONEL contacted pt parents Martir Willis  (JESUS signed) to discuss pt discharge. LIONEL spoke with Kamila who states that she has no concerns for pt discharge today. She states that she feels pt is ready to return home and sounds better to her. She will transport when pt is ready to discharge.       In order to ensure appropriate transition and discharge planning is in place, the following documents have been transmitted to Pertino, as the new outpatient provider:    The d/c diagnosis was transmitted to the next care provider  The reason for hospitalization was transmitted to the next care provider  The d/c medications (dosage and indication) were transmitted to the next care provider   The continuing care plan was transmitted to the next care provider

## 2023-07-28 NOTE — PROGRESS NOTES
[facility]  Discharge Note    Pt discharged with followings belongings:   Dental Appliances: None  Vision - Corrective Lenses: None  Hearing Aid: None  Jewelry: None  Body Piercings Removed: No  Clothing: Footwear, Pants  Other Valuables: Purse, Lighter/Matches, Keys   Valuables sent home with patient, along with filled prescriptions, safety plan, home medications, newly filled prescriptions and copy of AVS. All personal belongings from locker were returned to patient. Patient educated on aftercare instructions:  completed, reviewed and signed. Copy given to pt. .  Information faxed to  outpatient provider by  . Patient verbalize understanding of AVS:   yes with stated potential to comply to same. Status EXAM upon discharge:  Mental Status and Behavioral Exam    Level of Assistance: Independent/Self  Facial Expression: Elevated  Affect: Congruent  Level of Consciousness: Alert  Frequency of Checks: 4 times per hour, close  Mood: pleasant  Motor Activity:Normal: Yes  Motor Activity: Other (comment) (WNL)  Eye Contact: Good  Observed Behavior: Cooperative, Friendly  Sexual Misconduct History: Current - no  Preception: Holiday to person, Holiday to time, Holiday to place, Holiday to situation  Attention: improved  Thought Processes: more organized  Thought Content:Normal: yes  Thought Content: appropriate to conversation  Depression Symptoms: Impaired concentration  Anxiety Symptoms: minimal  Jessica Symptoms: none   Hallucinations: None  Delusions: No  Delusions:  Other (comment) (DENIES, NONE VOICED)  Memory:Normal: improved, intact  Insight and Judgment: improved      Tobacco Screening:  Practical Counseling, on admission, bhumika X, if applicable and completed (first 3 are required if patient doesn't refuse):            ( ) Recognizing danger situations (included triggers and roadblocks)                    ( ) Coping skills (new ways to manage stress,relaxation techniques, changing routine,

## 2023-07-28 NOTE — PROGRESS NOTES
CLINICAL PHARMACY NOTE: MEDS TO BEDS    Total # of Prescriptions Filled: 3   The following medications were delivered to the patient:  Risperidone 2  Risperidone 1  Valproic acid 250mg/5ml     Additional Documentation:

## 2023-07-28 NOTE — PLAN OF CARE
Pt out in common area, socializing with peers. Pt denies SI, Hi and AVH. Pt states that she believes that the meds are helping. Pt cooperative and friendly. Problem: Involuntary Admit  Goal: Will cooperate with staff recommendations and doctor's orders and will demonstrate appropriate behavior  Description: INTERVENTIONS:  1. Treat underlying conditions and offer medication as ordered  2. Educate regarding involuntary admission procedures and rules  3. Contain excessive/inappropriate behavior per unit and hospital policies  Outcome: Progressing     Problem: Self Harm/Suicidality  Goal: Will have no self-injury during hospital stay  Description: INTERVENTIONS:  1. Ensure constant observer at bedside with Q15M safety checks  2. Maintain a safe environment  3. Secure patient belongings  4. Ensure family/visitors adhere to safety recommendations  5. Ensure safety tray has been added to patient's diet order  6.   Every shift and PRN: Re-assess suicidal risk via Frequent Screener    7/27/2023 2058 by Suad Galarza RN  Outcome: Progressing

## 2023-07-28 NOTE — BH NOTE
Pt continues to not be able to rest. Pt states, \"I don't think my roommate likes that I put the light on in the restroom, but I couldn't help it. \" Pt offered to rest in the seclusion room and accepted. Pt continues to ask about reasons for her restlessness and inability to sleep. Pt thinks that it is her Ardean Zohaib" and that they need to be checked out. Pt educated on going to a PCP in the community that could assist her further with physiological issues, and that her anxiety concerning her health is what we can help with in the here and now. Pt denies having anxiety and believes that all problems stem from the \"nerves\" as well as the \"ringing in the ears. \" Pt educated on the symptoms of anxiety and coping with anxiety. Pt reluctantly accepted the information, but wtill wants to be assessed by a Dr to make sure. Pt is now resting in room with eyes closed.

## 2023-07-28 NOTE — PLAN OF CARE
Problem: Risk for Elopement  Goal: Patient will not exit the unit/facility without proper excort  Outcome: Progressing     Problem: Involuntary Admit  Goal: Will cooperate with staff recommendations and doctor's orders and will demonstrate appropriate behavior  Description: INTERVENTIONS:  1. Treat underlying conditions and offer medication as ordered  2. Educate regarding involuntary admission procedures and rules  3. Contain excessive/inappropriate behavior per unit and hospital policies  Outcome: Progressing     Problem: Self Harm/Suicidality  Goal: Will have no self-injury during hospital stay  Description: INTERVENTIONS:  1. Ensure constant observer at bedside with Q15M safety checks  2. Maintain a safe environment  3. Secure patient belongings  4. Ensure family/visitors adhere to safety recommendations  5. Ensure safety tray has been added to patient's diet order  6. Every shift and PRN: Re-assess suicidal risk via Frequent Screener    Outcome: Progressing     Problem: Depression  Goal: Will be euthymic at discharge  Description: INTERVENTIONS:  1. Administer medication as ordered  2. Provide emotional support via 1:1 interaction with staff  3. Encourage involvement in milieu/groups/activities  4. Monitor for social isolation  Outcome: Progressing     Problem: Jessica  Goal: Will exhibit normal sleep and speech and no impulsivity  Description: INTERVENTIONS:  1. Administer medication as ordered  2. Set limits on impulsive behavior  3. Make attempts to decrease external stimuli as possible  Outcome: Progressing     Problem: Sleep Disturbance  Goal: Will exhibit normal sleeping pattern  Description: INTERVENTIONS:  1. Administer medication as ordered  2. Decrease environmental stimuli, including noise, as appropriate  3.  Discourage social isolation and naps during the day  Outcome: Progressing     Problem: Self Care Deficit  Goal: Return ADL status to a safe level of function  Description: INTERVENTIONS:  1. Administer medication as ordered  2. Assess ADL deficits and provide assistive devices as needed  3. Obtain PT/OT consults as needed  4. Assist and instruct patient to increase activity and self care as tolerated  Outcome: Progressing     Problem: Spiritual Care  Goal: Pt/Family able to move forward in process of forgiving self, others, and/or higher power  Description: INTERVENTIONS:  1. Assist patient/family to overcome blocks to healing by use of spiritual practices (prayer, meditation, guided imagery, reiki, breath work, etc). 2. De-myth guilt and help patient/family identify possible irrational spiritual/cultural beliefs and values. 3. Explore possibilities of making amends & reconciliation with self, others, and/or a greater power. 4. Guide patient/family in identifying painful feelings of guilt. 5. Help patient/famiy explore and identify spiritual beliefs, cultural understandings or values that may help or hinder letting go of issue. 6. Help patient/family explore feelings of anger, bitterness, resentment. 7. Help patient/family identify and examine the situation in which these feelings are experienced. 8. Help patient/family identify destructive displacement of feelings onto other individuals. 9. Invite use of sacraments/rituals/ceremonies as appropriate (e.g. - confession, anointing, smudging). 10. Refer patient/family to formal counseling and/or to amanda community for further support work. Outcome: Progressing  PT. DENIED SUICIDAL IDEATION, HOMICIDAL IDEATION AND HALLUCINATIONS. PT. VOICED NO DELUSIONS DURING MORNING ASSESSMENT. INSIGHT IMPROVED. PT. HAS BEEN MEDICATION COMPLAINT, GROUPS ENCOURAGED. PT. IS BRIGHT AND PLEASANT AND SOCIAL WITH STAFF. NO UNIT PROBLEMS.        [facility]  WEEKLY  Interdisciplinary Treatment Plan NOTE    Review Date & Time:  7/28/23 1000    Patient was in treatment team    Estimated Length of Stay Update:    5 DAYS  Estimated

## 2023-07-28 NOTE — DISCHARGE INSTRUCTIONS
Follow up for Tobacco Cessation at:    [facility] Tobacco Treatment                                 Date:  Friday 8/4 at [facility].  [address]   ([address] [place] [address] elevators to 7th floor)   Phone: [phone]   Fax: [phone]

## 2023-07-30 NOTE — DISCHARGE SUMMARY
DISCHARGE SUMMARY      Patient ID:  Hilda Vizcarra  [id number]  2 y.o.  1995    Admit date: 2023    Discharge date and time: 2023    Admitting Physician: Yany Aguilera MD     Discharge Physician: Dr Lizeth Doherty MD    Discharge Diagnoses:   Patient Active Problem List   Diagnosis    Tumors of body of uterus, antepartum condition or complication    Encounter for fetal anatomic survey    29 weeks gestation of pregnancy    Normal pregnancy, unspecified trimester    Psychotic disorder [facility])       Admission Condition: poor    Discharged Condition: stable    Admission Circumstance: Patient presented to the ED believing she had a chip implanted in her ear by children's father who is  in [facility].        PAST MEDICAL/PSYCHIATRIC HISTORY:   Past Medical History:   Diagnosis Date    Abnormal Pap smear of cervix     in  - precancerous cells    Allergic     seasonal    Bipolar 1 disorder ([address]) 2022    Emotional disorder     Rh sensitized     Traumatic injury during pregnancy, third trimester        FAMILY/SOCIAL HISTORY:  Family History   Problem Relation Age of Onset    Hypertension Mother     Hypertension Father      Social History     Socioeconomic History    Marital status: Single     Spouse name: Not on file    Number of children: 3    Years of education: 12    Highest education level: Not on file   Occupational History    Not on file   Tobacco Use    Smoking status: Former     Types: Cigarettes     Quit date: 1/3/2015     Years since quittin.5    Smokeless tobacco: Never   Vaping Use    Vaping Use: Never used   Substance and Sexual Activity    Alcohol use: No     Comment: before pregnant    Drug use: No     Comment: last used 2 years ago    Sexual activity: Yes     Partners: Male   Other Topics Concern    Not on file   Social History Narrative    Not on file     Social Determinants of Health     Financial Resource Strain: Not on file   Food Insecurity: Not on file   Transportation Needs: Not on file FOLLOW UP CARE     Signed:  BARBY Willett - CNP  9/84/5946  11:46 AM

## 2024-01-30 NOTE — TELEPHONE ENCOUNTER
Patient is asking if she can reschedule her establishing appointment with Dr Zohreh Oscar. She had two no shows- and showed 25 minutes late for one appointment.  Please contact patient to reschedule if she is approved to do so, Hypertension

## 2024-03-01 NOTE — DISCHARGE SUMMARY
Obstetrical Discharge Form    Primary OB Clinician: SARTHAK Ann,  FACOG  Postpartum 2 Day #    Gestational Age at time of delivery: 27 weeks and 6 days    Date of Delivery: 2/10/2020; Time of Delivery: 0300    Delivery Type: spontaneous vaginal delivery    Baby: Liveborn female,     Intrapartum complications: None    Laceration: none    Episiotomy: none,    Feeding method: both breast and bottle - Similac with iron    Rh Immune globulin given: no    Rubella vaccine given: no    Discharge Date: 2/12/2020; Discharge Time: 1354    Early Discharge:  NO, condition at time of discharge is good as well as disposition    Plan:     Follow-up appointment with Dr. Orlando Jauregui in 6 weeks. Is This A New Presentation, Or A Follow-Up?: Skin Lesions

## 2024-04-23 NOTE — PROGRESS NOTES
structural  anomalies are noted. Only genetic amniocentesis can rule out fetal chromosome anomalies. Normal ultrasound does not. 4. The previously described umbilical vein varix resolved. It was not seen today. 5. Fetal well-being was confirmed today. The amount of fluid around baby is normal.  The Biophysical profile score of 8/8 is reassuring, and the umbilical artery Doppler studies are normal.  6. She should monitor fetal well-being at home by counting movements after dinner. Her baby should  move 10 times in 2 hours; otherwise, she should call your office immediately. She is also to call, if she develops any headaches, blurred vision, abdominal pain, bleeding, or spotting, which are signs of preeclampsia. 7. She is to continue to follow with you in your office for ongoing obstetric care. 8. If there is a need for further ultrasound evaluations in our office please do not hesitate to call our office and schedule an appointment. Thank you again, , for allowing us to be of service to your patient. If I can be of further assistance, please do not hesitate to call. Sincerely,         Duke Roberts M.D., Jean-Pierre Albarran     Current encounter billing:  [facility] OFFICE OUTPATIENT VISIT 15 MINUTES [18876]  US OB Follow Up Transabdominal Approach [CQP982 Custom]  US Fetal Biophysical Profile WO Non Stress Testing [37590 Custom]     **This report has been created using voice recognition software.  It may contain minor errors     which are inherent in voice recognition technology**
3 (mild pain)

## 2024-10-28 PROBLEM — Z34.90 NORMAL PREGNANCY, UNSPECIFIED TRIMESTER: Status: RESOLVED | Noted: 2022-06-05 | Resolved: 2024-10-28

## 2024-10-28 PROBLEM — Z3A.29 29 WEEKS GESTATION OF PREGNANCY: Status: RESOLVED | Noted: 2022-04-08 | Resolved: 2024-10-28

## 2024-11-08 ENCOUNTER — INITIAL PRENATAL (OUTPATIENT)
Dept: OBGYN CLINIC | Age: 29
End: 2024-11-08
Payer: COMMERCIAL

## 2024-11-08 ENCOUNTER — ANCILLARY PROCEDURE (OUTPATIENT)
Dept: OBGYN CLINIC | Age: 29
End: 2024-11-08
Payer: COMMERCIAL

## 2024-11-08 VITALS
SYSTOLIC BLOOD PRESSURE: 109 MMHG | DIASTOLIC BLOOD PRESSURE: 69 MMHG | WEIGHT: 179.3 LBS | BODY MASS INDEX: 31.76 KG/M2 | HEART RATE: 101 BPM

## 2024-11-08 DIAGNOSIS — O09.892 HX OF PRETERM DELIVERY, CURRENTLY PREGNANT, SECOND TRIMESTER: ICD-10-CM

## 2024-11-08 DIAGNOSIS — O34.10 TUMORS OF BODY OF UTERUS, ANTEPARTUM CONDITION OR COMPLICATION: ICD-10-CM

## 2024-11-08 DIAGNOSIS — Z36.89 ENCOUNTER FOR FETAL ANATOMIC SURVEY: Primary | ICD-10-CM

## 2024-11-08 DIAGNOSIS — Z3A.21 21 WEEKS GESTATION OF PREGNANCY: ICD-10-CM

## 2024-11-08 DIAGNOSIS — Z34.90 FIFTH PREGNANCY: ICD-10-CM

## 2024-11-08 DIAGNOSIS — F29 PSYCHOSIS, UNSPECIFIED PSYCHOSIS TYPE (HCC): ICD-10-CM

## 2024-11-08 LAB
GLUCOSE URINE, POC: NEGATIVE MG/DL
PROTEIN UA: NEGATIVE

## 2024-11-08 PROCEDURE — 81002 URINALYSIS NONAUTO W/O SCOPE: CPT | Performed by: OBSTETRICS & GYNECOLOGY

## 2024-11-08 PROCEDURE — 99203 OFFICE O/P NEW LOW 30 MIN: CPT | Performed by: OBSTETRICS & GYNECOLOGY

## 2024-11-08 PROCEDURE — 76811 OB US DETAILED SNGL FETUS: CPT | Performed by: OBSTETRICS & GYNECOLOGY

## 2024-11-08 RX ORDER — PNV NO.95/FERROUS FUM/FOLIC AC 28MG-0.8MG
TABLET ORAL
COMMUNITY
Start: 2024-10-15

## 2024-11-08 NOTE — PROGRESS NOTES
[facility] SPECIALTY CARE [facility] MATERNAL FETAL MEDICINE  [address] [place] [id number]  Dept: [phone]  Loc: [phone]     2024    RE:  Jayden Pena     : 1995   AGE: 29 y.o.     Dear Dr. Bay,    Thank you for allowing me to see Jayden Pena.  As I'm sure you will recall, Jayden Pena is a 29 y.o. [id number]'s last menstrual period was 2024. Estimated Date of Delivery: 3/19/25 at 21w2d seen in our office today for the following:    REASON FOR VISIT: Level II    Patient Active Problem List    Diagnosis Date Noted    Psychotic disorder (HCC) 2022    Fifth pregnancy 2024    21 weeks gestation of pregnancy 2024    Hx of  delivery, currently pregnant, second trimester 2024    Encounter for fetal anatomic survey     Tumors of body of uterus, antepartum condition or complication 2015        PAST HISTORY:  OB History    Para Term  AB Living   5 4 3 1   4   SAB IAB Ectopic Molar Multiple Live Births           0 4      # Outcome Date GA Lbr Momo/2nd Weight Sex Type Anes PTL Lv   5 Current            4 Term 22 37w6d 03:30 / 00:07 3.32 kg (7 lb 5.1 oz) M Vag-Spont None N MARIA C   3  02/10/20 35w6d  2.5 kg (5 lb 8.2 oz) F Vag-Spont None Y MARIA C      Complications:  Labor   2 Term 18 38w4d  3.515 kg (7 lb 12 oz) M Vag-Spont None N MARIA C   1 Term 16 38w3d  3.3 kg (7 lb 4.4 oz) M Vag-Spont EPI  MARIA C          MEDICAL:  Past Medical History:   Diagnosis Date    Allergic     seasonal    Bipolar 1 disorder (HCC) 2022    Emotional disorder ([facility])     Rh sensitized     Traumatic injury during pregnancy, third trimester         SURGICAL:  History reviewed. No pertinent surgical history.    ALLERGIES:    No Known Allergies      MEDICATIONS:    Current Outpatient Medications   Medication Sig Dispense Refill    Prenatal Vit-Fe Fumarate-FA (PRENATAL VITAMINS) 28-0.8 MG

## 2024-11-08 NOTE — PATIENT INSTRUCTIONS
Please arrive for your scheduled appointment at least 15 minutes early with your actual insurance card+ a photo ID. Also if you need any refills ordered or have questions, it may take up 48 hours to reply. Please allow ample time for your refills. Call me when you use last refill. Thank you for your cooperation. You might be having an NST at your next appt. Please eat a large snack or breakfast before coming to office. Thank youCall your primary obstetrician with bleeding, leaking of fluid, abdominal tenderness, headache, blurry vision, epigastric pain and increased urinary frequency.If you are experiencing an emergency and need immediate help, call 911 or go to go emergency room or labor and delivery. Do kick counts after dinner. Call your primary obstetrician if less than 10 kicks in 2 hours after dinner.     Call your primary obstetrician with bleeding, leaking of fluid, abdominal tenderness, headache, blurry vision, epigastric pain and increased urinary frequency.if you are sick, not feeling well or have an infectious process going on please reschedule your appointment by calling [phone]. Also if any family members are not feeling well, please do not bring them to your appointment. We appreciate your cooperation. We are doing this in order to protect our pregnant mothers+ their babies.if you are sick, not feeling well or have an infectious process going on please reschedule your appointment by calling [phone]. Also if any family members are not feeling well, please do not bring them to your appointment. We appreciate your cooperation. We are doing this in order to protect our pregnant mothers+ their babies.

## 2024-11-08 NOTE — PROGRESS NOTES
Pt alert and pleasant, here for new patient appointment. Pt denies bleeding, cramping, and lof. Positive fetal movement per patient.

## 2024-11-14 ENCOUNTER — HOSPITAL ENCOUNTER (OUTPATIENT)
Age: 29
Discharge: HOME | End: 2024-11-15
Attending: OBSTETRICS & GYNECOLOGY | Admitting: OBSTETRICS & GYNECOLOGY
Payer: COMMERCIAL

## 2024-11-15 VITALS
WEIGHT: 180 LBS | RESPIRATION RATE: 16 BRPM | TEMPERATURE: 99 F | SYSTOLIC BLOOD PRESSURE: 122 MMHG | BODY MASS INDEX: 30.73 KG/M2 | DIASTOLIC BLOOD PRESSURE: 57 MMHG | HEART RATE: 94 BPM | HEIGHT: 64 IN

## 2024-11-15 PROBLEM — Z3A.22 22 WEEKS GESTATION OF PREGNANCY: Status: ACTIVE | Noted: 2024-11-15

## 2024-11-15 PROCEDURE — 6370000000 HC RX 637 (ALT 250 FOR IP): Performed by: MIDWIFE

## 2024-11-15 PROCEDURE — 99202 OFFICE O/P NEW SF 15 MIN: CPT

## 2024-11-15 PROCEDURE — 99221 1ST HOSP IP/OBS SF/LOW 40: CPT | Performed by: MIDWIFE

## 2024-11-15 RX ORDER — FERROUS SULFATE 325(65) MG
325 TABLET ORAL ONCE
Status: COMPLETED | OUTPATIENT
Start: 2024-11-15 | End: 2024-11-15

## 2024-11-15 RX ORDER — ACETAMINOPHEN 325 MG/1
650 TABLET ORAL ONCE
Status: COMPLETED | OUTPATIENT
Start: 2024-11-15 | End: 2024-11-15

## 2024-11-15 RX ADMIN — ACETAMINOPHEN 650 MG: 325 TABLET ORAL at 00:44

## 2024-11-15 RX ADMIN — FERROUS SULFATE TAB 325 MG (65 MG ELEMENTAL FE) 325 MG: 325 (65 FE) TAB at 00:44

## 2024-11-15 ASSESSMENT — PAIN SCALES - GENERAL: PAINLEVEL_OUTOF10: 7

## 2024-11-15 NOTE — H&P
of breath/wheezing) (Patient not taking: Reported on 2023)  melatonin 3 MG TABS tablet, Take 1 tablet by mouth nightly  ferrous sulfate (IRON 325) 325 (65 Fe) MG tablet, Take 1 tablet by mouth 2 times daily (with meals) (Patient not taking: Reported on 2022)  docusate sodium (COLACE) 100 mg capsule, Take 1 capsule by mouth 2 times daily (Patient not taking: Reported on 2022)  fluticasone (FLONASE) 50 MCG/ACT nasal spray, 1 spray by Each Nostril route daily (Patient not taking: Reported on 2022)  Prenatal Vit-Fe Fumarate-FA (PRENATAL VITAMIN) 27-1 MG TABS tablet,     REVIEW OF SYSTEMS:  CONSTITUTIONAL:  negative  RESPIRATORY:  negative  CARDIOVASCULAR:  negative  GASTROINTESTINAL:  negative  ALLERGIC/IMMUNOLOGIC:  negative  NEUROLOGICAL:  negative  BEHAVIOR/PSYCH:  negative    PHYSICAL EXAM:  Vitals:    24 2337   BP: (!) 122/57   Pulse: 94   Resp: 16   Temp: 99 °F (37.2 °C)   TempSrc: Oral   Weight: 81.6 kg (180 lb)   Height: 1.626 m (5' 4\")     General appearance:  awake, alert, cooperative, no apparent distress, and appears stated age  Skin: warm, dry, normal color, no rash  Heart:  Regular rate & rhythm, S1 S2, no murmurs, rubs, or gallops  Lungs:  No increased work of breathing, good air exchange, CTA b/l.  Abdomen:  Soft, non tender, gravid, consistent with her gestational age  Extremities: No clubbing, cyanosis, cords; No calf tenderness; Edema: no  Fetal heart rate:  Reassuring.  Pelvis:  deferred  Cervix:  deferred  Contraction frequency:  none  Membranes:  Intact    Labs:   No results found for this or any previous visit (from the past 72 hour(s)).    ASSESSMENT:   29 y.o.  female at 22w2d     Patient Active Problem List   Diagnosis    Tumors of body of uterus, antepartum condition or complication    Encounter for fetal anatomic survey    Psychotic disorder (HCC)    Fifth pregnancy    21 weeks gestation of pregnancy    Hx of  delivery, currently pregnant, second  trimester     Fetus: Reassuring  GBS: not done    PLAN:  Discussed with Dr. Bay  Iron supplement  Discharge to home    Chiara Greene, BARBY - JONAS, FACNM  11/15/24 12:24 AM EST

## 2024-11-15 NOTE — PROGRESS NOTES
Pt presents by squad from the rescue mission. Pt is 22w1d  for feeling like she was going to misscarriage, had abdominal cramping earlier which has resolved, and has nausea but not vomiting. Pt denies LOF, VB. Placed on toco. Spot check . Pt also complains of tooth ache on her right side.

## 2024-11-15 NOTE — PROGRESS NOTES
Pt given written and verbal discharge instructions. Pt is awaiting Provide a Ride. Waiting in the waiting room for transportation to the Rescue mission.

## 2024-11-16 ENCOUNTER — HOSPITAL ENCOUNTER (EMERGENCY)
Age: 29
Discharge: HOME | End: 2024-11-16
Payer: COMMERCIAL

## 2024-11-16 VITALS
TEMPERATURE: 98.3 F | RESPIRATION RATE: 16 BRPM | SYSTOLIC BLOOD PRESSURE: 136 MMHG | OXYGEN SATURATION: 100 % | HEART RATE: 94 BPM | DIASTOLIC BLOOD PRESSURE: 61 MMHG

## 2024-11-16 DIAGNOSIS — K04.7 DENTAL INFECTION: ICD-10-CM

## 2024-11-16 DIAGNOSIS — K08.89 PAIN, DENTAL: Primary | ICD-10-CM

## 2024-11-16 PROCEDURE — 99283 EMERGENCY DEPT VISIT LOW MDM: CPT

## 2024-11-16 PROCEDURE — 6370000000 HC RX 637 (ALT 250 FOR IP): Performed by: PHYSICIAN ASSISTANT

## 2024-11-16 RX ORDER — ACETAMINOPHEN 500 MG
500 TABLET ORAL EVERY 6 HOURS PRN
Qty: 30 TABLET | Refills: 0 | Status: SHIPPED | OUTPATIENT
Start: 2024-11-16

## 2024-11-16 RX ORDER — HYDROCODONE BITARTRATE AND ACETAMINOPHEN 5; 325 MG/1; MG/1
1 TABLET ORAL ONCE
Status: COMPLETED | OUTPATIENT
Start: 2024-11-16 | End: 2024-11-16

## 2024-11-16 RX ORDER — LIDOCAINE HYDROCHLORIDE 20 MG/ML
10 SOLUTION OROPHARYNGEAL PRN
Qty: 500 ML | Refills: 0 | Status: SHIPPED | OUTPATIENT
Start: 2024-11-16

## 2024-11-16 RX ADMIN — HYDROCODONE BITARTRATE AND ACETAMINOPHEN 1 TABLET: 5; 325 TABLET ORAL at 19:17

## 2024-11-16 RX ADMIN — AMOXICILLIN AND CLAVULANATE POTASSIUM 1 TABLET: 875; 125 TABLET, FILM COATED ORAL at 19:17

## 2024-11-16 ASSESSMENT — PAIN DESCRIPTION - ORIENTATION: ORIENTATION: RIGHT

## 2024-11-16 ASSESSMENT — LIFESTYLE VARIABLES
HOW OFTEN DO YOU HAVE A DRINK CONTAINING ALCOHOL: NEVER
HOW MANY STANDARD DRINKS CONTAINING ALCOHOL DO YOU HAVE ON A TYPICAL DAY: PATIENT DOES NOT DRINK

## 2024-11-16 ASSESSMENT — PAIN DESCRIPTION - LOCATION: LOCATION: MOUTH

## 2024-11-16 ASSESSMENT — PAIN SCALES - GENERAL: PAINLEVEL_OUTOF10: 10

## 2024-11-16 ASSESSMENT — PAIN DESCRIPTION - DESCRIPTORS: DESCRIPTORS: THROBBING;SHARP;ACHING

## 2024-11-17 NOTE — ED PROVIDER NOTES
Independent [facility] Visit.      Department of Emergency Medicine   ED  Provider Note  Admit Date/RoomTime: 11/16/2024  7:02 PM  ED Room: PR4/PR4    CHIEF COMPLAINT:   Chief Complaint   Patient presents with    Dental Pain     Right sided dental pain x4 days. Attempted to get into dentist, unable to get appointment until January. No relief from Tylenol or Orajel      ---------------------------------HISTORY OF PRESENT ILLNESS-----------------------------------     Jayden Pena is a 29 y.o. female presenting to the ED for dental pain.  Patient states pain is located to her right upper molar and has been ongoing for the past 4 days.  She states she did call a dentist 3 weeks ago to try to get an appointment but was unable to get in until January.  Patient denies fever.  She states she does have slight swelling to the right side of her face.  She is denying any difficulty with breathing, swallowing, or handling her secretions.  Patient denies chest pain, shortness of breath, abdominal pain, nausea, vomiting, or headache.  She is alert and oriented x 3 and in no apparent distress at this exam.  She is nontoxic-appearing.    Asked patient if there is any concern for pregnancy and patient states no. However, upon chart review, patient is 22 weeks pregnant and had an appointment with her OB yesterday.     PCP: No primary care provider on file.  Dentist: None    Review of Systems:   Pertinent positives and negatives are stated within HPI, all other systems reviewed and are negative.    --------------------------------------------- PAST HISTORY ---------------------------------------------    Past Medical History:  has a past medical history of Allergic, Anemia, Anxiety, Bipolar 1 disorder (HCC), Emotional disorder (HCC), Rh sensitized, and Traumatic injury during pregnancy, third trimester.    Past Surgical History:  has no past surgical history on file.    Social History:  reports that she quit smoking about 9 years ago. Her

## 2025-01-08 ENCOUNTER — HOSPITAL ENCOUNTER (EMERGENCY)
Age: 30
Discharge: HOME | End: 2025-01-08
Payer: COMMERCIAL

## 2025-01-08 VITALS
RESPIRATION RATE: 16 BRPM | OXYGEN SATURATION: 100 % | HEART RATE: 97 BPM | DIASTOLIC BLOOD PRESSURE: 64 MMHG | TEMPERATURE: 98 F | SYSTOLIC BLOOD PRESSURE: 121 MMHG

## 2025-01-08 DIAGNOSIS — J06.9 UPPER RESPIRATORY TRACT INFECTION, UNSPECIFIED TYPE: Primary | ICD-10-CM

## 2025-01-08 DIAGNOSIS — J02.9 SORE THROAT: ICD-10-CM

## 2025-01-08 LAB
FLUAV RNA RESP QL NAA+PROBE: NOT DETECTED
FLUBV RNA RESP QL NAA+PROBE: NOT DETECTED
SARS-COV-2 RNA RESP QL NAA+PROBE: NOT DETECTED
SOURCE: NORMAL
SPECIMEN DESCRIPTION: NORMAL
SPECIMEN SOURCE: NORMAL
STREP A, MOLECULAR: NEGATIVE

## 2025-01-08 PROCEDURE — 87651 STREP A DNA AMP PROBE: CPT

## 2025-01-08 PROCEDURE — 99283 EMERGENCY DEPT VISIT LOW MDM: CPT

## 2025-01-08 PROCEDURE — 6370000000 HC RX 637 (ALT 250 FOR IP)

## 2025-01-08 PROCEDURE — 87636 SARSCOV2 & INF A&B AMP PRB: CPT

## 2025-01-08 RX ORDER — LIDOCAINE HYDROCHLORIDE 20 MG/ML
15 SOLUTION OROPHARYNGEAL ONCE
Status: COMPLETED | OUTPATIENT
Start: 2025-01-08 | End: 2025-01-08

## 2025-01-08 RX ORDER — AMOXICILLIN 500 MG/1
500 CAPSULE ORAL 2 TIMES DAILY
Qty: 20 CAPSULE | Refills: 0 | Status: SHIPPED | OUTPATIENT
Start: 2025-01-08 | End: 2025-01-18

## 2025-01-08 RX ORDER — GUAIFENESIN 600 MG/1
600 TABLET, EXTENDED RELEASE ORAL 2 TIMES DAILY
Qty: 30 TABLET | Refills: 0 | Status: SHIPPED | OUTPATIENT
Start: 2025-01-08 | End: 2025-01-23

## 2025-01-08 RX ORDER — FLUTICASONE PROPIONATE 50 MCG
1 SPRAY, SUSPENSION (ML) NASAL DAILY
Qty: 16 G | Refills: 0 | Status: SHIPPED | OUTPATIENT
Start: 2025-01-08

## 2025-01-08 RX ORDER — ACETAMINOPHEN 500 MG
1000 TABLET ORAL ONCE
Status: COMPLETED | OUTPATIENT
Start: 2025-01-08 | End: 2025-01-08

## 2025-01-08 RX ADMIN — ACETAMINOPHEN 1000 MG: 500 TABLET, FILM COATED ORAL at 13:12

## 2025-01-08 RX ADMIN — LIDOCAINE HYDROCHLORIDE 15 ML: 20 SOLUTION ORAL at 13:12

## 2025-01-08 NOTE — DISCHARGE INSTRUCTIONS
If you develop new or worsening symptoms, return to the emergency department for reevaluation.  This still may be a viral upper respiratory infection.  I think it is too early to start antibiotics.  However, since you are adamant about having an antibiotic, I did send amoxicillin to your pharmacy.  Take this as prescribed.  I also sent a nasal spray Mucinex for cough and congestion.  You need to work on establishing with a primary care provider.  Resources provided to you in your discharge paperwork.  Take tylenol for pain

## 2025-01-09 NOTE — ED PROVIDER NOTES
[facility]    Specimen Description .NASOPHARYNGEAL SWAB     Source .NASOPHARYNGEAL SWAB     SARS-CoV-2 RNA, RT PCR Not Detected Not Detected    Influenza A Not Detected Not Detected    Influenza B Not Detected Not Detected       Imaging:  All Radiology results interpreted by Radiologist unless otherwise noted.  No orders to display       ED Course / Medical Decision Making     Medications   acetaminophen (TYLENOL) tablet 1,000 mg (1,000 mg Oral Given 1/8/25 1312)   lidocaine viscous hcl (XYLOCAINE) 2 % solution 15 mL (15 mLs Mouth/Throat Given 1/8/25 1312)     ED Course as of 01/08/25 2121 Wed Jan 08, 2025   1704 Results discussed with patient.  Discussed symptomatic treatment.  Patient is unhappy with symptomatic treatment.  She is adamant that she needs an antibiotic.  Explained viral versus bacterial upper respiratory infections and length of viral URIs.  Discussed risk versus benefits of antibiotics, specifically when URI is viral.  Patient understands all the risks and education that was provided to her but she still wishes to have an antibiotic.  Will provide her with a course of amoxicillin.  Explained to her all the side effects of taking amoxicillin.  She is understanding of all education.  She is now agreeable discharge, treatment, follow-up plan. [BA]      ED Course User Index  [BA] Omar Almeida, APRN - NP     Re-examination:  1/8/25       Time: 1704   Patient’s condition remains stable.    Consults:   None    Procedures:   none    Medical Decision Making:   Differential diagnoses include but are not limited to: COVID, influenza, strep, URI    Recap:  - Patient is a well-appearing 29-year-old female presents emergency department complaining of upper respiratory infection.  She states she has a sore and \"scratchy\" throat and cough.  She is having nasal congestion and rhinorrhea.  She states she has had a couple episodes of nausea and vomiting due to her sore throat and coughing.  She is 29 weeks

## 2025-01-14 ENCOUNTER — HOSPITAL ENCOUNTER (EMERGENCY)
Age: 30
Discharge: HOME | End: 2025-01-14
Payer: COMMERCIAL

## 2025-01-14 VITALS
OXYGEN SATURATION: 99 % | BODY MASS INDEX: 30.55 KG/M2 | RESPIRATION RATE: 18 BRPM | WEIGHT: 178 LBS | HEART RATE: 100 BPM | TEMPERATURE: 97.5 F | SYSTOLIC BLOOD PRESSURE: 118 MMHG | DIASTOLIC BLOOD PRESSURE: 67 MMHG

## 2025-01-14 DIAGNOSIS — O99.013 ANEMIA IN PREGNANCY, THIRD TRIMESTER: ICD-10-CM

## 2025-01-14 DIAGNOSIS — O99.713 PRURITUS OF PREGNANCY IN THIRD TRIMESTER: Primary | ICD-10-CM

## 2025-01-14 DIAGNOSIS — L29.9 PRURITUS OF PREGNANCY IN THIRD TRIMESTER: Primary | ICD-10-CM

## 2025-01-14 LAB
ALBUMIN SERPL-MCNC: 3.8 G/DL (ref 3.5–5.2)
ALP SERPL-CCNC: 105 U/L (ref 35–104)
ALT SERPL-CCNC: <5 U/L (ref 0–32)
ANION GAP SERPL CALCULATED.3IONS-SCNC: 9 MMOL/L (ref 7–16)
AST SERPL-CCNC: 10 U/L (ref 0–31)
BACTERIA URNS QL MICRO: ABNORMAL
BASOPHILS # BLD: 0.02 K/UL (ref 0–0.2)
BASOPHILS NFR BLD: 0 % (ref 0–2)
BILIRUB DIRECT SERPL-MCNC: <0.2 MG/DL (ref 0–0.3)
BILIRUB INDIRECT SERPL-MCNC: ABNORMAL MG/DL (ref 0–1)
BILIRUB SERPL-MCNC: <0.2 MG/DL (ref 0–1.2)
BILIRUB UR QL STRIP: NEGATIVE
BUN SERPL-MCNC: 8 MG/DL (ref 6–20)
CALCIUM SERPL-MCNC: 9.4 MG/DL (ref 8.6–10.2)
CHLORIDE SERPL-SCNC: 102 MMOL/L (ref 98–107)
CLARITY UR: CLEAR
CO2 SERPL-SCNC: 25 MMOL/L (ref 22–29)
COLOR UR: YELLOW
CREAT SERPL-MCNC: 0.5 MG/DL (ref 0.5–1)
EOSINOPHIL # BLD: 0.06 K/UL (ref 0.05–0.5)
EOSINOPHILS RELATIVE PERCENT: 1 % (ref 0–6)
EPI CELLS #/AREA URNS HPF: ABNORMAL /HPF
ERYTHROCYTE [DISTWIDTH] IN BLOOD BY AUTOMATED COUNT: 14 % (ref 11.5–15)
GFR, ESTIMATED: >90 ML/MIN/1.73M2
GLUCOSE SERPL-MCNC: 99 MG/DL (ref 74–99)
GLUCOSE UR STRIP-MCNC: NEGATIVE MG/DL
HCT VFR BLD AUTO: 30.6 % (ref 34–48)
HGB BLD-MCNC: 10.1 G/DL (ref 11.5–15.5)
HGB UR QL STRIP.AUTO: NEGATIVE
IMM GRANULOCYTES # BLD AUTO: 0.1 K/UL (ref 0–0.58)
IMM GRANULOCYTES NFR BLD: 1 % (ref 0–5)
KETONES UR STRIP-MCNC: NEGATIVE MG/DL
LEUKOCYTE ESTERASE UR QL STRIP: ABNORMAL
LYMPHOCYTES NFR BLD: 1.94 K/UL (ref 1.5–4)
LYMPHOCYTES RELATIVE PERCENT: 25 % (ref 20–42)
MCH RBC QN AUTO: 29.4 PG (ref 26–35)
MCHC RBC AUTO-ENTMCNC: 33 G/DL (ref 32–34.5)
MCV RBC AUTO: 89.2 FL (ref 80–99.9)
MONOCYTES NFR BLD: 0.44 K/UL (ref 0.1–0.95)
MONOCYTES NFR BLD: 6 % (ref 2–12)
NEUTROPHILS NFR BLD: 67 % (ref 43–80)
NEUTS SEG NFR BLD: 5.12 K/UL (ref 1.8–7.3)
NITRITE UR QL STRIP: NEGATIVE
PH UR STRIP: 6 [PH] (ref 5–9)
PLATELET # BLD AUTO: 194 K/UL (ref 130–450)
PMV BLD AUTO: 11.6 FL (ref 7–12)
POTASSIUM SERPL-SCNC: 4.2 MMOL/L (ref 3.5–5)
PROT SERPL-MCNC: 7.1 G/DL (ref 6.4–8.3)
PROT UR STRIP-MCNC: NEGATIVE MG/DL
RBC # BLD AUTO: 3.43 M/UL (ref 3.5–5.5)
RBC #/AREA URNS HPF: ABNORMAL /HPF
SODIUM SERPL-SCNC: 136 MMOL/L (ref 132–146)
SP GR UR STRIP: 1.01 (ref 1–1.03)
UROBILINOGEN UR STRIP-ACNC: 0.2 EU/DL (ref 0–1)
WBC #/AREA URNS HPF: ABNORMAL /HPF
WBC OTHER # BLD: 7.7 K/UL (ref 4.5–11.5)

## 2025-01-14 PROCEDURE — 6370000000 HC RX 637 (ALT 250 FOR IP): Performed by: PHYSICIAN ASSISTANT

## 2025-01-14 PROCEDURE — 87086 URINE CULTURE/COLONY COUNT: CPT

## 2025-01-14 PROCEDURE — 85025 COMPLETE CBC W/AUTO DIFF WBC: CPT

## 2025-01-14 PROCEDURE — 82248 BILIRUBIN DIRECT: CPT

## 2025-01-14 PROCEDURE — 80053 COMPREHEN METABOLIC PANEL: CPT

## 2025-01-14 PROCEDURE — 81001 URINALYSIS AUTO W/SCOPE: CPT

## 2025-01-14 PROCEDURE — 99283 EMERGENCY DEPT VISIT LOW MDM: CPT

## 2025-01-14 RX ORDER — GUAIFENESIN 600 MG/1
600 TABLET, EXTENDED RELEASE ORAL 2 TIMES DAILY
Qty: 30 TABLET | Refills: 0 | Status: SHIPPED | OUTPATIENT
Start: 2025-01-14 | End: 2025-01-29

## 2025-01-14 RX ORDER — DIPHENHYDRAMINE HCL 25 MG
25 CAPSULE ORAL 3 TIMES DAILY PRN
Qty: 30 CAPSULE | Refills: 0 | Status: SHIPPED | OUTPATIENT
Start: 2025-01-14 | End: 2025-01-24

## 2025-01-14 RX ORDER — AMOXICILLIN 500 MG/1
500 CAPSULE ORAL 2 TIMES DAILY
Qty: 20 CAPSULE | Refills: 0 | Status: SHIPPED | OUTPATIENT
Start: 2025-01-14 | End: 2025-01-24

## 2025-01-14 RX ORDER — DIPHENHYDRAMINE HCL 25 MG
25 TABLET ORAL ONCE
Status: COMPLETED | OUTPATIENT
Start: 2025-01-14 | End: 2025-01-14

## 2025-01-14 RX ADMIN — DIPHENHYDRAMINE HYDROCHLORIDE 25 MG: 25 TABLET ORAL at 08:45

## 2025-01-14 ASSESSMENT — PAIN - FUNCTIONAL ASSESSMENT: PAIN_FUNCTIONAL_ASSESSMENT: NONE - DENIES PAIN

## 2025-01-14 NOTE — DISCHARGE INSTRUCTIONS
Your labs are found to be negative please follow-up with Dr. Bay and take the Benadryl.  Continue you iron please take it every other day.  Please take Colace and MiraLAX as needed

## 2025-01-15 LAB
MICROORGANISM SPEC CULT: ABNORMAL
SERVICE CMNT-IMP: ABNORMAL
SPECIMEN DESCRIPTION: ABNORMAL

## 2025-01-24 ENCOUNTER — HOSPITAL ENCOUNTER (EMERGENCY)
Age: 30
Discharge: HOME | End: 2025-01-24
Payer: COMMERCIAL

## 2025-01-24 VITALS
OXYGEN SATURATION: 96 % | HEART RATE: 91 BPM | TEMPERATURE: 98.6 F | DIASTOLIC BLOOD PRESSURE: 75 MMHG | RESPIRATION RATE: 16 BRPM | SYSTOLIC BLOOD PRESSURE: 114 MMHG

## 2025-01-24 DIAGNOSIS — J10.1 INFLUENZA A: Primary | ICD-10-CM

## 2025-01-24 DIAGNOSIS — O21.9 VOMITING OF PREGNANCY, ANTEPARTUM: ICD-10-CM

## 2025-01-24 LAB
ALBUMIN SERPL-MCNC: 3.5 G/DL (ref 3.5–5.2)
ALP SERPL-CCNC: 135 U/L (ref 35–104)
ALT SERPL-CCNC: 13 U/L (ref 0–32)
ANION GAP SERPL CALCULATED.3IONS-SCNC: 14 MMOL/L (ref 7–16)
AST SERPL-CCNC: 24 U/L (ref 0–31)
BASOPHILS # BLD: 0.01 K/UL (ref 0–0.2)
BASOPHILS NFR BLD: 0 % (ref 0–2)
BILIRUB SERPL-MCNC: 0.2 MG/DL (ref 0–1.2)
BUN SERPL-MCNC: 7 MG/DL (ref 6–20)
CALCIUM SERPL-MCNC: 8.9 MG/DL (ref 8.6–10.2)
CHLORIDE SERPL-SCNC: 100 MMOL/L (ref 98–107)
CO2 SERPL-SCNC: 19 MMOL/L (ref 22–29)
CREAT SERPL-MCNC: 0.6 MG/DL (ref 0.5–1)
EKG ATRIAL RATE: 116 BPM
EKG P AXIS: 43 DEGREES
EKG P-R INTERVAL: 140 MS
EKG Q-T INTERVAL: 338 MS
EKG QRS DURATION: 74 MS
EKG QTC CALCULATION (BAZETT): 469 MS
EKG R AXIS: 44 DEGREES
EKG T AXIS: 16 DEGREES
EKG VENTRICULAR RATE: 116 BPM
EOSINOPHIL # BLD: 0.06 K/UL (ref 0.05–0.5)
EOSINOPHILS RELATIVE PERCENT: 1 % (ref 0–6)
ERYTHROCYTE [DISTWIDTH] IN BLOOD BY AUTOMATED COUNT: 14.3 % (ref 11.5–15)
FLUAV RNA RESP QL NAA+PROBE: DETECTED
FLUBV RNA RESP QL NAA+PROBE: NOT DETECTED
GFR, ESTIMATED: >90 ML/MIN/1.73M2
GLUCOSE SERPL-MCNC: 77 MG/DL (ref 74–99)
HCT VFR BLD AUTO: 31.3 % (ref 34–48)
HGB BLD-MCNC: 10 G/DL (ref 11.5–15.5)
IMM GRANULOCYTES # BLD AUTO: 0.03 K/UL (ref 0–0.58)
IMM GRANULOCYTES NFR BLD: 1 % (ref 0–5)
LYMPHOCYTES NFR BLD: 1.22 K/UL (ref 1.5–4)
LYMPHOCYTES RELATIVE PERCENT: 25 % (ref 20–42)
MCH RBC QN AUTO: 28.8 PG (ref 26–35)
MCHC RBC AUTO-ENTMCNC: 31.9 G/DL (ref 32–34.5)
MCV RBC AUTO: 90.2 FL (ref 80–99.9)
MONOCYTES NFR BLD: 0.45 K/UL (ref 0.1–0.95)
MONOCYTES NFR BLD: 9 % (ref 2–12)
NEUTROPHILS NFR BLD: 64 % (ref 43–80)
NEUTS SEG NFR BLD: 3.16 K/UL (ref 1.8–7.3)
PLATELET # BLD AUTO: 203 K/UL (ref 130–450)
PMV BLD AUTO: 11 FL (ref 7–12)
POTASSIUM SERPL-SCNC: 3.7 MMOL/L (ref 3.5–5)
PROT SERPL-MCNC: 7.1 G/DL (ref 6.4–8.3)
RBC # BLD AUTO: 3.47 M/UL (ref 3.5–5.5)
SARS-COV-2 RNA RESP QL NAA+PROBE: NOT DETECTED
SODIUM SERPL-SCNC: 133 MMOL/L (ref 132–146)
SOURCE: ABNORMAL
SPECIMEN DESCRIPTION: ABNORMAL
TROPONIN I SERPL HS-MCNC: <6 NG/L (ref 0–9)
WBC OTHER # BLD: 4.9 K/UL (ref 4.5–11.5)

## 2025-01-24 PROCEDURE — 93010 ELECTROCARDIOGRAM REPORT: CPT | Performed by: INTERNAL MEDICINE

## 2025-01-24 PROCEDURE — 2580000003 HC RX 258: Performed by: PHYSICIAN ASSISTANT

## 2025-01-24 PROCEDURE — 87636 SARSCOV2 & INF A&B AMP PRB: CPT

## 2025-01-24 PROCEDURE — 96360 HYDRATION IV INFUSION INIT: CPT

## 2025-01-24 PROCEDURE — 80053 COMPREHEN METABOLIC PANEL: CPT

## 2025-01-24 PROCEDURE — 99284 EMERGENCY DEPT VISIT MOD MDM: CPT

## 2025-01-24 PROCEDURE — 93005 ELECTROCARDIOGRAM TRACING: CPT | Performed by: PHYSICIAN ASSISTANT

## 2025-01-24 PROCEDURE — 84484 ASSAY OF TROPONIN QUANT: CPT

## 2025-01-24 PROCEDURE — 85025 COMPLETE CBC W/AUTO DIFF WBC: CPT

## 2025-01-24 RX ORDER — ONDANSETRON 4 MG/1
4 TABLET, ORALLY DISINTEGRATING ORAL 3 TIMES DAILY PRN
Qty: 21 TABLET | Refills: 0 | Status: SHIPPED | OUTPATIENT
Start: 2025-01-24

## 2025-01-24 RX ORDER — 0.9 % SODIUM CHLORIDE 0.9 %
1000 INTRAVENOUS SOLUTION INTRAVENOUS ONCE
Status: COMPLETED | OUTPATIENT
Start: 2025-01-24 | End: 2025-01-24

## 2025-01-24 RX ADMIN — SODIUM CHLORIDE 1000 ML: 9 INJECTION, SOLUTION INTRAVENOUS at 09:50

## 2025-01-24 NOTE — ED PROVIDER NOTES
Independent [facility] Visit.         Department of Emergency Medicine   ED  Provider Note  Admit Date/RoomTime: 1/24/2025  8:40 AM  ED Room: 01/01            Chief Complaint:  concern for Influenza (N/V and diarrhea 3 days.  7 months pregnant. Dr. Bay.)      History of Present Illness:  Source of history provided by:  patient.  History/Exam Limitations: none.     Jayden Pena is a 29 y.o. old female presenting to the emergency department for cough, congestion, body aches, fever, chills, which occured 3 day(s) prior to arrival.  Since onset the symptoms have been persistent. Denies chest pain, shortness of breath, difficulty swallowing, difficulty breathing, neck pain, neck stiffness, or rash. Does report sick contacts as several people at the rescue mission have had similar symptoms. Does report fever, chills and body aches.  Patient states that she is 32 weeks gestation.  Reportedly G5, P0.  Follows with Dr. Bay for OB/GYN care.  Reports normal fetal movements.  Denies vaginal bleeding or discharge.  No abdominal pain, back pain, or pelvic cramping. Patient denies recent travel. Patient denies all other symptoms at this time.           Review of Systems:      Pertinent positives and negatives are stated within HPI, all other systems reviewed and are negative.        Past Medical History:  has a past medical history of Allergic, Anemia, Anxiety, Bipolar 1 disorder (HCC), Emotional disorder (HCC), Rh sensitized, and Traumatic injury during pregnancy, third trimester.  Past Surgical History:  has no past surgical history on file.  Social History:  reports that she quit smoking about 10 years ago. Her smoking use included cigarettes. She has never used smokeless tobacco. She reports that she does not drink alcohol and does not use drugs.  Family History: family history includes Hypertension in her father and mother.   Allergies: Patient has no known allergies.    Physical Exam:  Vital signs reviewed.  Constitutional:

## 2025-01-24 NOTE — PROGRESS NOTES
CLINICAL PHARMACY NOTE: MEDS TO BEDS    Total # of Prescriptions Filled: 1   The following medications were delivered to the patient:  Ondansetron 4 mg    Additional Documentation:   Jame Garcia RN picked up in the pharmacy

## 2025-01-24 NOTE — CARE COORDINATION
Social Work /Transition of Care:    Pt is discharged and in need a transportation back to the [facility].  SW arranged transport via pt's insurance ([facility], confirmation #[id number]).  Provide a Ride vehicle number [phone] will be transporting pt.  Pt remains in ED waiting room.

## 2025-02-04 ENCOUNTER — OFFICE VISIT (OUTPATIENT)
Dept: INTERNAL MEDICINE | Age: 30
End: 2025-02-04

## 2025-02-04 ENCOUNTER — SOCIAL WORK (OUTPATIENT)
Dept: INTERNAL MEDICINE | Age: 30
End: 2025-02-04

## 2025-02-04 VITALS
HEIGHT: 64 IN | DIASTOLIC BLOOD PRESSURE: 56 MMHG | OXYGEN SATURATION: 98 % | BODY MASS INDEX: 30.05 KG/M2 | SYSTOLIC BLOOD PRESSURE: 116 MMHG | WEIGHT: 176 LBS | RESPIRATION RATE: 14 BRPM | TEMPERATURE: 97.1 F | HEART RATE: 104 BPM

## 2025-02-04 DIAGNOSIS — Z59.9 FINANCIAL DIFFICULTIES: Primary | ICD-10-CM

## 2025-02-04 DIAGNOSIS — Z34.90 FIFTH PREGNANCY: ICD-10-CM

## 2025-02-04 DIAGNOSIS — Z34.93 THIRD TRIMESTER PREGNANCY: ICD-10-CM

## 2025-02-04 RX ORDER — AMOXICILLIN 500 MG/1
500 CAPSULE ORAL 2 TIMES DAILY
COMMUNITY
End: 2025-02-04

## 2025-02-04 RX ORDER — GUAIFENESIN 600 MG/1
1200 TABLET, EXTENDED RELEASE ORAL 2 TIMES DAILY
COMMUNITY

## 2025-02-04 RX ORDER — DIPHENHYDRAMINE HCL 25 MG
25 CAPSULE ORAL 3 TIMES DAILY PRN
COMMUNITY

## 2025-02-04 SDOH — ECONOMIC STABILITY: FOOD INSECURITY: WITHIN THE PAST 12 MONTHS, THE FOOD YOU BOUGHT JUST DIDN'T LAST AND YOU DIDN'T HAVE MONEY TO GET MORE.: NEVER TRUE

## 2025-02-04 SDOH — ECONOMIC STABILITY: FOOD INSECURITY: WITHIN THE PAST 12 MONTHS, YOU WORRIED THAT YOUR FOOD WOULD RUN OUT BEFORE YOU GOT MONEY TO BUY MORE.: NEVER TRUE

## 2025-02-04 SDOH — ECONOMIC STABILITY - INCOME SECURITY: PROBLEM RELATED TO HOUSING AND ECONOMIC CIRCUMSTANCES, UNSPECIFIED: Z59.9

## 2025-02-04 ASSESSMENT — ENCOUNTER SYMPTOMS
RESPIRATORY NEGATIVE: 1
GASTROINTESTINAL NEGATIVE: 1

## 2025-02-04 ASSESSMENT — PATIENT HEALTH QUESTIONNAIRE - PHQ9
SUM OF ALL RESPONSES TO PHQ QUESTIONS 1-9: 0
SUM OF ALL RESPONSES TO PHQ9 QUESTIONS 1 & 2: 0
SUM OF ALL RESPONSES TO PHQ QUESTIONS 1-9: 0
1. LITTLE INTEREST OR PLEASURE IN DOING THINGS: NOT AT ALL
SUM OF ALL RESPONSES TO PHQ QUESTIONS 1-9: 0
2. FEELING DOWN, DEPRESSED OR HOPELESS: NOT AT ALL
SUM OF ALL RESPONSES TO PHQ QUESTIONS 1-9: 0

## 2025-02-04 NOTE — PATIENT INSTRUCTIONS
[place] Housing Resources*  (Call United [employer]/211 if need more resources.)     [facility]  What they offer: Housing for elderly, disabled, handicapped, moderate and low-income families; rental assistance under Section 8 program (Housing Choice Voucher Program). Call for application information.  [facility] Phone number: [phone]  Website: [employer]  [facility] Phone Number: [phone]  Website: [employer]  [facility] Phone Number: [phone]  Website: [employer]                 [facility]:  What they offer: shelter for women and children as survivors of domestic violence in [facility]  Phone number: [phone]               [facility]:  What they offer: shelter for women and children as survivors of domestic violence in [facility]  Phone Number: [phone]  Website: [employer]    [facility] DOMESTIC VIOLENCE SERVICES:  What they offer: shelter for women and children as survivors of domestic violence in [facility]  Phone Number: [phone]  Website: [employer]           HOMELESS PROGRAM [facility]  What they offer: Assists homeless persons or families that lack a fixed or regular nighttime residence; shelter houses homeless from 3-30 days. Extended stay optional depending upon housing situation  PHONE Number: [phone], [phone]  Website: [employer]    [facility]: [address] [place]  [address]  What they offer: Temporary shelter for homeless persons or families  Phone: Women and Family  [phone]       Men   [phone]  Website: [facility]org  [facility] [place] [facility]: [address] [address]  Phone Number: [phone]  [facility] [place]:  What they offer: Temporary shelter with hot meals for homeless persons 18 and older and for families.  Phone: [phone]  Website: [employer]  [facility] (women): 2940

## 2025-02-04 NOTE — PROGRESS NOTES
[facility]      SUBJECTIVE:  Jayden Pena (:  1995) is a 29 y.o. female here for evaluation of the following chief complaint(s):  Follow-up (Was in ER and here for follow up.Had the flu and a fever.  Needs PCP), Sinus Problem, and Anemia (During pregnancy, iron is low - takes iron pills)    Ms. Jayden Pena is here today for ED follow up and as a new patient.    She was seen on 25 in the ED due flu-like symptoms. She was tested positive for Influenza A. In addition, she had emesis gravidarum. She was discharged with zofran 4 mg TID as needed.     Gyn history:  -   - Last menstrual period: 24 - Estimated date of delivery: 3/19/25  - Last OB-GYN visit on 24 with Dr. Alfred Elise for initial prenatal visit.   - 1st, 2nd, 4rd pregnancy (term), 3rd pregnancy ( - 35 weeks 6 days)  - Follows as outpatient with Dr. Cristofer Bay, [facility], last visit 25, next appt 25   - no complications so far    Past medical history:  Bipolar disorder  Counseling James, started 2024, every 2 or 4 weeks  Not currently taking psych meds.     History of  delivery    Family History:  - Mom: no HTN, no DM  - Dad: no HTN, no DM  - Siblings: 4 siblings, no relevant PHMx    Social History:  - Smoking: smoked in the past, started teenage, THC, on and off, stopped   - Alcohol: occasionally, every 3 weeks.    Currently food stamp, living rescue [facility]. Housing [facility] [employer]. No children with her at this time.     Pap smear 2024 - need records to fax it over    Review of Systems   Constitutional: Negative.    HENT: Negative.     Respiratory: Negative.     Cardiovascular: Negative.    Gastrointestinal: Negative.    Genitourinary: Negative.    Musculoskeletal: Negative.    Neurological: Negative.    Psychiatric/Behavioral: Negative.         Current Outpatient Medications on File Prior

## 2025-02-04 NOTE — PROGRESS NOTES
[facility]  Internal Medicine Residency Clinic    Attending Physician Statement  I have discussed the case, including pertinent history and exam findings with the resident physician.I have seen and examined the patient and the key elements of the encounter have been performed by me. I agree with the assessment, plan and orders as documented by the resident. I have reviewed the relevant PMHx, PSHx, FamHx, SocialHx, medications, and allergies and updated history as appropriate.    Patient presents for a new patient appointment to establish care.    CRRR no murmur, motor equal bilaterally.  G5-P4-L4 EDC 3/19/2025  Pregnancy being managed elsewhere. Records to transfer.  LAbs reviewedAgree with plan.    Remainder of medical problems as per resident note.    Jonathan Kent, DO  2/4/2025 11:00 AM

## 2025-02-07 NOTE — PROGRESS NOTES
Consult during 2.4.25 IM office visit from Dr. Guanakito Olivas related to homelessness and pt is 34 weeks pregnant.  Met with pt who was unaccompanied to visit and travelled via community transportation.  Pt very open and engaged; good hygiene and appearance.      Chart reviewed and note 2 psychiatric admissions for psychosis and OB social work note     Pt has been residing at the [facility] since July 2024 after eviction from [facility] where she had resided for 5 years.  Pt reports much chaos in apartment complex as well as difficulties with children being destructive and leaving apartment due to their behavior issues. Pt is single, never .  Has 4 children whom she does not have custody of.    Lester Siegel (1.3.16) resides with pt's mother in the area     Carmella Malloy  12.31.18, Cyn Malloy (2.10.20) and Naeem Pena (6.5.22) are presently in custody of [facility] and residing in foster care homes.  FOB of present pregnancy is Autumn Black and also FOB of last 3 children.  States not in present relationship and   Pt's mother resides in [place] and father resides in [place].  Endorses some support but present distance feelings.  Pt reports present [facility] worker is Cyrus Godfrey ([phone])    Pt is unemployed; advises has used all OWF benefits and has requested emergency OWF funds with request to OB for letter; has [employer] insurance    Pt is receiving prenatal care with Dr Cristofer Bay.  Pt denies any substance use or misuse for alcohol or drugs.  Pt is very connected in the community with resources including: [facility], [facility], [facility] care seat program, [facility], housing thru [employer] and [facility].;has CHW thru [facility] (Elsi Pope) and [place]  Pt is active with [facility] with Wendy as counselor and Elsi who is assisting with employment.  Pt reports being compliant

## 2025-03-20 ENCOUNTER — HOSPITAL ENCOUNTER (INPATIENT)
Age: 30
LOS: 2 days | Discharge: HOME | DRG: 560 | End: 2025-03-22
Attending: OBSTETRICS & GYNECOLOGY | Admitting: OBSTETRICS & GYNECOLOGY
Payer: COMMERCIAL

## 2025-03-20 LAB
ABO + RH BLD: NORMAL
AMPHET UR QL SCN: NEGATIVE
ARM BAND NUMBER: NORMAL
BARBITURATES UR QL SCN: NEGATIVE
BENZODIAZ UR QL: NEGATIVE
BLOOD BANK SAMPLE EXPIRATION: NORMAL
BLOOD GROUP ANTIBODIES SERPL: NEGATIVE
BUPRENORPHINE UR QL: NEGATIVE
CANNABINOIDS UR QL SCN: NEGATIVE
COCAINE UR QL SCN: NEGATIVE
ERYTHROCYTE [DISTWIDTH] IN BLOOD BY AUTOMATED COUNT: 14.9 % (ref 11.5–15)
FENTANYL UR QL: NEGATIVE
HCT VFR BLD AUTO: 31.7 % (ref 34–48)
HGB BLD-MCNC: 9.9 G/DL (ref 11.5–15.5)
MCH RBC QN AUTO: 28.6 PG (ref 26–35)
MCHC RBC AUTO-ENTMCNC: 31.2 G/DL (ref 32–34.5)
MCV RBC AUTO: 91.6 FL (ref 80–99.9)
METHADONE UR QL: NEGATIVE
OPIATES UR QL SCN: NEGATIVE
OXYCODONE UR QL SCN: NEGATIVE
PCP UR QL SCN: NEGATIVE
PLATELET # BLD AUTO: 165 K/UL (ref 130–450)
PMV BLD AUTO: 12 FL (ref 7–12)
RBC # BLD AUTO: 3.46 M/UL (ref 3.5–5.5)
TEST INFORMATION: NORMAL
WBC OTHER # BLD: 5.7 K/UL (ref 4.5–11.5)

## 2025-03-20 PROCEDURE — 6370000000 HC RX 637 (ALT 250 FOR IP): Performed by: OBSTETRICS & GYNECOLOGY

## 2025-03-20 PROCEDURE — 1220000000 HC SEMI PRIVATE OB R&B

## 2025-03-20 PROCEDURE — 85027 COMPLETE CBC AUTOMATED: CPT

## 2025-03-20 PROCEDURE — 2500000003 HC RX 250 WO HCPCS: Performed by: OBSTETRICS & GYNECOLOGY

## 2025-03-20 PROCEDURE — 80307 DRUG TEST PRSMV CHEM ANLYZR: CPT

## 2025-03-20 PROCEDURE — 0HQ9XZZ REPAIR PERINEUM SKIN, EXTERNAL APPROACH: ICD-10-PCS | Performed by: OBSTETRICS & GYNECOLOGY

## 2025-03-20 PROCEDURE — APPNB30 APP NON BILLABLE TIME 0-30 MINS: Performed by: PHYSICIAN ASSISTANT

## 2025-03-20 PROCEDURE — 86900 BLOOD TYPING SEROLOGIC ABO: CPT

## 2025-03-20 PROCEDURE — 86901 BLOOD TYPING SEROLOGIC RH(D): CPT

## 2025-03-20 PROCEDURE — 7200000001 HC VAGINAL DELIVERY

## 2025-03-20 PROCEDURE — 6360000002 HC RX W HCPCS: Performed by: OBSTETRICS & GYNECOLOGY

## 2025-03-20 PROCEDURE — 86850 RBC ANTIBODY SCREEN: CPT

## 2025-03-20 RX ORDER — SODIUM CHLORIDE, SODIUM LACTATE, POTASSIUM CHLORIDE, AND CALCIUM CHLORIDE .6; .31; .03; .02 G/100ML; G/100ML; G/100ML; G/100ML
500 INJECTION, SOLUTION INTRAVENOUS PRN
OUTPATIENT
Start: 2025-03-20

## 2025-03-20 RX ORDER — FLUTICASONE PROPIONATE 50 MCG
1 SPRAY, SUSPENSION (ML) NASAL DAILY
Status: DISCONTINUED | OUTPATIENT
Start: 2025-03-20 | End: 2025-03-20 | Stop reason: CLARIF

## 2025-03-20 RX ORDER — SODIUM CHLORIDE 9 MG/ML
INJECTION, SOLUTION INTRAVENOUS PRN
Status: DISCONTINUED | OUTPATIENT
Start: 2025-03-20 | End: 2025-03-22 | Stop reason: HOSPADM

## 2025-03-20 RX ORDER — ONDANSETRON 2 MG/ML
4 INJECTION INTRAMUSCULAR; INTRAVENOUS EVERY 6 HOURS PRN
Status: DISCONTINUED | OUTPATIENT
Start: 2025-03-20 | End: 2025-03-22 | Stop reason: HOSPADM

## 2025-03-20 RX ORDER — ONDANSETRON 4 MG/1
4 TABLET, ORALLY DISINTEGRATING ORAL EVERY 6 HOURS PRN
Status: DISCONTINUED | OUTPATIENT
Start: 2025-03-20 | End: 2025-03-22 | Stop reason: HOSPADM

## 2025-03-20 RX ORDER — ACETAMINOPHEN 650 MG
TABLET, EXTENDED RELEASE ORAL
Status: DISCONTINUED
Start: 2025-03-20 | End: 2025-03-20

## 2025-03-20 RX ORDER — FLUTICASONE PROPIONATE 50 MCG
1 SPRAY, SUSPENSION (ML) NASAL DAILY PRN
Status: DISCONTINUED | OUTPATIENT
Start: 2025-03-20 | End: 2025-03-22 | Stop reason: HOSPADM

## 2025-03-20 RX ORDER — ALBUTEROL SULFATE 90 UG/1
2 INHALANT RESPIRATORY (INHALATION) EVERY 6 HOURS PRN
Status: DISCONTINUED | OUTPATIENT
Start: 2025-03-20 | End: 2025-03-20 | Stop reason: CLARIF

## 2025-03-20 RX ORDER — SODIUM CHLORIDE 0.9 % (FLUSH) 0.9 %
5-40 SYRINGE (ML) INJECTION PRN
Status: DISCONTINUED | OUTPATIENT
Start: 2025-03-20 | End: 2025-03-22 | Stop reason: HOSPADM

## 2025-03-20 RX ORDER — CARBOPROST TROMETHAMINE 250 UG/ML
250 INJECTION, SOLUTION INTRAMUSCULAR PRN
Status: DISCONTINUED | OUTPATIENT
Start: 2025-03-20 | End: 2025-03-22 | Stop reason: HOSPADM

## 2025-03-20 RX ORDER — SODIUM CHLORIDE 0.9 % (FLUSH) 0.9 %
5-40 SYRINGE (ML) INJECTION EVERY 12 HOURS SCHEDULED
OUTPATIENT
Start: 2025-03-20

## 2025-03-20 RX ORDER — DOCUSATE SODIUM 100 MG/1
100 CAPSULE, LIQUID FILLED ORAL 2 TIMES DAILY
Status: DISCONTINUED | OUTPATIENT
Start: 2025-03-20 | End: 2025-03-22 | Stop reason: HOSPADM

## 2025-03-20 RX ORDER — MISOPROSTOL 200 UG/1
400 TABLET ORAL PRN
Status: DISCONTINUED | OUTPATIENT
Start: 2025-03-20 | End: 2025-03-22 | Stop reason: HOSPADM

## 2025-03-20 RX ORDER — ALBUTEROL SULFATE 0.83 MG/ML
2.5 SOLUTION RESPIRATORY (INHALATION) EVERY 6 HOURS PRN
Status: DISCONTINUED | OUTPATIENT
Start: 2025-03-20 | End: 2025-03-22 | Stop reason: HOSPADM

## 2025-03-20 RX ORDER — ACETAMINOPHEN 500 MG
1000 TABLET ORAL EVERY 8 HOURS SCHEDULED
Status: DISCONTINUED | OUTPATIENT
Start: 2025-03-20 | End: 2025-03-22 | Stop reason: HOSPADM

## 2025-03-20 RX ORDER — TERBUTALINE SULFATE 1 MG/ML
0.25 INJECTION SUBCUTANEOUS
OUTPATIENT
Start: 2025-03-20

## 2025-03-20 RX ORDER — TRANEXAMIC ACID 10 MG/ML
1000 INJECTION, SOLUTION INTRAVENOUS
Status: DISCONTINUED | OUTPATIENT
Start: 2025-03-20 | End: 2025-03-22 | Stop reason: HOSPADM

## 2025-03-20 RX ORDER — SODIUM CHLORIDE 9 MG/ML
25 INJECTION, SOLUTION INTRAVENOUS PRN
OUTPATIENT
Start: 2025-03-20

## 2025-03-20 RX ORDER — SODIUM CHLORIDE 0.9 % (FLUSH) 0.9 %
5-40 SYRINGE (ML) INJECTION PRN
OUTPATIENT
Start: 2025-03-20

## 2025-03-20 RX ORDER — ONDANSETRON 4 MG/1
4 TABLET, ORALLY DISINTEGRATING ORAL EVERY 8 HOURS PRN
OUTPATIENT
Start: 2025-03-20

## 2025-03-20 RX ORDER — FERROUS SULFATE 325(65) MG
325 TABLET ORAL 2 TIMES DAILY WITH MEALS
Status: DISCONTINUED | OUTPATIENT
Start: 2025-03-20 | End: 2025-03-20 | Stop reason: SDUPTHER

## 2025-03-20 RX ORDER — SODIUM CHLORIDE 0.9 % (FLUSH) 0.9 %
5-40 SYRINGE (ML) INJECTION EVERY 12 HOURS SCHEDULED
Status: DISCONTINUED | OUTPATIENT
Start: 2025-03-20 | End: 2025-03-22 | Stop reason: HOSPADM

## 2025-03-20 RX ORDER — LIDOCAINE HYDROCHLORIDE 10 MG/ML
INJECTION, SOLUTION INFILTRATION; PERINEURAL
Status: DISCONTINUED
Start: 2025-03-20 | End: 2025-03-20

## 2025-03-20 RX ORDER — SODIUM CHLORIDE, SODIUM LACTATE, POTASSIUM CHLORIDE, CALCIUM CHLORIDE 600; 310; 30; 20 MG/100ML; MG/100ML; MG/100ML; MG/100ML
INJECTION, SOLUTION INTRAVENOUS CONTINUOUS
Status: DISCONTINUED | OUTPATIENT
Start: 2025-03-20 | End: 2025-03-22 | Stop reason: HOSPADM

## 2025-03-20 RX ORDER — ONDANSETRON 2 MG/ML
4 INJECTION INTRAMUSCULAR; INTRAVENOUS EVERY 6 HOURS PRN
OUTPATIENT
Start: 2025-03-20

## 2025-03-20 RX ORDER — FERROUS SULFATE 325(65) MG
325 TABLET ORAL 2 TIMES DAILY WITH MEALS
Status: DISCONTINUED | OUTPATIENT
Start: 2025-03-21 | End: 2025-03-22 | Stop reason: HOSPADM

## 2025-03-20 RX ORDER — MODIFIED LANOLIN
OINTMENT (GRAM) TOPICAL PRN
Status: DISCONTINUED | OUTPATIENT
Start: 2025-03-20 | End: 2025-03-22 | Stop reason: HOSPADM

## 2025-03-20 RX ORDER — IBUPROFEN 800 MG/1
800 TABLET, FILM COATED ORAL EVERY 8 HOURS SCHEDULED
Status: DISCONTINUED | OUTPATIENT
Start: 2025-03-20 | End: 2025-03-22 | Stop reason: HOSPADM

## 2025-03-20 RX ORDER — METHYLERGONOVINE MALEATE 0.2 MG/ML
200 INJECTION INTRAVENOUS PRN
Status: DISCONTINUED | OUTPATIENT
Start: 2025-03-20 | End: 2025-03-22 | Stop reason: HOSPADM

## 2025-03-20 RX ADMIN — ACETAMINOPHEN 1000 MG: 500 TABLET ORAL at 13:39

## 2025-03-20 RX ADMIN — FERROUS SULFATE TAB 325 MG (65 MG ELEMENTAL FE) 325 MG: 325 (65 FE) TAB at 18:10

## 2025-03-20 RX ADMIN — Medication 166.7 ML: at 12:30

## 2025-03-20 RX ADMIN — DOCUSATE SODIUM 100 MG: 100 CAPSULE, LIQUID FILLED ORAL at 21:14

## 2025-03-20 RX ADMIN — SODIUM CHLORIDE, PRESERVATIVE FREE 10 ML: 5 INJECTION INTRAVENOUS at 20:42

## 2025-03-20 RX ADMIN — Medication 87.3 MILLI-UNITS/MIN: at 12:41

## 2025-03-20 RX ADMIN — IBUPROFEN 800 MG: 800 TABLET, FILM COATED ORAL at 17:10

## 2025-03-20 RX ADMIN — ACETAMINOPHEN 1000 MG: 500 TABLET ORAL at 21:14

## 2025-03-20 ASSESSMENT — PAIN SCALES - GENERAL
PAINLEVEL_OUTOF10: 8
PAINLEVEL_OUTOF10: 0
PAINLEVEL_OUTOF10: 0
PAINLEVEL_OUTOF10: 9

## 2025-03-20 ASSESSMENT — PAIN DESCRIPTION - DESCRIPTORS: DESCRIPTORS: CRAMPING;DISCOMFORT

## 2025-03-20 ASSESSMENT — PAIN DESCRIPTION - LOCATION
LOCATION: ABDOMEN
LOCATION: ABDOMEN;PERINEUM

## 2025-03-20 ASSESSMENT — PAIN DESCRIPTION - ORIENTATION
ORIENTATION: LOWER
ORIENTATION: MID

## 2025-03-20 NOTE — CARE COORDINATION
SW Discharge Planning       SW noted patient to be currently known to [facility] ( [phone]) . Per nursing note, patient reported that a case manger from [facility] ( [phone]) was going to be present at delivery. SW did call .eric and spoke with , Bebe Negro, who stated that caseworkers do not typically present during labor. Bebe did report that patient has a case open with , Gisella Godfrey, and that he would let her know. Per Bebe, it is possible that Gisella was offering patient support.    Per chart review, SW did not note any consider for current  substance abuse for mother, however did note that she has a past history of psych admissions. The Following is per chart review:     On 7/22/23, Patient came to the ED reporting that her children had put beads into her ears. Please note the following from a note on that day,:     \"Patient became irrate in room when doctor looked in ear and also didn't see any beads within the ear canal. Started going through trash trying to shove things into her ears \"to get it out myself\", insistent that staff do a scope behind tympanic membrane to see bead.     Children where running around in room, one child was sucking on a iodine packet, the other was sucking on a packet of lube. The third child was in car seat crying and scared of situation. Police in room to help physically escort patient off property do to being discharge.   In parking lot she did not watch children at all. Two children ran out into traffic in the parking lot, then when putting children in car she put child in car seat in car, other helped himself in the car, and she got in and turned car on. Third child was hanging onto the outside of the back of the car. Patient was getting ready to put car in reverse when police and nurse yelled for her to stop. Child then crawled into car. ALL THREE children where not put into any type of car

## 2025-03-20 NOTE — CARE COORDINATION
SW Discharge Planning     [facility] ( [phone]) supervisor, Adri Awad return SW call. Per Adri, baby can remain in the room with Jayden Pena as long as Jayden does not have hallucinations . Adri reported that [facility] ( [phone]) is open with mother due to mother's mental health and for mother locking her children out multiple times. Adri reported that mother has remained non compliant with children services, and that she will be meeting with children service's  at noon tomorrow to make sure they have enough evidence to take baby into their custody. Adri reported that mother is already aware of this plan. Adri reported that she would call SW tomorrow as soon as she is finished with the  to let SW know if CSB will take custody.     PLAN    Baby can NOT be discharged home until [facility] ( [phone]) provides disposition  SW to continue communication with nursing staff and [facility] ( [phone])      Electronically signed by MERCY Gaffney on 3/20/2025 at 2:11 PM

## 2025-03-20 NOTE — PROGRESS NOTES
Called SW to discuss if  can stay in room with mother or if needs to be in nursery. SW awaiting call from CPS to further discuss options. States she will update me when she hears back.    Admission Reconciliation is Completed  Discharge Reconciliation is Not Complete Admission Reconciliation is Completed  Discharge Reconciliation is Completed

## 2025-03-20 NOTE — PROGRESS NOTES
Patient seen  See ethan morales note  Cx 5cm/90/0 vtx  Irregular ctx's  Fht's 140 with accels. Fht's reactive  Gbs neg  admit

## 2025-03-20 NOTE — PROGRESS NOTES
Patient reports she has  for CPS that plan to attend her delivery. Patient is listed on CPS alert sheet.

## 2025-03-20 NOTE — H&P
Department of Obstetrics and Gynecology  Physician Assistant Obstetrics History and Physical      HISTORY OF PRESENT ILLNESS:      The patient is a 29 y.o.  5 parity 3104 at 40 weeks' 1 days' gestation presents to L&D Antepartum from home due to abdominal and pelvic pain since 06:30. Patient of Dr. Bay, states she saw him last week. Last SVE per patient was 3 weeks ago and reports she was 2 cms dilated.    Current obstetric history is significant for:  3 previous term 's  1  delivery at 35w6d       Estimated Due Date:  2025  Contractions: Yes  Leaking of fluid: No  Bleeding:  No  Perceived fetal movement: Good        PAST OB HISTORY:  OB History    Para Term  AB Living   5 4 3 1  4   SAB IAB Ectopic Molar Multiple Live Births       0 4      # Outcome Date GA Lbr Momo/2nd Weight Sex Type Anes PTL Lv   5 Current            4 Term 22 37w6d 03:30 / 00:07 3.32 kg (7 lb 5.1 oz) M Vag-Spont None N MARIA C   3  02/10/20 35w6d  2.5 kg (5 lb 8.2 oz) F Vag-Spont None Y MARIA C      Complications:  Labor   2 Term 18 38w4d  3.515 kg (7 lb 12 oz) M Vag-Spont None N MARIA C   1 Term 16 38w3d  3.3 kg (7 lb 4.4 oz) M Vag-Spont EPI  MARIA C           Pre-eclampsia:  No      :  No      D & C:  No      Cerclage:  No      LEEP:  No      Myomectomy:  No       Labor: Yes    Past Medical History:    Diagnosis Date    Allergic     seasonal    Anemia     Anxiety     Bipolar 1 disorder (HCC) 2022    Emotional disorder     Rh sensitized     Traumatic injury during pregnancy, third trimester         Past Surgical History:    History reviewed. No pertinent surgical history.     Social History:    Reports that she quit smoking about 10 years ago. Her smoking use included cigarettes. She has never used smokeless tobacco. She reports that she does not drink alcohol and does not use drugs.     Family History   Problem Relation Age of Onset    Hypertension Mother      sob  Cardio: No chest pain, no exertional dyspnea, no PND, no orthopnea, no palpitation, no leg swelling.   GI: No dysphagia, no reflux; no abdominal pain, no n/v; no c/d. No hematochezia    : No dysuria, no frequency, hesitancy; no hematuria  MSK: no joint pain, no myalgia, no change in ROM  Neuro: no focal weakness in extremities, no slurred speech, no double vision, no numbness or tingling in extremities  Endo: no heat/cold intolerance, no polyphagia, polydipsia or polyuria  Hem: no increased bleeding, no bruising, no lymphadenopathy  Skin: no skin changes  Psych: no depressed mood, no suicidal ideation  Pertinent +'s & -'s addressed in HPI    PHYSICAL EXAM:  /71   Pulse (!) 111   Temp 98.7 °F (37.1 °C) (Oral)   LMP 2024     General appearance: Comfortable  Lungs:  CTA bilaterally, good excursion  Heart:  Regular Rate & Rhythm, no murmur noted  Abdomen:  Soft, non-tender, gravid  Uterus: soft, nontender  Fetal heart rate:  Baseline Heart Rate 130; variability: moderate;  accelerations: present;  decelerations: absent  Cervix:  DILATION: 5 - 6 cms  EFFACEMENT:   80  STATION:  -1 cm  Contraction frequency:  Q 3 - 8 minutes  Membranes:  Intact  Back: (-) CVA tenderness.  Extremities: (-) edema      ASSESSMENT:   30 yo  IUP at 40 weeks' 1 days' gestation    R/o labor         Plan: Orders per Dr. Mann:  EFM  Admit, transfer to L&D, anticipate normal delivery, routine labor orders  Other: may have Epidural      Electronically signed by Paulette Carreno PA-C on 3/20/2025 at 9:16 AM

## 2025-03-20 NOTE — PROGRESS NOTES
Patient presents to antepartum with complaints of abdominal pain that starts at 0630. Lower pelvic pain. Denies LOF and VB. +FM

## 2025-03-20 NOTE — CARE COORDINATION
SW Discharge Planning     SW notified [facility] ( [phone]) , Shade Negro of baby's birth. SW was transferred to , Gisella Godfrey and left a voice message. SW left voice message for supervisor, halle Awad     PLAN    Baby can NOT be discharged home until [facility] ( [phone]) provides disposition  SW to continue communication with nursing staff and [facility] ( [phone])    Electronically signed by MERCY Gaffney on 3/20/2025 at 12:58 PM

## 2025-03-20 NOTE — PROCEDURES
Vaginal Delivery    Infant Wt:  3400 grams    APGARS:     1 minute: 9  5 minute: 9    Time of Delivery: 1224  Delayed cord clamping.  Fetal Position: occiput anterior    Baby Suction with bulb on the maternal abdomen. Spontaneous respirations. Placenta delivered spontaneously intact at 1230.  Placenta sent to pathology: Yes     Estimated blood loss:  300 cc    Uterus firm, pitocin running. rectum intact.  Episiotomy: No    Laceration: Yes  small First degree laceration.  Suture used for repair:  Vicryl 3.0   Cord blood and gases done.  Sponge count correct.   No complications.PROCEDURE NOTE  Date: 3/20/2025   Name: Jayden Pena  YOB: 1995    Procedures

## 2025-03-20 NOTE — PROGRESS NOTES
Patient admitted into room and oriented to surroundings. Introduced self and wrote this RN's name and phone extension on patient's white board. Phone and nurse's call light at patient's bedside and instructed to use for any needs. Patient instructed on new admission informational packet at bedside . Questions answered. Pt is refusing FLU and TDAP vaccines.

## 2025-03-20 NOTE — PROGRESS NOTES
Pt transferred from antepartum to LD11. Assumed pt care, received report from Tyree PIMENTEL. Admission orders received. EFM applied, call light within reach and instructed on use.

## 2025-03-21 PROCEDURE — 6370000000 HC RX 637 (ALT 250 FOR IP): Performed by: OBSTETRICS & GYNECOLOGY

## 2025-03-21 PROCEDURE — 1220000000 HC SEMI PRIVATE OB R&B

## 2025-03-21 RX ADMIN — FERROUS SULFATE TAB 325 MG (65 MG ELEMENTAL FE) 325 MG: 325 (65 FE) TAB at 10:15

## 2025-03-21 RX ADMIN — DOCUSATE SODIUM 100 MG: 100 CAPSULE, LIQUID FILLED ORAL at 22:39

## 2025-03-21 RX ADMIN — IBUPROFEN 800 MG: 800 TABLET, FILM COATED ORAL at 10:15

## 2025-03-21 RX ADMIN — IBUPROFEN 800 MG: 800 TABLET, FILM COATED ORAL at 01:23

## 2025-03-21 RX ADMIN — ACETAMINOPHEN 1000 MG: 500 TABLET ORAL at 05:13

## 2025-03-21 RX ADMIN — Medication: at 10:14

## 2025-03-21 RX ADMIN — DOCUSATE SODIUM 100 MG: 100 CAPSULE, LIQUID FILLED ORAL at 10:15

## 2025-03-21 ASSESSMENT — PAIN SCALES - GENERAL: PAINLEVEL_OUTOF10: 1

## 2025-03-21 NOTE — CARE COORDINATION
Sw Discharge Planning     LIONEL spoke with [facility] ( [phone]) supervisor, Adri Awad, who reported that mother signed voluntary custody over to [facility] ( [phone]). Adri reported that baby can be discharged home to foster parents, Marilyn and Moise Bravo ( [phone]) who reside at [address] [place] [address].     LIONEL informed Adri that SW will need documentation reporting that [facility] ( [phone]) has custody of baby. LIONEL has not yet received the document, and has attempted to contact Adri several times. LIONEL attempted to talk with supervisor Duc Tobar, who reported that he texted her to contact this SW. Upon still not hearing back, LIONEL called [facility] ( [phone]) and spoke with , Bebe Negro, who reported that a lot of people have already left for the day and he is unsure if it can be sent. LIONEL then received e-mail with needed document. Document was placed in infant's chart.      PLAN    Baby to be discharged home to foster parents.   Nursing staff will need to have release of  document signed ( it was placed in infant's chart)  Nursing staff will need to take copy of 's ID and place with document     Electronically signed by MERCY Gaffney on 3/21/2025 at 3:57 PM

## 2025-03-21 NOTE — PROGRESS NOTES
[facility] Hearing screening results were discussed with parent. Questions answered. Brochure given to parent. Advised to monitor developmental milestones and contact physician for any concerns.   Mariah Pompa

## 2025-03-21 NOTE — PROGRESS NOTES
Department of Obstetrics and Gynecology  Labor and Delivery  Attending Post Partum Progress Note      Subjective:   Pt seen & examined, no overnight complaints.  Pain well controlled.  Lochia decreasing.  Denies any fevers, chills, weakness, dizziness, headache, chest pain, vision changes, SOB, nausea, or vomiting. Breastfeeding Voided spontaneously Ambulating    Review of Systems  Skin: no changes  All other review of systems negative    Physical Exam:  Vitals:    03/20/25 1936 03/20/25 2342 03/21/25 0329 03/21/25 0858   BP: 109/66 108/67 114/71 108/80   Pulse: 95 97 73 86   Resp: 16 16 16 16   Temp: 98.8 °F (37.1 °C) 98.4 °F (36.9 °C) 98.4 °F (36.9 °C) 98.1 °F (36.7 °C)   TempSrc: Oral Oral Oral Oral   SpO2: 99% 97% 100% 99%      EXAM: pleasant,well developed,well nourished emale in no apparent distress  HEENT: WNL  NECK: Full range of motion  CHEST: Normal inspiratory effort  ABDOMEN: fundus firm. Non tender  NEURO: alert and oriented x3      Labs:  Admission on 03/20/2025   Component Date Value Ref Range Status    WBC 03/20/2025 5.7  4.5 - 11.5 k/uL Final    RBC 03/20/2025 3.46 (L)  3.50 - 5.50 m/uL Final    Hemoglobin 03/20/2025 9.9 (L)  11.5 - 15.5 g/dL Final    Hematocrit 03/20/2025 31.7 (L)  34.0 - 48.0 % Final    MCV 03/20/2025 91.6  80.0 - 99.9 fL Final    MCH 03/20/2025 28.6  26.0 - 35.0 pg Final    MCHC 03/20/2025 31.2 (L)  32.0 - 34.5 g/dL Final    RDW 03/20/2025 14.9  11.5 - 15.0 % Final    Platelets 03/20/2025 165  130 - 450 k/uL Final    MPV 03/20/2025 12.0  7.0 - 12.0 fL Final    Blood Bank Sample Expiration 03/20/2025 03/23/2025,2359   Final    Arm Band Number 03/20/2025 [id number]   Final    ABO/Rh 03/20/2025 A POSITIVE   Final    Antibody Screen 03/20/2025 NEGATIVE   Final    Amphetamine Screen, Ur 03/20/2025 NEGATIVE  NEGATIVE Final    Cutoff: 1000 ng/mL    Barbiturate Screen, Ur 03/20/2025 NEGATIVE  NEGATIVE Final    Cutoff: 200 ng/ml    Cocaine Metabolite, Urine 03/20/2025 NEGATIVE  NEGATIVE

## 2025-03-22 VITALS
OXYGEN SATURATION: 100 % | SYSTOLIC BLOOD PRESSURE: 110 MMHG | DIASTOLIC BLOOD PRESSURE: 73 MMHG | TEMPERATURE: 98.6 F | RESPIRATION RATE: 16 BRPM | HEART RATE: 83 BPM

## 2025-03-22 PROCEDURE — APPNB15 APP NON BILLABLE TIME 0-15 MINS: Performed by: MIDWIFE

## 2025-03-22 PROCEDURE — 6370000000 HC RX 637 (ALT 250 FOR IP): Performed by: OBSTETRICS & GYNECOLOGY

## 2025-03-22 RX ORDER — IBUPROFEN 800 MG/1
800 TABLET, FILM COATED ORAL EVERY 8 HOURS PRN
Qty: 28 TABLET | Refills: 0 | Status: SHIPPED | OUTPATIENT
Start: 2025-03-22

## 2025-03-22 RX ORDER — PSEUDOEPHEDRINE HCL 30 MG
100 TABLET ORAL 2 TIMES DAILY PRN
Qty: 60 CAPSULE | Refills: 1 | Status: SHIPPED | OUTPATIENT
Start: 2025-03-22

## 2025-03-22 RX ORDER — FLUTICASONE PROPIONATE 50 MCG
1 SPRAY, SUSPENSION (ML) NASAL DAILY PRN
Qty: 16 G | Refills: 3 | Status: SHIPPED | OUTPATIENT
Start: 2025-03-22

## 2025-03-22 RX ADMIN — IBUPROFEN 800 MG: 800 TABLET, FILM COATED ORAL at 00:54

## 2025-03-22 RX ADMIN — IBUPROFEN 800 MG: 800 TABLET, FILM COATED ORAL at 10:03

## 2025-03-22 RX ADMIN — DOCUSATE SODIUM 100 MG: 100 CAPSULE, LIQUID FILLED ORAL at 09:25

## 2025-03-22 RX ADMIN — FERROUS SULFATE TAB 325 MG (65 MG ELEMENTAL FE) 325 MG: 325 (65 FE) TAB at 09:25

## 2025-03-22 ASSESSMENT — PAIN DESCRIPTION - LOCATION
LOCATION: ABDOMEN
LOCATION: ABDOMEN

## 2025-03-22 ASSESSMENT — PAIN DESCRIPTION - ORIENTATION
ORIENTATION: LOWER
ORIENTATION: LOWER

## 2025-03-22 ASSESSMENT — PAIN - FUNCTIONAL ASSESSMENT
PAIN_FUNCTIONAL_ASSESSMENT: ACTIVITIES ARE NOT PREVENTED
PAIN_FUNCTIONAL_ASSESSMENT: ACTIVITIES ARE NOT PREVENTED

## 2025-03-22 ASSESSMENT — PAIN SCALES - GENERAL
PAINLEVEL_OUTOF10: 0
PAINLEVEL_OUTOF10: 9
PAINLEVEL_OUTOF10: 9

## 2025-03-22 ASSESSMENT — PAIN DESCRIPTION - DESCRIPTORS
DESCRIPTORS: ACHING;CRAMPING;DISCOMFORT
DESCRIPTORS: CRAMPING

## 2025-03-22 NOTE — PLAN OF CARE
Care plans completed.   Pt to call for RN when ride here to pick her up for discharge and RN or PCA will take her down to car in wheelchair.

## 2025-03-22 NOTE — DISCHARGE INSTRUCTIONS
Follow-up with your OB doctor in 1 week if  delivery or in 6 weeks for vaginal delivery unless otherwise instructed.   Call office for an appointment.    For breastfeeding support, you can contact our lactation specialists at [phone] or [phone]    DIET  Eat a well balanced diet focusing on foods high in fiber and protein  Drink plenty of fluids especially water.  To avoid constipation you may take a mild stool softener as recommended by your doctor or midwife.    ACTIVITY  Gradually increase your activity.  Resume exercise regimen only after advised by your doctor or midwife.  Avoid lifting anything heavier than your baby or a gallon of milk for SIX weeks.   Avoid driving until your doctor or midwife has given their approval.  Rise slowly from a lying to sitting and then a standing position.  Climb stairs one at a time.  Use caution when carrying your baby up and down the stairs.  No sexual activity for 6 weeks or until advised by your doctor - Nothing in vagina: intercourse, tampons, or douching.   Be prepared to discuss family planning at your follow-up OB visit.   You may feel tired or have a lack of energy.  You may continue your prenatal vitamin to replenish nutrients post delivery.  Nap when baby naps to catch up on sleep.  May return to work or school in 6 weeks or as directed by OB.     EMOTIONS  You may feed benitez, sad, teary, & overwhelmed.  Contact your OB provider if you feel you may be showing signs of postpartum depression, or have thoughts of harming yourself or your infant.  If infant will not stop crying, contact another adult for help or place infant in their crib on their back and take a break.  NEVER shake your infant.      BLEEDING  Vaginal bleeding will decrease in amount over the next few weeks.  You will notice that as your activity increases, your flow may increase.  This is your body's way of telling you, you need to take things easier and rest more often.  Call your  OB/ER if you are saturating more than one maxi pad in an hour.    BREAST CARE  Take medications as recommended by your doctor or midwife for pain  If you develop a warm, red, tender area on your breast or develop a fever contact your OB provider.    For breastfeeding moms:  If you become engorged, feeding may be more difficult or painful for 1-2 days.  You may find it helpful to hand express some milk so that the infant can latch on more easily.   While breastfeeding, continue to take your prenatal vitamins as directed by your doctor or midwife.   Only take medications verified as safe for breastfeeding.    For non-breastfeeding moms:  You may apply ice packs to your breasts over your bra for twenty minutes at a time for comfort.  Avoid stimulation to your breasts, when showering allow the water to strike your back not your breasts.    Wear a good fitting bra until your milk dries, such as a sports bra.    INCISIONAL CARE / LIANA CARE  Clean your incision in the shower with mild soap.  After shower pat the incision area dry and leave open to air.  If used, Steri-stipes should be removed by 2 weeks.  If used, Staples should be removed by the OB in office by 1 week.  If used/ordered, an abdominal binder may provide support for your incision.   Use the liana-bottle after toileting until bleeding stops.  Cleanse your perineum from front to back  If used, stitches or internal clips will dissolve in 4-6 weeks.  You may use a sitz bath or soak in a clean tub as needed for comfort.  Kegel exercises will help restore bladder control.     SWELLING  Keep your legs elevated when sitting or lying.   When wearing stocking or socks, make sure they are not too tight.    WHEN TO CALL THE DOCTOR  If you have a temp of 100.4 or more.   If your bleeding has increased and you are saturating a pad in an hour.  Your abdomen is tender to touch.  You are passing blood clots bigger than the size of a lemon.  If you are experiencing extreme

## 2025-03-22 NOTE — PROGRESS NOTES
Pt states that she has a lyft coming to pick her up.   PCA took pt to main entrance.     Pt told PCA that she did not have a ride.   RN contacted clinical supervisor and taxi voucher taken and to pt.   RN called taxi for pt for ride from hospital main entrance to the [facility] in [place].

## 2025-03-22 NOTE — PROGRESS NOTES
CNM asked nursing that pt would like to see infant prior to discharge.   Infant back in room at this time.     Patient given a copy of her discharge instructions for her records.     Patient instructed on discharge instructions with verbalized understanding. Questions answered prn.

## 2025-03-22 NOTE — PLAN OF CARE
Problem: Discharge Planning  Goal: Discharge to home or other facility with appropriate resources  Outcome: Progressing     Problem: Postpartum  Goal: Experiences normal postpartum course  Description:  Postpartum OB-Pregnancy care plan goal which identifies if the mother is experiencing a normal postpartum course  Outcome: Progressing     Problem: Postpartum  Goal: Incisions, wounds, or drain sites healing without S/S of infection  Outcome: Progressing     Problem: Pain  Goal: Verbalizes/displays adequate comfort level or baseline comfort level  Outcome: Progressing  Flowsheets (Taken 3/21/2025 1993)  Verbalizes/displays adequate comfort level or baseline comfort level:   Encourage patient to monitor pain and request assistance   Assess pain using appropriate pain scale   Administer analgesics based on type and severity of pain and evaluate response   Implement non-pharmacological measures as appropriate and evaluate response   Consider cultural and social influences on pain and pain management   Notify Licensed Independent Practitioner if interventions unsuccessful or patient reports new pain     Problem: Infection - Adult  Goal: Absence of infection at discharge  Outcome: Progressing     Problem: Infection - Adult  Goal: Absence of infection during hospitalization  Outcome: Progressing     Problem: Infection - Adult  Goal: Absence of fever/infection during anticipated neutropenic period  Outcome: Progressing     Problem: Safety - Adult  Goal: Free from fall injury  Outcome: Progressing     Problem: Chronic Conditions and Co-morbidities  Goal: Patient's chronic conditions and co-morbidity symptoms are monitored and maintained or improved  Outcome: Progressing

## 2025-03-22 NOTE — PROGRESS NOTES
S:  Feels well, ready to go home  Wants to see the baby before she goes  Baby is going to foster care  Tolerating PO  Voiding without difficulty, had BM  Bleeding \"lighter than it was\"    O:  VSS, afebrile  Fundus firm @U  Bleeding mod rubra  No edema    A:  Normal PPD#2    P:  Discharge to home

## 2025-04-05 LAB — SURGICAL PATHOLOGY REPORT: NORMAL

## 2025-07-14 ENCOUNTER — TELEPHONE (OUTPATIENT)
Age: 30
End: 2025-07-14